# Patient Record
Sex: FEMALE | Race: WHITE | NOT HISPANIC OR LATINO
[De-identification: names, ages, dates, MRNs, and addresses within clinical notes are randomized per-mention and may not be internally consistent; named-entity substitution may affect disease eponyms.]

---

## 2017-03-07 ENCOUNTER — APPOINTMENT (OUTPATIENT)
Dept: INTERNAL MEDICINE | Facility: CLINIC | Age: 82
End: 2017-03-07

## 2017-03-07 VITALS
DIASTOLIC BLOOD PRESSURE: 78 MMHG | HEART RATE: 70 BPM | WEIGHT: 152 LBS | HEIGHT: 64 IN | BODY MASS INDEX: 25.95 KG/M2 | RESPIRATION RATE: 14 BRPM | SYSTOLIC BLOOD PRESSURE: 128 MMHG

## 2017-03-31 LAB
25(OH)D3 SERPL-MCNC: 6.5 NG/ML
ALBUMIN SERPL ELPH-MCNC: 4.1 G/DL
ALP BLD-CCNC: 56 U/L
ALT SERPL-CCNC: 13 U/L
ANION GAP SERPL CALC-SCNC: 13 MMOL/L
AST SERPL-CCNC: 19 U/L
BASOPHILS # BLD AUTO: 0.02 K/UL
BASOPHILS NFR BLD AUTO: 0.5 %
BILIRUB SERPL-MCNC: 0.5 MG/DL
BUN SERPL-MCNC: 16 MG/DL
CALCIUM SERPL-MCNC: 9.2 MG/DL
CHLORIDE SERPL-SCNC: 102 MMOL/L
CHOLEST SERPL-MCNC: 178 MG/DL
CHOLEST/HDLC SERPL: 2.4 RATIO
CO2 SERPL-SCNC: 25 MMOL/L
CREAT SERPL-MCNC: 0.92 MG/DL
CRP SERPL HS-MCNC: 0.4 MG/L
EOSINOPHIL # BLD AUTO: 0.09 K/UL
EOSINOPHIL NFR BLD AUTO: 2.2 %
GLUCOSE BS SERPL-MCNC: 100 MG/DL
GLUCOSE SERPL-MCNC: 104 MG/DL
HBA1C MFR BLD HPLC: 6.1 %
HCT VFR BLD CALC: 47.4 %
HDLC SERPL-MCNC: 75 MG/DL
HGB BLD-MCNC: 15.2 G/DL
IMM GRANULOCYTES NFR BLD AUTO: 0 %
LDLC SERPL CALC-MCNC: 87 MG/DL
LYMPHOCYTES # BLD AUTO: 1.37 K/UL
LYMPHOCYTES NFR BLD AUTO: 32.9 %
MAN DIFF?: NORMAL
MCHC RBC-ENTMCNC: 29.5 PG
MCHC RBC-ENTMCNC: 32.1 GM/DL
MCV RBC AUTO: 91.9 FL
MONOCYTES # BLD AUTO: 0.49 K/UL
MONOCYTES NFR BLD AUTO: 11.8 %
NEUTROPHILS # BLD AUTO: 2.19 K/UL
NEUTROPHILS NFR BLD AUTO: 52.6 %
PLATELET # BLD AUTO: 204 K/UL
POTASSIUM SERPL-SCNC: 4.4 MMOL/L
PROT SERPL-MCNC: 7.5 G/DL
RBC # BLD: 5.16 M/UL
RBC # FLD: 15 %
SODIUM SERPL-SCNC: 140 MMOL/L
T4 FREE SERPL-MCNC: 1.1 NG/DL
TRIGL SERPL-MCNC: 81 MG/DL
TSH SERPL-ACNC: 2.54 UIU/ML
VIT B12 SERPL-MCNC: 419 PG/ML
WBC # FLD AUTO: 4.16 K/UL

## 2017-06-27 ENCOUNTER — APPOINTMENT (OUTPATIENT)
Dept: INTERNAL MEDICINE | Facility: CLINIC | Age: 82
End: 2017-06-27

## 2017-06-27 VITALS
HEIGHT: 64 IN | DIASTOLIC BLOOD PRESSURE: 80 MMHG | WEIGHT: 153 LBS | HEART RATE: 74 BPM | BODY MASS INDEX: 26.12 KG/M2 | SYSTOLIC BLOOD PRESSURE: 130 MMHG | RESPIRATION RATE: 14 BRPM

## 2017-06-27 DIAGNOSIS — Z78.9 OTHER SPECIFIED HEALTH STATUS: ICD-10-CM

## 2017-06-27 RX ORDER — FLUTICASONE PROPIONATE 50 UG/1
50 SPRAY, METERED NASAL DAILY
Qty: 1 | Refills: 3 | Status: DISCONTINUED | COMMUNITY
Start: 2017-03-07 | End: 2017-06-27

## 2017-06-27 RX ORDER — AZITHROMYCIN 250 MG/1
250 TABLET, FILM COATED ORAL
Qty: 6 | Refills: 1 | Status: DISCONTINUED | COMMUNITY
Start: 2017-03-07 | End: 2017-06-27

## 2017-07-27 ENCOUNTER — APPOINTMENT (OUTPATIENT)
Dept: INTERNAL MEDICINE | Facility: CLINIC | Age: 82
End: 2017-07-27
Payer: MEDICARE

## 2017-07-27 VITALS
SYSTOLIC BLOOD PRESSURE: 134 MMHG | HEIGHT: 64 IN | WEIGHT: 154 LBS | RESPIRATION RATE: 14 BRPM | DIASTOLIC BLOOD PRESSURE: 78 MMHG | BODY MASS INDEX: 26.29 KG/M2 | HEART RATE: 68 BPM | TEMPERATURE: 99.5 F

## 2017-07-27 DIAGNOSIS — Z87.09 PERSONAL HISTORY OF OTHER DISEASES OF THE RESPIRATORY SYSTEM: ICD-10-CM

## 2017-07-27 PROCEDURE — 99215 OFFICE O/P EST HI 40 MIN: CPT

## 2017-10-12 ENCOUNTER — APPOINTMENT (OUTPATIENT)
Dept: INTERNAL MEDICINE | Facility: CLINIC | Age: 82
End: 2017-10-12
Payer: MEDICARE

## 2017-10-12 VITALS
DIASTOLIC BLOOD PRESSURE: 80 MMHG | HEART RATE: 72 BPM | WEIGHT: 152 LBS | BODY MASS INDEX: 25.95 KG/M2 | HEIGHT: 64 IN | RESPIRATION RATE: 15 BRPM | TEMPERATURE: 98 F | SYSTOLIC BLOOD PRESSURE: 132 MMHG

## 2017-10-12 PROCEDURE — 99215 OFFICE O/P EST HI 40 MIN: CPT

## 2017-10-12 RX ORDER — FLUTICASONE PROPIONATE 50 UG/1
50 SPRAY, METERED NASAL DAILY
Qty: 1 | Refills: 3 | Status: DISCONTINUED | COMMUNITY
Start: 2017-07-27 | End: 2017-10-12

## 2017-10-12 RX ORDER — AZITHROMYCIN 250 MG/1
250 TABLET, FILM COATED ORAL
Qty: 6 | Refills: 1 | Status: DISCONTINUED | COMMUNITY
Start: 2017-07-27 | End: 2017-10-12

## 2018-01-11 ENCOUNTER — APPOINTMENT (OUTPATIENT)
Dept: INTERNAL MEDICINE | Facility: CLINIC | Age: 83
End: 2018-01-11
Payer: MEDICARE

## 2018-01-11 VITALS
RESPIRATION RATE: 14 BRPM | HEIGHT: 64 IN | WEIGHT: 150 LBS | HEART RATE: 76 BPM | DIASTOLIC BLOOD PRESSURE: 80 MMHG | SYSTOLIC BLOOD PRESSURE: 128 MMHG | BODY MASS INDEX: 25.61 KG/M2

## 2018-01-11 PROCEDURE — 99215 OFFICE O/P EST HI 40 MIN: CPT

## 2018-05-10 ENCOUNTER — APPOINTMENT (OUTPATIENT)
Dept: INTERNAL MEDICINE | Facility: CLINIC | Age: 83
End: 2018-05-10
Payer: MEDICARE

## 2018-05-10 ENCOUNTER — OTHER (OUTPATIENT)
Age: 83
End: 2018-05-10

## 2018-05-10 VITALS
DIASTOLIC BLOOD PRESSURE: 82 MMHG | BODY MASS INDEX: 25.44 KG/M2 | RESPIRATION RATE: 15 BRPM | HEIGHT: 64 IN | WEIGHT: 149 LBS | SYSTOLIC BLOOD PRESSURE: 126 MMHG | HEART RATE: 76 BPM

## 2018-05-10 PROCEDURE — 99215 OFFICE O/P EST HI 40 MIN: CPT

## 2018-05-11 ENCOUNTER — LABORATORY RESULT (OUTPATIENT)
Age: 83
End: 2018-05-11

## 2018-05-21 LAB
25(OH)D3 SERPL-MCNC: 10.3 NG/ML
ALBUMIN SERPL ELPH-MCNC: 4.2 G/DL
ALP BLD-CCNC: 43 U/L
ALT SERPL-CCNC: 18 U/L
ANION GAP SERPL CALC-SCNC: 14 MMOL/L
APPEARANCE: CLEAR
AST SERPL-CCNC: 21 U/L
BASOPHILS # BLD AUTO: 0.02 K/UL
BASOPHILS NFR BLD AUTO: 0.4 %
BILIRUB SERPL-MCNC: 0.6 MG/DL
BILIRUBIN URINE: NEGATIVE
BLOOD URINE: NEGATIVE
BUN SERPL-MCNC: 19 MG/DL
CALCIUM SERPL-MCNC: 9.3 MG/DL
CHLORIDE SERPL-SCNC: 102 MMOL/L
CHOLEST SERPL-MCNC: 194 MG/DL
CHOLEST/HDLC SERPL: 2.4 RATIO
CO2 SERPL-SCNC: 27 MMOL/L
COLOR: YELLOW
CREAT SERPL-MCNC: 0.95 MG/DL
CRP SERPL HS-MCNC: <0.2 MG/L
EOSINOPHIL # BLD AUTO: 0.08 K/UL
EOSINOPHIL NFR BLD AUTO: 1.7 %
GLUCOSE BS SERPL-MCNC: 92 MG/DL
GLUCOSE QUALITATIVE U: NEGATIVE MG/DL
GLUCOSE SERPL-MCNC: 99 MG/DL
HBA1C MFR BLD HPLC: 6.2 %
HCT VFR BLD CALC: 45.7 %
HDLC SERPL-MCNC: 82 MG/DL
HGB BLD-MCNC: 14.7 G/DL
IMM GRANULOCYTES NFR BLD AUTO: 0.2 %
KETONES URINE: NEGATIVE
LDLC SERPL CALC-MCNC: 97 MG/DL
LEUKOCYTE ESTERASE URINE: NEGATIVE
LYMPHOCYTES # BLD AUTO: 1.67 K/UL
LYMPHOCYTES NFR BLD AUTO: 35 %
MAN DIFF?: NORMAL
MCHC RBC-ENTMCNC: 30.4 PG
MCHC RBC-ENTMCNC: 32.2 GM/DL
MCV RBC AUTO: 94.4 FL
MONOCYTES # BLD AUTO: 0.64 K/UL
MONOCYTES NFR BLD AUTO: 13.4 %
NEUTROPHILS # BLD AUTO: 2.35 K/UL
NEUTROPHILS NFR BLD AUTO: 49.3 %
NITRITE URINE: NEGATIVE
PH URINE: 5.5
PLATELET # BLD AUTO: 185 K/UL
POTASSIUM SERPL-SCNC: 3.5 MMOL/L
PROT SERPL-MCNC: 7.6 G/DL
PROTEIN URINE: ABNORMAL MG/DL
RBC # BLD: 4.84 M/UL
RBC # FLD: 14.8 %
SODIUM SERPL-SCNC: 143 MMOL/L
SPECIFIC GRAVITY URINE: 1.02
T4 FREE SERPL-MCNC: 1.3 NG/DL
TRIGL SERPL-MCNC: 73 MG/DL
TSH SERPL-ACNC: 2.92 UIU/ML
UROBILINOGEN URINE: NEGATIVE MG/DL
VIT B12 SERPL-MCNC: 434 PG/ML
WBC # FLD AUTO: 4.77 K/UL

## 2018-11-09 ENCOUNTER — APPOINTMENT (OUTPATIENT)
Dept: INTERNAL MEDICINE | Facility: CLINIC | Age: 83
End: 2018-11-09
Payer: MEDICARE

## 2018-11-09 VITALS
SYSTOLIC BLOOD PRESSURE: 128 MMHG | RESPIRATION RATE: 16 BRPM | DIASTOLIC BLOOD PRESSURE: 80 MMHG | HEART RATE: 78 BPM | HEIGHT: 64 IN | BODY MASS INDEX: 27.12 KG/M2

## 2018-11-09 VITALS — BODY MASS INDEX: 26.98 KG/M2 | WEIGHT: 158 LBS | HEIGHT: 64 IN

## 2018-11-09 PROCEDURE — 99214 OFFICE O/P EST MOD 30 MIN: CPT

## 2018-11-09 NOTE — ASSESSMENT
[FreeTextEntry1] : Physical exam shows a well-developed female in no acute distress blood pressure 120/80 weight 158 pounds heart rate is 78 respirations of 16.. HEENT was unremarkable chest was clear to auscultation and percussion cardiovascular exam shows a normal S1 and S2 with no extra heart sounds or murmurs abdomen was soft and nontender extremities showed no clubbing cyanosis or edema neurologic exam was nonfocal patient refuses all immunizations of any type some time was spent advising her to significance of getting them and she is adamant against. She was given the name of an audiologist to get a hearing test as her hearing may be slightly diminished blood tests were performed in May of this year to date with her ophthalmologist and mentions that she has the start of cataracts

## 2018-11-09 NOTE — HISTORY OF PRESENT ILLNESS
[FreeTextEntry1] : Comes to the office for followup to review her medications and discuss her overall health main issues are over the past several years her energy has been decreasing [de-identified] : 84-year-old woman comes to the office for followup to review her medication and discuss her overall health patient's main complaint is faigued at times he denies chest pain shortness of breath exertional dyspnea lightheadedness palpitations dizziness or syncope he's had no temperature chills sweats myalgias headache sinus congestion sore throat cough wheezing abdominal pain dysuria nausea vomiting diarrhea constipation bright red blood per rectum or black stools he denies significant musculoskeletal pains he has mild lower back pain gets up several times at night to urinate and is not worsened over the years her appetite has been good size and her weight is up several pounds

## 2018-11-09 NOTE — HEALTH RISK ASSESSMENT
[No falls in past year] : Patient reported no falls in the past year [0] : 2) Feeling down, depressed, or hopeless: Not at all (0) [[id number]] : 0

## 2018-11-09 NOTE — REVIEW OF SYSTEMS
[Nocturia] : nocturia [Joint Stiffness] : joint stiffness [Negative] : Heme/Lymph [Fever] : no fever [Chills] : no chills [Fatigue] : no fatigue [Hot Flashes] : no hot flashes [Night Sweats] : no night sweats [Recent Change In Weight] : ~T recent weight change [Discharge] : no discharge [Pain] : no pain [Redness] : no redness [Dryness] : no dryness  [Vision Problems] : no vision problems [Itching] : no itching [Earache] : no earache [Hearing Loss] : no hearing loss [Nosebleed] : no nosebleeds [Hoarseness] : no hoarseness [Nasal Discharge] : no nasal discharge [Sore Throat] : no sore throat [Postnasal Drip] : postnasal drip [Chest Pain] : no chest pain [Palpitations] : no palpitations [Leg Claudication] : no leg claudication [Lower Ext Edema] : no lower extremity edema [Orthopnea] : no orthopnea [Paroysmal Nocturnal Dyspnea] : no paroysmal nocturnal dyspnea [Shortness Of Breath] : no shortness of breath [Wheezing] : no wheezing [Cough] : no cough [Dyspnea on Exertion] : no dyspnea on exertion [Abdominal Pain] : no abdominal pain [Nausea] : no nausea [Constipation] : no constipation [Diarrhea] : diarrhea [Vomiting] : no vomiting [Heartburn] : no heartburn [Melena] : no melena [Dysuria] : no dysuria [Incontinence] : no incontinence [Poor Libido] : libido not poor [Hematuria] : no hematuria [Frequency] : no frequency [Vaginal Discharge] : no vaginal discharge [Dysmenorrhea] : no dysmenorrhea [Joint Pain] : no joint pain [Joint Swelling] : no joint swelling [Muscle Weakness] : no muscle weakness [Muscle Pain] : no muscle pain [Back Pain] : no back pain [Itching] : no itching [Mole Changes] : no mole changes [Nail Changes] : no nail changes [Hair Changes] : no hair changes [Skin Rash] : no skin rash [Headache] : no headache [Dizziness] : no dizziness [Fainting] : no fainting [Confusion] : no confusion [Memory Loss] : no memory loss [Unsteady Walking] : no ataxia [Suicidal] : not suicidal [Insomnia] : no insomnia [Anxiety] : no anxiety [Depression] : no depression [Easy Bleeding] : no easy bleeding [Easy Bruising] : no easy bruising [Swollen Glands] : no swollen glands [FreeTextEntry2] : up 2-3 lbs

## 2019-03-22 ENCOUNTER — APPOINTMENT (OUTPATIENT)
Dept: INTERNAL MEDICINE | Facility: CLINIC | Age: 84
End: 2019-03-22
Payer: MEDICARE

## 2019-03-22 VITALS
HEART RATE: 81 BPM | RESPIRATION RATE: 14 BRPM | OXYGEN SATURATION: 97 % | SYSTOLIC BLOOD PRESSURE: 138 MMHG | DIASTOLIC BLOOD PRESSURE: 81 MMHG | TEMPERATURE: 97.5 F

## 2019-03-22 VITALS — WEIGHT: 162 LBS | BODY MASS INDEX: 27.66 KG/M2 | HEIGHT: 64 IN

## 2019-03-22 DIAGNOSIS — R19.5 OTHER FECAL ABNORMALITIES: ICD-10-CM

## 2019-03-22 PROCEDURE — 99215 OFFICE O/P EST HI 40 MIN: CPT

## 2019-03-23 PROBLEM — R19.5 POSITIVE COLORECTAL CANCER SCREENING USING DNA-BASED STOOL TEST: Status: ACTIVE | Noted: 2017-10-12

## 2019-03-23 NOTE — REVIEW OF SYSTEMS
[Postnasal Drip] : postnasal drip [Nocturia] : nocturia [Joint Stiffness] : joint stiffness [Negative] : Heme/Lymph [Fever] : no fever [Chills] : no chills [Fatigue] : no fatigue [Hot Flashes] : no hot flashes [Night Sweats] : no night sweats [Recent Change In Weight] : ~T no recent weight change [Discharge] : no discharge [Pain] : no pain [Redness] : no redness [Dryness] : no dryness  [Vision Problems] : no vision problems [Itching] : no itching [Earache] : no earache [Hearing Loss] : no hearing loss [Nosebleed] : no nosebleeds [Hoarseness] : no hoarseness [Nasal Discharge] : no nasal discharge [Sore Throat] : no sore throat [Chest Pain] : no chest pain [Palpitations] : no palpitations [Leg Claudication] : no leg claudication [Lower Ext Edema] : no lower extremity edema [Orthopnea] : no orthopnea [Paroysmal Nocturnal Dyspnea] : no paroysmal nocturnal dyspnea [Shortness Of Breath] : no shortness of breath [Wheezing] : no wheezing [Cough] : no cough [Dyspnea on Exertion] : no dyspnea on exertion [Abdominal Pain] : no abdominal pain [Nausea] : no nausea [Constipation] : no constipation [Diarrhea] : diarrhea [Vomiting] : no vomiting [Heartburn] : no heartburn [Melena] : no melena [Dysuria] : no dysuria [Incontinence] : no incontinence [Poor Libido] : libido not poor [Hematuria] : no hematuria [Frequency] : no frequency [Vaginal Discharge] : no vaginal discharge [Dysmenorrhea] : no dysmenorrhea [Joint Pain] : no joint pain [Joint Swelling] : no joint swelling [Muscle Weakness] : no muscle weakness [Muscle Pain] : no muscle pain [Back Pain] : no back pain [Itching] : no itching [Mole Changes] : no mole changes [Nail Changes] : no nail changes [Hair Changes] : no hair changes [Skin Rash] : no skin rash [Headache] : no headache [Dizziness] : no dizziness [Fainting] : no fainting [Confusion] : no confusion [Memory Loss] : no memory loss [Unsteady Walking] : no ataxia [Suicidal] : not suicidal [Insomnia] : no insomnia [Anxiety] : anxiety [Depression] : no depression [Easy Bleeding] : no easy bleeding [Easy Bruising] : no easy bruising [Swollen Glands] : no swollen glands

## 2019-03-23 NOTE — HEALTH RISK ASSESSMENT
[No falls in past year] : Patient reported no falls in the past year [0] : 2) Feeling down, depressed, or hopeless: Not at all (0) [[id number]] : 0

## 2019-03-23 NOTE — HISTORY OF PRESENT ILLNESS
[FreeTextEntry1] : 84-year-old woman comes to the office for followup to review her medicines discuss her overall health she also needs a form filled out to continue to receive benefits from her '[employer] [de-identified] : 84-year-old woman with a history of hypertension lower back pain and a positive stool based DNA test comes to the office for followup patient denies temperature chills sweats or myalgias she's had no headache sinus congestion sore throat cough wheezing pleurisy  chest pain shortness of breath exertional dyspnea lightheadedness palpitations dizziness vertigo or syncope she denies abdominal pain nausea vomiting diarrhea change in caliber of her stools bright red blood per rectum or black stools her appetite has been good her weight has been stable her for occasional lower back pain she denies any significant musculoskeletal discomfort she does get up occasionally at night to urinate but denies dysuria

## 2019-04-03 LAB
25(OH)D3 SERPL-MCNC: 8.4 NG/ML
ALBUMIN SERPL ELPH-MCNC: 4.6 G/DL
ALP BLD-CCNC: 57 U/L
ALT SERPL-CCNC: 14 U/L
ANION GAP SERPL CALC-SCNC: 12 MMOL/L
AST SERPL-CCNC: 17 U/L
BASOPHILS # BLD AUTO: 0.02 K/UL
BASOPHILS NFR BLD AUTO: 0.4 %
BILIRUB SERPL-MCNC: 0.4 MG/DL
BUN SERPL-MCNC: 22 MG/DL
CALCIUM SERPL-MCNC: 9.5 MG/DL
CHLORIDE SERPL-SCNC: 103 MMOL/L
CHOLEST SERPL-MCNC: 207 MG/DL
CHOLEST/HDLC SERPL: 2.9 RATIO
CO2 SERPL-SCNC: 28 MMOL/L
CREAT SERPL-MCNC: 0.95 MG/DL
CRP SERPL HS-MCNC: 2.58 MG/L
EOSINOPHIL # BLD AUTO: 0.08 K/UL
EOSINOPHIL NFR BLD AUTO: 1.6 %
ESTIMATED AVERAGE GLUCOSE: 134 MG/DL
GLUCOSE BS SERPL-MCNC: 108 MG/DL
GLUCOSE SERPL-MCNC: 115 MG/DL
HBA1C MFR BLD HPLC: 6.3 %
HCT VFR BLD CALC: 49.6 %
HDLC SERPL-MCNC: 72 MG/DL
HGB BLD-MCNC: 16.1 G/DL
IMM GRANULOCYTES NFR BLD AUTO: 0.2 %
LDLC SERPL CALC-MCNC: 111 MG/DL
LYMPHOCYTES # BLD AUTO: 1.5 K/UL
LYMPHOCYTES NFR BLD AUTO: 30.6 %
MAN DIFF?: NORMAL
MCHC RBC-ENTMCNC: 30.1 PG
MCHC RBC-ENTMCNC: 32.5 GM/DL
MCV RBC AUTO: 92.7 FL
MONOCYTES # BLD AUTO: 0.59 K/UL
MONOCYTES NFR BLD AUTO: 12 %
NEUTROPHILS # BLD AUTO: 2.7 K/UL
NEUTROPHILS NFR BLD AUTO: 55.2 %
PLATELET # BLD AUTO: 208 K/UL
POTASSIUM SERPL-SCNC: 4.2 MMOL/L
PROT SERPL-MCNC: 7.5 G/DL
RBC # BLD: 5.35 M/UL
RBC # FLD: 14 %
SODIUM SERPL-SCNC: 143 MMOL/L
T4 FREE SERPL-MCNC: 1.1 NG/DL
TRIGL SERPL-MCNC: 119 MG/DL
TSH SERPL-ACNC: 2.29 UIU/ML
VIT B12 SERPL-MCNC: 403 PG/ML
WBC # FLD AUTO: 4.9 K/UL

## 2019-05-15 NOTE — ASSESSMENT
[FreeTextEntry1] : Physical exam shows an elderly woman in no acute distress blood pressure 138/81 height 5 foot 4 weight 162 pounds respirations 14 pulse of 81 HEENT was unremarkable chest was clear cardiovascular exam is regular with a 1/6 systolic murmur there were no carotid bruits abdomen was soft extremities showed no clubbing cyanosis or edema the neurologic exam was nonfocal long discussion with the patient concerning her positive stool based DNA test/COLOGUARD. She defers on any colon cancer screening such as colonoscopy  virtual colonoscopy or barium enema . Form was filled out as per her request for her 's [employer] benefits she wishes to perform yearly blood tests and a slip was given for CBC comprehensive metabolic profile lipid profile thyroid profile hemoglobin A1c urinalysis CRP and vitamins D3 and B12 she is up-to-date with her ophthalmologist and was given the name of dermatologist to consider Deep Sutures: 4-0 Monocryl

## 2019-06-21 ENCOUNTER — APPOINTMENT (OUTPATIENT)
Dept: INTERNAL MEDICINE | Facility: CLINIC | Age: 84
End: 2019-06-21

## 2019-09-04 ENCOUNTER — APPOINTMENT (OUTPATIENT)
Dept: INTERNAL MEDICINE | Facility: CLINIC | Age: 84
End: 2019-09-04
Payer: MEDICARE

## 2019-09-04 VITALS
HEART RATE: 62 BPM | HEIGHT: 64 IN | SYSTOLIC BLOOD PRESSURE: 140 MMHG | OXYGEN SATURATION: 95 % | TEMPERATURE: 98.6 F | RESPIRATION RATE: 14 BRPM | WEIGHT: 159 LBS | BODY MASS INDEX: 27.14 KG/M2 | DIASTOLIC BLOOD PRESSURE: 88 MMHG

## 2019-09-04 DIAGNOSIS — A08.4 VIRAL INTESTINAL INFECTION, UNSPECIFIED: ICD-10-CM

## 2019-09-04 PROCEDURE — 99215 OFFICE O/P EST HI 40 MIN: CPT

## 2019-09-04 NOTE — PHYSICAL EXAM
[No Acute Distress] : no acute distress [Well Nourished] : well nourished [Well Developed] : well developed [Well-Appearing] : well-appearing [Normal Voice/Communication] : normal voice/communication [Normal Sclera/Conjunctiva] : normal sclera/conjunctiva [PERRL] : pupils equal round and reactive to light [EOMI] : extraocular movements intact [Normal Oropharynx] : the oropharynx was normal [Normal Outer Ear/Nose] : the outer ears and nose were normal in appearance [Normal TMs] : both tympanic membranes were normal [Normal Nasal Mucosa] : the nasal mucosa was normal [No JVD] : no jugular venous distention [Supple] : supple [No Lymphadenopathy] : no lymphadenopathy [No Respiratory Distress] : no respiratory distress  [Thyroid Normal, No Nodules] : the thyroid was normal and there were no nodules present [No Accessory Muscle Use] : no accessory muscle use [Clear to Auscultation] : lungs were clear to auscultation bilaterally [Normal Rate] : normal rate  [Normal S1, S2] : normal S1 and S2 [Regular Rhythm] : with a regular rhythm [No Murmur] : no murmur heard [No Carotid Bruits] : no carotid bruits [No Abdominal Bruit] : a ~M bruit was not heard ~T in the abdomen [No Varicosities] : no varicosities [Pedal Pulses Present] : the pedal pulses are present [No Edema] : there was no peripheral edema [No Extremity Clubbing/Cyanosis] : no extremity clubbing/cyanosis [No Palpable Aorta] : no palpable aorta [Declined Breast Exam] : declined breast exam  [Soft] : abdomen soft [Non Tender] : non-tender [Non-distended] : non-distended [No HSM] : no HSM [No Masses] : no abdominal mass palpated [Normal Bowel Sounds] : normal bowel sounds [No Hernias] : no hernias [Normal Supraclavicular Nodes] : no supraclavicular lymphadenopathy [Declined Rectal Exam] : declined rectal exam [Normal Axillary Nodes] : no axillary lymphadenopathy [Normal Posterior Cervical Nodes] : no posterior cervical lymphadenopathy [Normal Inguinal Nodes] : no inguinal lymphadenopathy [Normal Anterior Cervical Nodes] : no anterior cervical lymphadenopathy [Normal Femoral Nodes] : no femoral lymphadenopathy [No CVA Tenderness] : no CVA  tenderness [No Spinal Tenderness] : no spinal tenderness [No Joint Swelling] : no joint swelling [Grossly Normal Strength/Tone] : grossly normal strength/tone [No Rash] : no rash [Coordination Grossly Intact] : coordination grossly intact [Normal Gait] : normal gait [No Focal Deficits] : no focal deficits [Deep Tendon Reflexes (DTR)] : deep tendon reflexes were 2+ and symmetric [Normal Affect] : the affect was normal [Normal Insight/Judgement] : insight and judgment were intact

## 2019-09-05 NOTE — HISTORY OF PRESENT ILLNESS
[FreeTextEntry1] : Comes to the office for followup to review her medications and discuss her overall health recent episode of nausea vomiting and diarrhea with subsequent fatigue [de-identified] : 85-year-old woman comes to the office for followup with a history of hypertension and chronic lower back pain recently underwent a brief episode of nausea and vomiting and diarrhea subsequent to it over the last week she has felt fatigued she currently denies temperature chills sweats or myalgias she's had no headache sinus congestion sore throat cough wheezing pleurisy chest pain shortness of breath exertional dyspnea lightheadedness palpitations dizziness vertigo or syncope he denies currently abdominal pain nausea vomiting diarrhea or constipation bright red blood per rectum or black stools her appetite has been good her weight is stable she denies dysuria or gross hematuria she does get up occasionally at night to urinate besides her lower back she denies any significant musculoskeletal complaints she has had no recent skin rashes\par \par

## 2019-09-05 NOTE — REVIEW OF SYSTEMS
[Nocturia] : nocturia [Joint Stiffness] : joint stiffness [Anxiety] : anxiety [Negative] : Heme/Lymph [Fever] : no fever [Chills] : no chills [Fatigue] : fatigue [Hot Flashes] : no hot flashes [Night Sweats] : no night sweats [Recent Change In Weight] : ~T no recent weight change [Discharge] : no discharge [Pain] : no pain [Redness] : no redness [Dryness] : no dryness  [Vision Problems] : no vision problems [Itching] : no itching [Earache] : no earache [Hearing Loss] : no hearing loss [Nosebleed] : no nosebleeds [Hoarseness] : no hoarseness [Sore Throat] : no sore throat [Nasal Discharge] : no nasal discharge [Postnasal Drip] : no postnasal drip [Chest Pain] : no chest pain [Palpitations] : no palpitations [Leg Claudication] : no leg claudication [Orthopnea] : no orthopnea [Lower Ext Edema] : no lower extremity edema [Paroysmal Nocturnal Dyspnea] : no paroysmal nocturnal dyspnea [Shortness Of Breath] : no shortness of breath [Wheezing] : no wheezing [Dyspnea on Exertion] : no dyspnea on exertion [Cough] : no cough [Abdominal Pain] : no abdominal pain [Constipation] : no constipation [Nausea] : no nausea [Diarrhea] : diarrhea [Vomiting] : vomiting [Heartburn] : no heartburn [Melena] : no melena [Dysuria] : no dysuria [Poor Libido] : libido not poor [Incontinence] : no incontinence [Hematuria] : no hematuria [Frequency] : no frequency [Dysmenorrhea] : no dysmenorrhea [Vaginal Discharge] : no vaginal discharge [Joint Pain] : no joint pain [Muscle Weakness] : no muscle weakness [Joint Swelling] : no joint swelling [Muscle Pain] : no muscle pain [Back Pain] : no back pain [Itching] : no itching [Mole Changes] : no mole changes [Nail Changes] : no nail changes [Hair Changes] : no hair changes [Skin Rash] : no skin rash [Headache] : no headache [Dizziness] : no dizziness [Fainting] : no fainting [Confusion] : no confusion [Memory Loss] : no memory loss [Unsteady Walking] : no ataxia [Suicidal] : not suicidal [Insomnia] : no insomnia [Depression] : no depression [Easy Bleeding] : no easy bleeding [Easy Bruising] : no easy bruising [Swollen Glands] : no swollen glands [FreeTextEntry7] : resolved

## 2019-09-05 NOTE — ASSESSMENT
[FreeTextEntry1] : Physical exam shows a well-developed female in no acute distress blood pressure 140/88 pulse is 62 respirations of 15 HEENT was unremarkable mucous membranes were moist with no skin tenting chest was clear cardiovascular was regular abdomen was soft and nontender with no hepatosplenomegaly extremities showed no edema neurologic exam was nonfocal patient with recent gastroenteritis which has resolved leaving her with some fatigue have ordered complete blood test including CBC full chemistries thyroid profile lipid profile hemoglobin A1c CRP urinalysis vitamins D3 and B12 and measles mumps and rubella antibodies. She was advised to hydrate she is up-to-date with her ophthalmologist and dermatologist currently defers on colorectal cancer screening and mammography at her age.She was advised to consider the Prevnar 13 the flu shot and the new shingle shot.Blood pressure slightly elevated at this time she wishes to monitor it and we'll attempt to reduce salt and some weight

## 2019-09-05 NOTE — HEALTH RISK ASSESSMENT
[Yes] : Yes [No falls in past year] : Patient reported no falls in the past year [0] : 2) Feeling down, depressed, or hopeless: Not at all (0) [] : No [[id number]] : 0

## 2019-09-18 LAB
25(OH)D3 SERPL-MCNC: 6.3 NG/ML
ALBUMIN SERPL ELPH-MCNC: 4.5 G/DL
ALP BLD-CCNC: 48 U/L
ALT SERPL-CCNC: 12 U/L
ANION GAP SERPL CALC-SCNC: 15 MMOL/L
APPEARANCE: CLEAR
AST SERPL-CCNC: 16 U/L
BASOPHILS # BLD AUTO: 0.04 K/UL
BASOPHILS NFR BLD AUTO: 0.8 %
BILIRUB SERPL-MCNC: 0.4 MG/DL
BILIRUBIN URINE: NEGATIVE
BLOOD URINE: NEGATIVE
BUN SERPL-MCNC: 22 MG/DL
CALCIUM SERPL-MCNC: 9.8 MG/DL
CHLORIDE SERPL-SCNC: 99 MMOL/L
CHOLEST SERPL-MCNC: 207 MG/DL
CHOLEST/HDLC SERPL: 3.2 RATIO
CO2 SERPL-SCNC: 26 MMOL/L
COLOR: NORMAL
CREAT SERPL-MCNC: 1.16 MG/DL
CRP SERPL HS-MCNC: 0.59 MG/L
EOSINOPHIL # BLD AUTO: 0.05 K/UL
EOSINOPHIL NFR BLD AUTO: 1 %
ESTIMATED AVERAGE GLUCOSE: 131 MG/DL
GLUCOSE BS SERPL-MCNC: 108 MG/DL
GLUCOSE QUALITATIVE U: NEGATIVE
GLUCOSE SERPL-MCNC: 115 MG/DL
HBA1C MFR BLD HPLC: 6.2 %
HCT VFR BLD CALC: 49.2 %
HDLC SERPL-MCNC: 65 MG/DL
HGB BLD-MCNC: 15.9 G/DL
IMM GRANULOCYTES NFR BLD AUTO: 0.2 %
KETONES URINE: NEGATIVE
LDLC SERPL CALC-MCNC: 116 MG/DL
LEUKOCYTE ESTERASE URINE: NEGATIVE
LYMPHOCYTES # BLD AUTO: 1.69 K/UL
LYMPHOCYTES NFR BLD AUTO: 34.3 %
MAN DIFF?: NORMAL
MCHC RBC-ENTMCNC: 30.4 PG
MCHC RBC-ENTMCNC: 32.3 GM/DL
MCV RBC AUTO: 94.1 FL
MEV IGG FLD QL IA: >300 AU/ML
MEV IGG+IGM SER-IMP: POSITIVE
MONOCYTES # BLD AUTO: 0.5 K/UL
MONOCYTES NFR BLD AUTO: 10.2 %
MUV AB SER-ACNC: POSITIVE
MUV IGG SER QL IA: >300 AU/ML
NEUTROPHILS # BLD AUTO: 2.63 K/UL
NEUTROPHILS NFR BLD AUTO: 53.5 %
NITRITE URINE: NEGATIVE
PH URINE: 7
PLATELET # BLD AUTO: 204 K/UL
POTASSIUM SERPL-SCNC: 4.2 MMOL/L
PROT SERPL-MCNC: 7.7 G/DL
PROTEIN URINE: NEGATIVE
RBC # BLD: 5.23 M/UL
RBC # FLD: 14.6 %
RUBV IGG FLD-ACNC: 30.7 INDEX
RUBV IGG SER-IMP: POSITIVE
SODIUM SERPL-SCNC: 140 MMOL/L
SPECIFIC GRAVITY URINE: 1.02
T4 FREE SERPL-MCNC: 1.2 NG/DL
TRIGL SERPL-MCNC: 130 MG/DL
TSH SERPL-ACNC: 3.47 UIU/ML
UROBILINOGEN URINE: NORMAL
VIT B12 SERPL-MCNC: 413 PG/ML
WBC # FLD AUTO: 4.92 K/UL

## 2019-11-30 ENCOUNTER — RX RENEWAL (OUTPATIENT)
Age: 84
End: 2019-11-30

## 2020-02-28 ENCOUNTER — RX RENEWAL (OUTPATIENT)
Age: 85
End: 2020-02-28

## 2020-03-02 RX ORDER — LOSARTAN POTASSIUM AND HYDROCHLOROTHIAZIDE 12.5; 1 MG/1; MG/1
100-12.5 TABLET ORAL
Qty: 90 | Refills: 0 | Status: DISCONTINUED | COMMUNITY
Start: 2017-10-12 | End: 2020-03-02

## 2020-05-28 ENCOUNTER — RX RENEWAL (OUTPATIENT)
Age: 85
End: 2020-05-28

## 2020-08-19 ENCOUNTER — APPOINTMENT (OUTPATIENT)
Dept: INTERNAL MEDICINE | Facility: CLINIC | Age: 85
End: 2020-08-19
Payer: MEDICARE

## 2020-08-19 PROCEDURE — 99443: CPT | Mod: 95

## 2020-09-22 LAB
25(OH)D3 SERPL-MCNC: 8.3 NG/ML
ALBUMIN SERPL ELPH-MCNC: 4.3 G/DL
ALP BLD-CCNC: 59 U/L
ALT SERPL-CCNC: 12 U/L
ANION GAP SERPL CALC-SCNC: 17 MMOL/L
AST SERPL-CCNC: 14 U/L
BASOPHILS # BLD AUTO: 0.05 K/UL
BASOPHILS NFR BLD AUTO: 0.9 %
BILIRUB SERPL-MCNC: 0.3 MG/DL
BUN SERPL-MCNC: 26 MG/DL
CALCIUM SERPL-MCNC: 9.6 MG/DL
CHLORIDE SERPL-SCNC: 102 MMOL/L
CHOLEST SERPL-MCNC: 175 MG/DL
CHOLEST/HDLC SERPL: 3.1 RATIO
CO2 SERPL-SCNC: 25 MMOL/L
CREAT SERPL-MCNC: 1 MG/DL
CRP SERPL HS-MCNC: 1.57 MG/L
EOSINOPHIL # BLD AUTO: 0.06 K/UL
EOSINOPHIL NFR BLD AUTO: 1.1 %
ESTIMATED AVERAGE GLUCOSE: 137 MG/DL
GLUCOSE BS SERPL-MCNC: 99 MG/DL
GLUCOSE SERPL-MCNC: 69 MG/DL
HBA1C MFR BLD HPLC: 6.4 %
HCT VFR BLD CALC: 50 %
HDLC SERPL-MCNC: 57 MG/DL
HGB BLD-MCNC: 15.8 G/DL
IMM GRANULOCYTES NFR BLD AUTO: 0.2 %
LDLC SERPL CALC-MCNC: 90 MG/DL
LYMPHOCYTES # BLD AUTO: 1.47 K/UL
LYMPHOCYTES NFR BLD AUTO: 27 %
MAN DIFF?: NORMAL
MCHC RBC-ENTMCNC: 30.7 PG
MCHC RBC-ENTMCNC: 31.6 GM/DL
MCV RBC AUTO: 97.1 FL
MEV IGG FLD QL IA: >300 AU/ML
MEV IGG+IGM SER-IMP: POSITIVE
MONOCYTES # BLD AUTO: 0.57 K/UL
MONOCYTES NFR BLD AUTO: 10.5 %
MUV AB SER-ACNC: POSITIVE
MUV IGG SER QL IA: >300 AU/ML
NEUTROPHILS # BLD AUTO: 3.29 K/UL
NEUTROPHILS NFR BLD AUTO: 60.3 %
PLATELET # BLD AUTO: 155 K/UL
POTASSIUM SERPL-SCNC: 3.9 MMOL/L
PROT SERPL-MCNC: 7.1 G/DL
RBC # BLD: 5.15 M/UL
RBC # FLD: 14.7 %
RUBV IGG FLD-ACNC: >33 INDEX
RUBV IGG SER-IMP: POSITIVE
SARS-COV-2 IGG SERPL IA-ACNC: 0.01 INDEX
SARS-COV-2 IGG SERPL QL IA: NEGATIVE
SODIUM SERPL-SCNC: 144 MMOL/L
T4 FREE SERPL-MCNC: 1.2 NG/DL
TRIGL SERPL-MCNC: 143 MG/DL
TSH SERPL-ACNC: 2.17 UIU/ML
VIT B12 SERPL-MCNC: 524 PG/ML
WBC # FLD AUTO: 5.45 K/UL

## 2020-12-15 PROBLEM — Z87.09 HISTORY OF UPPER RESPIRATORY INFECTION: Status: RESOLVED | Noted: 2017-03-07 | Resolved: 2020-12-15

## 2021-05-15 ENCOUNTER — RX RENEWAL (OUTPATIENT)
Age: 86
End: 2021-05-15

## 2021-08-15 ENCOUNTER — RX RENEWAL (OUTPATIENT)
Age: 86
End: 2021-08-15

## 2021-08-19 ENCOUNTER — APPOINTMENT (OUTPATIENT)
Dept: INTERNAL MEDICINE | Facility: CLINIC | Age: 86
End: 2021-08-19
Payer: MEDICARE

## 2021-08-19 VITALS
RESPIRATION RATE: 16 BRPM | TEMPERATURE: 97.9 F | SYSTOLIC BLOOD PRESSURE: 120 MMHG | DIASTOLIC BLOOD PRESSURE: 82 MMHG | HEIGHT: 64 IN | HEART RATE: 105 BPM | OXYGEN SATURATION: 95 %

## 2021-08-19 DIAGNOSIS — Z00.00 ENCOUNTER FOR GENERAL ADULT MEDICAL EXAMINATION W/OUT ABNORMAL FINDINGS: ICD-10-CM

## 2021-08-19 PROCEDURE — G0438: CPT

## 2021-08-19 NOTE — PHYSICAL EXAM
[No Acute Distress] : no acute distress [Well Nourished] : well nourished [Well Developed] : well developed [Well-Appearing] : well-appearing [Normal Voice/Communication] : normal voice/communication [Normal Sclera/Conjunctiva] : normal sclera/conjunctiva [PERRL] : pupils equal round and reactive to light [EOMI] : extraocular movements intact [Normal Outer Ear/Nose] : the outer ears and nose were normal in appearance [Normal Oropharynx] : the oropharynx was normal [Normal TMs] : both tympanic membranes were normal [Normal Nasal Mucosa] : the nasal mucosa was normal [No JVD] : no jugular venous distention [No Lymphadenopathy] : no lymphadenopathy [Supple] : supple [Thyroid Normal, No Nodules] : the thyroid was normal and there were no nodules present [No Respiratory Distress] : no respiratory distress  [No Accessory Muscle Use] : no accessory muscle use [Clear to Auscultation] : lungs were clear to auscultation bilaterally [Normal Rate] : normal rate  [Regular Rhythm] : with a regular rhythm [Normal S1, S2] : normal S1 and S2 [No Murmur] : no murmur heard [No Carotid Bruits] : no carotid bruits [No Abdominal Bruit] : a ~M bruit was not heard ~T in the abdomen [No Varicosities] : no varicosities [Pedal Pulses Present] : the pedal pulses are present [No Edema] : there was no peripheral edema [No Palpable Aorta] : no palpable aorta [No Extremity Clubbing/Cyanosis] : no extremity clubbing/cyanosis [Declined Breast Exam] : declined breast exam  [Soft] : abdomen soft [Non Tender] : non-tender [Non-distended] : non-distended [No Masses] : no abdominal mass palpated [No HSM] : no HSM [Normal Bowel Sounds] : normal bowel sounds [No Hernias] : no hernias [Declined Rectal Exam] : declined rectal exam [Normal Supraclavicular Nodes] : no supraclavicular lymphadenopathy [Normal Axillary Nodes] : no axillary lymphadenopathy [Normal Posterior Cervical Nodes] : no posterior cervical lymphadenopathy [Normal Anterior Cervical Nodes] : no anterior cervical lymphadenopathy [Normal Inguinal Nodes] : no inguinal lymphadenopathy [Normal Femoral Nodes] : no femoral lymphadenopathy [No CVA Tenderness] : no CVA  tenderness [No Spinal Tenderness] : no spinal tenderness [No Joint Swelling] : no joint swelling [Grossly Normal Strength/Tone] : grossly normal strength/tone [No Rash] : no rash [Coordination Grossly Intact] : coordination grossly intact [No Focal Deficits] : no focal deficits [Normal Gait] : normal gait [Deep Tendon Reflexes (DTR)] : deep tendon reflexes were 2+ and symmetric [Normal Affect] : the affect was normal [Normal Insight/Judgement] : insight and judgment were intact

## 2021-08-22 NOTE — REVIEW OF SYSTEMS
[Fatigue] : fatigue [Nocturia] : nocturia [Joint Stiffness] : joint stiffness [Anxiety] : anxiety [Negative] : Heme/Lymph [Back Pain] : back pain [Fever] : no fever [Chills] : no chills [Hot Flashes] : no hot flashes [Night Sweats] : no night sweats [Recent Change In Weight] : ~T no recent weight change [Discharge] : no discharge [Pain] : no pain [Dryness] : no dryness  [Redness] : no redness [Vision Problems] : no vision problems [Itching] : no itching [Earache] : no earache [Hearing Loss] : no hearing loss [Nosebleed] : no nosebleeds [Hoarseness] : no hoarseness [Nasal Discharge] : no nasal discharge [Sore Throat] : no sore throat [Postnasal Drip] : no postnasal drip [Chest Pain] : no chest pain [Palpitations] : no palpitations [Leg Claudication] : no leg claudication [Lower Ext Edema] : no lower extremity edema [Orthopnea] : no orthopnea [Shortness Of Breath] : no shortness of breath [Wheezing] : no wheezing [Cough] : no cough [Dyspnea on Exertion] : no dyspnea on exertion [Abdominal Pain] : no abdominal pain [Constipation] : no constipation [Nausea] : no nausea [Diarrhea] : diarrhea [Vomiting] : no vomiting [Heartburn] : no heartburn [Melena] : no melena [Dysuria] : no dysuria [Incontinence] : no incontinence [Hematuria] : no hematuria [Poor Libido] : libido not poor [Frequency] : no frequency [Vaginal Discharge] : no vaginal discharge [Dysmenorrhea] : no dysmenorrhea [Joint Pain] : no joint pain [Joint Swelling] : no joint swelling [Muscle Weakness] : no muscle weakness [Muscle Pain] : no muscle pain [Itching] : no itching [Mole Changes] : no mole changes [Nail Changes] : no nail changes [Hair Changes] : no hair changes [Skin Rash] : no skin rash [Dizziness] : no dizziness [Headache] : no headache [Fainting] : no fainting [Confusion] : no confusion [Memory Loss] : no memory loss [Unsteady Walking] : no ataxia [Suicidal] : not suicidal [Insomnia] : no insomnia [Depression] : no depression [Easy Bleeding] : no easy bleeding [Easy Bruising] : no easy bruising [Swollen Glands] : no swollen glands

## 2021-08-22 NOTE — HEALTH RISK ASSESSMENT
[HIV test declined] : HIV test declined [Hepatitis C test declined] : Hepatitis C test declined [Yes] : Yes [No falls in past year] : Patient reported no falls in the past year [] : No [MammogramDate] : defers [BoneDensityDate] : defers [ColonoscopyDate] : 2006

## 2021-08-22 NOTE — HISTORY OF PRESENT ILLNESS
[FreeTextEntry1] : 87-year-old woman comes to the office for a comprehensive physical examination to review her medications and discuss her overall health main complaint is that of fatigue and lower back pain [de-identified] : Comes to the office for a comprehensive physical examination history of chronic fatigue lower back pain hypertension patient has been chronically fatigued denies temperature chills sweats or myalgias she has had no headache sinus congestion sore throat cough wheezing pleurisy chest pain shortness of breath exertional dyspnea lightheadedness palpitations dizziness vertigo or syncope he has had no abdominal pain nausea vomiting diarrhea constipation bright red blood per rectum or black stools her appetite has been good her weight has been stable she does get up at night to urinate but denies dysuria or gross hematuria she has had no leg edema or skin rashes she has chronic lower back pain she has bouts of anxiety but has been sleeping adequately recently

## 2021-08-22 NOTE — ASSESSMENT
[FreeTextEntry1] : Physical examination revealed elderly woman in no acute distress blood pressure 120/82 height 5 feet 4 inches weight was refused temperature was 97.9 °F heart rate of 105 respirations were 16 oxygen saturation on room air was 95% HEENT was unremarkable chest was clear cardiovascular exam was regular abdomen was soft extremities showed no clubbing cyanosis or edema neurologic exam was nonfocal patient has received the Tdap vaccine in 2012 she will decide upon the COVID-19 vaccination the pros over cons were discussed with her I believe she will seriously continue and avenues to receive it were discussed with her as well he has refused all other vaccinations over the past years she wishes to hold off on blood tests at present she was told that they can come to her house to achieve that goal defers on mammograms and colorectal cancer screening she is followed by an ophthalmologist and defers on dermatologic cancer screenings blood pressure was well controlled at the present visit

## 2021-12-30 ENCOUNTER — APPOINTMENT (OUTPATIENT)
Dept: INTERNAL MEDICINE | Facility: CLINIC | Age: 86
End: 2021-12-30
Payer: MEDICARE

## 2021-12-30 PROCEDURE — 99215 OFFICE O/P EST HI 40 MIN: CPT | Mod: CS,95

## 2022-01-07 NOTE — REVIEW OF SYSTEMS
[Fatigue] : fatigue [Recent Change In Weight] : ~T recent weight change [Cough] : cough [Nocturia] : nocturia [Joint Stiffness] : joint stiffness [Back Pain] : back pain [Anxiety] : anxiety [Negative] : Heme/Lymph [Fever] : no fever [Chills] : no chills [Hot Flashes] : no hot flashes [Night Sweats] : no night sweats [Discharge] : no discharge [Pain] : no pain [Redness] : no redness [Dryness] : no dryness  [Vision Problems] : no vision problems [Itching] : no itching [Earache] : no earache [Hearing Loss] : no hearing loss [Nosebleed] : no nosebleeds [Hoarseness] : no hoarseness [Nasal Discharge] : no nasal discharge [Sore Throat] : no sore throat [Postnasal Drip] : no postnasal drip [Chest Pain] : no chest pain [Palpitations] : no palpitations [Leg Claudication] : no leg claudication [Lower Ext Edema] : no lower extremity edema [Orthopnea] : no orthopnea [Shortness Of Breath] : no shortness of breath [Wheezing] : no wheezing [Dyspnea on Exertion] : no dyspnea on exertion [Abdominal Pain] : no abdominal pain [Nausea] : no nausea [Constipation] : no constipation [Diarrhea] : diarrhea [Vomiting] : no vomiting [Heartburn] : no heartburn [Melena] : no melena [Dysuria] : no dysuria [Incontinence] : no incontinence [Poor Libido] : libido not poor [Hematuria] : no hematuria [Frequency] : no frequency [Vaginal Discharge] : no vaginal discharge [Dysmenorrhea] : no dysmenorrhea [Joint Pain] : no joint pain [Joint Swelling] : no joint swelling [Muscle Weakness] : no muscle weakness [Muscle Pain] : no muscle pain [Mole Changes] : no mole changes [Nail Changes] : no nail changes [Hair Changes] : no hair changes [Skin Rash] : no skin rash [Headache] : no headache [Dizziness] : no dizziness [Fainting] : no fainting [Confusion] : no confusion [Memory Loss] : no memory loss [Unsteady Walking] : no ataxia [Suicidal] : not suicidal [Insomnia] : no insomnia [Depression] : no depression [Easy Bleeding] : no easy bleeding [Easy Bruising] : no easy bruising [Swollen Glands] : no swollen glands [FreeTextEntry2] : 3 lbs

## 2022-01-07 NOTE — HEALTH RISK ASSESSMENT
[Never] : Never [Yes] : Yes [No falls in past year] : Patient reported no falls in the past year [0] : 2) Feeling down, depressed, or hopeless: Not at all (0) [PHQ-2 Negative - No further assessment needed] : PHQ-2 Negative - No further assessment needed [de-identified] : occasionally [ILO0Fedin] : 0

## 2022-01-07 NOTE — HISTORY OF PRESENT ILLNESS
[Home] : at home, [unfilled] , at the time of the visit. [Medical Office: ([facility])___] : at the medical office located in  [Verbal consent obtained from patient] : the patient, [unfilled] [FreeTextEntry1] : 87-year-old woman performs a telehealth follow-up visit to review her medications and discuss her overall health patient recently treated at [facility] for COVID-19 [de-identified] : Telehealth video follow-up 87-year-old woman with a history of hypertension lower back pain chronic fatigue patient developed symptoms the day before Christmas day went to the emergency room at [facility] on 25 December 2021 was tested positive for COVID because of an abnormal chest x-ray and probably pneumonia patient was admitted to the hospital patient was discharged on several more days of dexamethasone and benzonatate patient has been doing well with improving appetite mild cough nonproductive no aches and pains no headache she is lost only a small amount of weight she is fatigued and has no shortness of breath or exertional dyspnea he has had no lightheadedness palpitations chest pain abdominal pain nausea vomiting diarrhea constipation she has occasional urinary incontinence and frequency but denies dysuria or gross hematuria he has had no leg edema skin rashes she has chronic lower back pain she has bouts of it anxiety but has been sleeping adequately

## 2022-01-07 NOTE — ASSESSMENT
[FreeTextEntry1] : Patient has recovered nicely from her COVID-19 infection we will obtain records from [facility] as to what was administered to her no further need for medications or therapeutics at this point patient is 5 days out from being swabbed and  can can come off quarantine over the next several days Patient has received her Tdap vaccine in 2012 she will be due for another one in 2022 patient I believe had received 1 dose of the COVID-19 vaccine after much discussion we will obtain blood test performed while at [facility] she defers on mammography is colorectal cancer screening is followed by an ophthalmologist but defers on dermatologic skin cancer checks

## 2022-02-19 ENCOUNTER — RX RENEWAL (OUTPATIENT)
Age: 87
End: 2022-02-19

## 2022-04-10 ENCOUNTER — EMERGENCY (EMERGENCY)
Facility: HOSPITAL | Age: 87
LOS: 1 days | Discharge: HOME | End: 2022-04-10
Attending: EMERGENCY MEDICINE | Admitting: EMERGENCY MEDICINE
Payer: MEDICARE

## 2022-04-10 VITALS
RESPIRATION RATE: 18 BRPM | WEIGHT: 164.91 LBS | HEART RATE: 77 BPM | OXYGEN SATURATION: 96 % | SYSTOLIC BLOOD PRESSURE: 157 MMHG | HEIGHT: 65 IN | TEMPERATURE: 98 F | DIASTOLIC BLOOD PRESSURE: 82 MMHG

## 2022-04-10 VITALS
OXYGEN SATURATION: 96 % | SYSTOLIC BLOOD PRESSURE: 146 MMHG | TEMPERATURE: 98 F | RESPIRATION RATE: 19 BRPM | HEART RATE: 70 BPM | DIASTOLIC BLOOD PRESSURE: 78 MMHG

## 2022-04-10 LAB
ALBUMIN SERPL ELPH-MCNC: 3.3 G/DL — SIGNIFICANT CHANGE UP (ref 3.3–5)
ALP SERPL-CCNC: 63 U/L — SIGNIFICANT CHANGE UP (ref 40–120)
ALT FLD-CCNC: 16 U/L — SIGNIFICANT CHANGE UP (ref 10–45)
ANION GAP SERPL CALC-SCNC: 6 MMOL/L — SIGNIFICANT CHANGE UP (ref 5–17)
AST SERPL-CCNC: 16 U/L — SIGNIFICANT CHANGE UP (ref 10–40)
BASOPHILS # BLD AUTO: 0.02 K/UL — SIGNIFICANT CHANGE UP (ref 0–0.2)
BASOPHILS NFR BLD AUTO: 0.4 % — SIGNIFICANT CHANGE UP (ref 0–2)
BILIRUB SERPL-MCNC: 0.5 MG/DL — SIGNIFICANT CHANGE UP (ref 0.2–1.2)
BUN SERPL-MCNC: 23 MG/DL — SIGNIFICANT CHANGE UP (ref 7–23)
CALCIUM SERPL-MCNC: 8.8 MG/DL — SIGNIFICANT CHANGE UP (ref 8.4–10.5)
CHLORIDE SERPL-SCNC: 110 MMOL/L — HIGH (ref 96–108)
CO2 SERPL-SCNC: 30 MMOL/L — SIGNIFICANT CHANGE UP (ref 22–31)
CREAT SERPL-MCNC: 1.09 MG/DL — SIGNIFICANT CHANGE UP (ref 0.5–1.3)
EGFR: 49 ML/MIN/1.73M2 — LOW
EOSINOPHIL # BLD AUTO: 0.13 K/UL — SIGNIFICANT CHANGE UP (ref 0–0.5)
EOSINOPHIL NFR BLD AUTO: 2.5 % — SIGNIFICANT CHANGE UP (ref 0–6)
GLUCOSE SERPL-MCNC: 120 MG/DL — HIGH (ref 70–99)
HCT VFR BLD CALC: 42.9 % — SIGNIFICANT CHANGE UP (ref 34.5–45)
HGB BLD-MCNC: 13.9 G/DL — SIGNIFICANT CHANGE UP (ref 11.5–15.5)
IMM GRANULOCYTES NFR BLD AUTO: 0.2 % — SIGNIFICANT CHANGE UP (ref 0–1.5)
LIDOCAIN IGE QN: 230 U/L — SIGNIFICANT CHANGE UP (ref 73–393)
LYMPHOCYTES # BLD AUTO: 0.52 K/UL — LOW (ref 1–3.3)
LYMPHOCYTES # BLD AUTO: 10.1 % — LOW (ref 13–44)
MCHC RBC-ENTMCNC: 30.8 PG — SIGNIFICANT CHANGE UP (ref 27–34)
MCHC RBC-ENTMCNC: 32.4 GM/DL — SIGNIFICANT CHANGE UP (ref 32–36)
MCV RBC AUTO: 95.1 FL — SIGNIFICANT CHANGE UP (ref 80–100)
MONOCYTES # BLD AUTO: 0.63 K/UL — SIGNIFICANT CHANGE UP (ref 0–0.9)
MONOCYTES NFR BLD AUTO: 12.2 % — SIGNIFICANT CHANGE UP (ref 2–14)
NEUTROPHILS # BLD AUTO: 3.86 K/UL — SIGNIFICANT CHANGE UP (ref 1.8–7.4)
NEUTROPHILS NFR BLD AUTO: 74.6 % — SIGNIFICANT CHANGE UP (ref 43–77)
NRBC # BLD: 0 /100 WBCS — SIGNIFICANT CHANGE UP (ref 0–0)
PLATELET # BLD AUTO: 177 K/UL — SIGNIFICANT CHANGE UP (ref 150–400)
POTASSIUM SERPL-MCNC: 3.9 MMOL/L — SIGNIFICANT CHANGE UP (ref 3.5–5.3)
POTASSIUM SERPL-SCNC: 3.9 MMOL/L — SIGNIFICANT CHANGE UP (ref 3.5–5.3)
PROT SERPL-MCNC: 6.7 G/DL — SIGNIFICANT CHANGE UP (ref 6–8.3)
RBC # BLD: 4.51 M/UL — SIGNIFICANT CHANGE UP (ref 3.8–5.2)
RBC # FLD: 14 % — SIGNIFICANT CHANGE UP (ref 10.3–14.5)
SODIUM SERPL-SCNC: 146 MMOL/L — HIGH (ref 135–145)
WBC # BLD: 5.17 K/UL — SIGNIFICANT CHANGE UP (ref 3.8–10.5)
WBC # FLD AUTO: 5.17 K/UL — SIGNIFICANT CHANGE UP (ref 3.8–10.5)

## 2022-04-10 PROCEDURE — 96374 THER/PROPH/DIAG INJ IV PUSH: CPT | Mod: XU

## 2022-04-10 PROCEDURE — 74177 CT ABD & PELVIS W/CONTRAST: CPT | Mod: 26,MA

## 2022-04-10 PROCEDURE — 36415 COLL VENOUS BLD VENIPUNCTURE: CPT

## 2022-04-10 PROCEDURE — 99284 EMERGENCY DEPT VISIT MOD MDM: CPT

## 2022-04-10 PROCEDURE — 99284 EMERGENCY DEPT VISIT MOD MDM: CPT | Mod: 25

## 2022-04-10 PROCEDURE — 74177 CT ABD & PELVIS W/CONTRAST: CPT | Mod: MA

## 2022-04-10 PROCEDURE — 87086 URINE CULTURE/COLONY COUNT: CPT

## 2022-04-10 PROCEDURE — 96361 HYDRATE IV INFUSION ADD-ON: CPT

## 2022-04-10 PROCEDURE — 85025 COMPLETE CBC W/AUTO DIFF WBC: CPT

## 2022-04-10 PROCEDURE — 80053 COMPREHEN METABOLIC PANEL: CPT

## 2022-04-10 PROCEDURE — 83690 ASSAY OF LIPASE: CPT

## 2022-04-10 RX ORDER — ACETAMINOPHEN 500 MG
650 TABLET ORAL ONCE
Refills: 0 | Status: COMPLETED | OUTPATIENT
Start: 2022-04-10 | End: 2022-04-10

## 2022-04-10 RX ORDER — ACETAMINOPHEN 500 MG
2 TABLET ORAL
Qty: 48 | Refills: 0
Start: 2022-04-10 | End: 2022-04-13

## 2022-04-10 RX ORDER — IBUPROFEN 200 MG
1 TABLET ORAL
Qty: 12 | Refills: 0
Start: 2022-04-10 | End: 2022-04-12

## 2022-04-10 RX ORDER — METHOCARBAMOL 500 MG/1
750 TABLET, FILM COATED ORAL ONCE
Refills: 0 | Status: COMPLETED | OUTPATIENT
Start: 2022-04-10 | End: 2022-04-10

## 2022-04-10 RX ORDER — METHOCARBAMOL 500 MG/1
1 TABLET, FILM COATED ORAL
Qty: 15 | Refills: 0
Start: 2022-04-10 | End: 2022-04-14

## 2022-04-10 RX ORDER — KETOROLAC TROMETHAMINE 30 MG/ML
30 SYRINGE (ML) INJECTION ONCE
Refills: 0 | Status: DISCONTINUED | OUTPATIENT
Start: 2022-04-10 | End: 2022-04-10

## 2022-04-10 RX ORDER — SODIUM CHLORIDE 9 MG/ML
1000 INJECTION INTRAMUSCULAR; INTRAVENOUS; SUBCUTANEOUS ONCE
Refills: 0 | Status: COMPLETED | OUTPATIENT
Start: 2022-04-10 | End: 2022-04-10

## 2022-04-10 RX ORDER — LIDOCAINE 4 G/100G
1 CREAM TOPICAL
Qty: 30 | Refills: 0
Start: 2022-04-10 | End: 2022-05-09

## 2022-04-10 RX ADMIN — SODIUM CHLORIDE 1000 MILLILITER(S): 9 INJECTION INTRAMUSCULAR; INTRAVENOUS; SUBCUTANEOUS at 07:52

## 2022-04-10 RX ADMIN — SODIUM CHLORIDE 1000 MILLILITER(S): 9 INJECTION INTRAMUSCULAR; INTRAVENOUS; SUBCUTANEOUS at 09:00

## 2022-04-10 RX ADMIN — Medication 30 MILLIGRAM(S): at 10:38

## 2022-04-10 RX ADMIN — Medication 650 MILLIGRAM(S): at 08:50

## 2022-04-10 RX ADMIN — METHOCARBAMOL 750 MILLIGRAM(S): 500 TABLET, FILM COATED ORAL at 10:23

## 2022-04-10 RX ADMIN — Medication 30 MILLIGRAM(S): at 10:23

## 2022-04-10 RX ADMIN — Medication 650 MILLIGRAM(S): at 07:56

## 2022-04-10 NOTE — ED PROVIDER NOTE - PATIENT PORTAL LINK FT
You can access the FollowMyHealth Patient Portal offered by [facility] by registering at the following website: [email or web address]. By joining [employer]’s FollowMyHealth portal, you will also be able to view your health information using other applications (apps) compatible with our system.

## 2022-04-10 NOTE — ED ADULT NURSE NOTE - OBJECTIVE STATEMENT
Pt presents to ED from home for right sided abdominal pain. Pt states this pain has been going on for 6 months but has worsened for the past 4-5 days. Denies fever. Denies trouble urinating.

## 2022-04-10 NOTE — ED PROVIDER NOTE - OBJECTIVE STATEMENT
87F c/o dull/ache to right lower back pain with radiation to the anterior abdomen. No alleviating or aggravating factors. No numbness or tingling. Duration: 4-5 days. Constant. Sometimes, it feels sharp. No n/v/d. No trauma. No dysuria.     PMH: HTN  PSH: None  NKDA  SH : Occasional alcohol; No drug use.

## 2022-04-10 NOTE — ED PROVIDER NOTE - CLINICAL SUMMARY MEDICAL DECISION MAKING FREE TEXT BOX
87F c/o dull/ache to right lower back pain with radiation to the anterior abdomen. No alleviating or aggravating factors. No numbness or tingling. Duration: 4-5 days. Constant. Sometimes, it feels sharp. No n/v/d. No trauma. No dysuria.   Exam as stated. Will check labs and CT. 87F c/o dull/ache to right lower back pain with radiation to the anterior abdomen. No alleviating or aggravating factors. No numbness or tingling. Duration: 4-5 days. Constant. Sometimes, it feels sharp. No n/v/d. No trauma. No dysuria.   Exam as stated. Will check labs and CT.  Patient results reviewed. Pancreatic findings noted. Advised f/u with Dr Escobar. Patient family at bedside.   CT was done b/c of the RLQ abd tenderness but there was no intraabdominal pathology.   Plan for meds to aide in MSK related pain.   Worsening, continued or ANY new concerning symptoms return to the emergency department.

## 2022-04-10 NOTE — ED PROVIDER NOTE - CARE PROVIDER_API CALL
Chago Deal  GASTROENTEROLOGY  [address] [place] [address], [address]  [place], [place] [id number]  Phone: [phone]  Fax: [phone]  Follow Up Time:

## 2022-04-10 NOTE — ED PROVIDER NOTE - NSFOLLOWUPINSTRUCTIONS_ED_ALL_ED_FT
Back Pain    Back pain is very common in adults. The cause of back pain is rarely dangerous and the pain often gets better over time. The cause of your back pain may not be known and may include strain of muscles or ligaments, degeneration of the spinal disks, or arthritis. Occasionally the pain may radiate down your leg(s). Over-the-counter medicines to reduce pain and inflammation are often the most helpful. Stretching and remaining active frequently helps the healing process.     SEEK IMMEDIATE MEDICAL CARE IF YOU HAVE ANY OF THE FOLLOWING SYMPTOMS: bowel or bladder control problems, unusual weakness or numbness in your arms or legs, nausea or vomiting, abdominal pain, fever, dizziness/lightheadedness.    Follow up with Dr. Escobar regarding the CT scan results about the pancreas. We highlighted the info.  Meanwhile, take medicine as needed for pain as prescribed. Worsening, continued or ANY new concerning symptoms return to the emergency department.

## 2022-04-11 LAB
CULTURE RESULTS: SIGNIFICANT CHANGE UP
SPECIMEN SOURCE: SIGNIFICANT CHANGE UP

## 2022-04-13 RX ORDER — AMOXICILLIN 250 MG/5ML
1 SUSPENSION, RECONSTITUTED, ORAL (ML) ORAL
Qty: 20 | Refills: 0
Start: 2022-04-13 | End: 2022-04-22

## 2022-04-18 ENCOUNTER — INPATIENT (INPATIENT)
Facility: HOSPITAL | Age: 87
LOS: 1 days | Discharge: HOME | DRG: 552 | End: 2022-04-20
Attending: INTERNAL MEDICINE | Admitting: INTERNAL MEDICINE
Payer: MEDICARE

## 2022-04-18 ENCOUNTER — APPOINTMENT (OUTPATIENT)
Dept: INTERNAL MEDICINE | Facility: CLINIC | Age: 87
End: 2022-04-18
Payer: MEDICARE

## 2022-04-18 VITALS
RESPIRATION RATE: 16 BRPM | TEMPERATURE: 99 F | HEIGHT: 65 IN | OXYGEN SATURATION: 93 % | SYSTOLIC BLOOD PRESSURE: 173 MMHG | DIASTOLIC BLOOD PRESSURE: 98 MMHG | WEIGHT: 169.98 LBS | HEART RATE: 90 BPM

## 2022-04-18 VITALS
HEART RATE: 91 BPM | RESPIRATION RATE: 16 BRPM | TEMPERATURE: 97.4 F | HEIGHT: 64 IN | DIASTOLIC BLOOD PRESSURE: 90 MMHG | WEIGHT: 160 LBS | OXYGEN SATURATION: 100 % | SYSTOLIC BLOOD PRESSURE: 130 MMHG | BODY MASS INDEX: 27.31 KG/M2

## 2022-04-18 DIAGNOSIS — M43.9 DEFORMING DORSOPATHY, UNSPECIFIED: ICD-10-CM

## 2022-04-18 PROBLEM — I10 ESSENTIAL (PRIMARY) HYPERTENSION: Chronic | Status: ACTIVE | Noted: 2022-04-10

## 2022-04-18 LAB
ALBUMIN SERPL ELPH-MCNC: 3.2 G/DL — LOW (ref 3.3–5)
ALP SERPL-CCNC: 75 U/L — SIGNIFICANT CHANGE UP (ref 40–120)
ALT FLD-CCNC: 31 U/L — SIGNIFICANT CHANGE UP (ref 10–45)
ANION GAP SERPL CALC-SCNC: 10 MMOL/L — SIGNIFICANT CHANGE UP (ref 5–17)
APPEARANCE UR: CLEAR — SIGNIFICANT CHANGE UP
AST SERPL-CCNC: 21 U/L — SIGNIFICANT CHANGE UP (ref 10–40)
BACTERIA # UR AUTO: ABNORMAL /HPF
BASOPHILS # BLD AUTO: 0.03 K/UL — SIGNIFICANT CHANGE UP (ref 0–0.2)
BASOPHILS NFR BLD AUTO: 0.4 % — SIGNIFICANT CHANGE UP (ref 0–2)
BILIRUB SERPL-MCNC: 0.3 MG/DL — SIGNIFICANT CHANGE UP (ref 0.2–1.2)
BILIRUB UR-MCNC: NEGATIVE — SIGNIFICANT CHANGE UP
BUN SERPL-MCNC: 21 MG/DL — SIGNIFICANT CHANGE UP (ref 7–23)
CALCIUM SERPL-MCNC: 8.8 MG/DL — SIGNIFICANT CHANGE UP (ref 8.4–10.5)
CHLORIDE SERPL-SCNC: 105 MMOL/L — SIGNIFICANT CHANGE UP (ref 96–108)
CO2 SERPL-SCNC: 28 MMOL/L — SIGNIFICANT CHANGE UP (ref 22–31)
COLOR SPEC: YELLOW — SIGNIFICANT CHANGE UP
CREAT SERPL-MCNC: 0.98 MG/DL — SIGNIFICANT CHANGE UP (ref 0.5–1.3)
DIFF PNL FLD: ABNORMAL
EGFR: 56 ML/MIN/1.73M2 — LOW
EOSINOPHIL # BLD AUTO: 0.08 K/UL — SIGNIFICANT CHANGE UP (ref 0–0.5)
EOSINOPHIL NFR BLD AUTO: 1.1 % — SIGNIFICANT CHANGE UP (ref 0–6)
EPI CELLS # UR: SIGNIFICANT CHANGE UP
GLUCOSE SERPL-MCNC: 126 MG/DL — HIGH (ref 70–99)
GLUCOSE UR QL: NEGATIVE — SIGNIFICANT CHANGE UP
HCT VFR BLD CALC: 43.2 % — SIGNIFICANT CHANGE UP (ref 34.5–45)
HGB BLD-MCNC: 14.2 G/DL — SIGNIFICANT CHANGE UP (ref 11.5–15.5)
IMM GRANULOCYTES NFR BLD AUTO: 0.3 % — SIGNIFICANT CHANGE UP (ref 0–1.5)
KETONES UR-MCNC: ABNORMAL
LEUKOCYTE ESTERASE UR-ACNC: NEGATIVE — SIGNIFICANT CHANGE UP
LYMPHOCYTES # BLD AUTO: 1 K/UL — SIGNIFICANT CHANGE UP (ref 1–3.3)
LYMPHOCYTES # BLD AUTO: 14.1 % — SIGNIFICANT CHANGE UP (ref 13–44)
MCHC RBC-ENTMCNC: 30.6 PG — SIGNIFICANT CHANGE UP (ref 27–34)
MCHC RBC-ENTMCNC: 32.9 GM/DL — SIGNIFICANT CHANGE UP (ref 32–36)
MCV RBC AUTO: 93.1 FL — SIGNIFICANT CHANGE UP (ref 80–100)
MONOCYTES # BLD AUTO: 0.64 K/UL — SIGNIFICANT CHANGE UP (ref 0–0.9)
MONOCYTES NFR BLD AUTO: 9 % — SIGNIFICANT CHANGE UP (ref 2–14)
NEUTROPHILS # BLD AUTO: 5.32 K/UL — SIGNIFICANT CHANGE UP (ref 1.8–7.4)
NEUTROPHILS NFR BLD AUTO: 75.1 % — SIGNIFICANT CHANGE UP (ref 43–77)
NITRITE UR-MCNC: NEGATIVE — SIGNIFICANT CHANGE UP
NRBC # BLD: 0 /100 WBCS — SIGNIFICANT CHANGE UP (ref 0–0)
PH UR: 5 — SIGNIFICANT CHANGE UP (ref 5–8)
PLATELET # BLD AUTO: 213 K/UL — SIGNIFICANT CHANGE UP (ref 150–400)
POTASSIUM SERPL-MCNC: 3.4 MMOL/L — LOW (ref 3.5–5.3)
POTASSIUM SERPL-SCNC: 3.4 MMOL/L — LOW (ref 3.5–5.3)
PROT SERPL-MCNC: 7 G/DL — SIGNIFICANT CHANGE UP (ref 6–8.3)
PROT UR-MCNC: 30 MG/DL
RBC # BLD: 4.64 M/UL — SIGNIFICANT CHANGE UP (ref 3.8–5.2)
RBC # FLD: 13.6 % — SIGNIFICANT CHANGE UP (ref 10.3–14.5)
RBC CASTS # UR COMP ASSIST: >50 /HPF (ref 0–4)
SARS-COV-2 RNA SPEC QL NAA+PROBE: SIGNIFICANT CHANGE UP
SODIUM SERPL-SCNC: 143 MMOL/L — SIGNIFICANT CHANGE UP (ref 135–145)
SP GR SPEC: 1.02 — SIGNIFICANT CHANGE UP (ref 1.01–1.02)
UROBILINOGEN FLD QL: NEGATIVE — SIGNIFICANT CHANGE UP
WBC # BLD: 7.09 K/UL — SIGNIFICANT CHANGE UP (ref 3.8–10.5)
WBC # FLD AUTO: 7.09 K/UL — SIGNIFICANT CHANGE UP (ref 3.8–10.5)
WBC UR QL: NEGATIVE /HPF — SIGNIFICANT CHANGE UP (ref 0–5)

## 2022-04-18 PROCEDURE — 72192 CT PELVIS W/O DYE: CPT | Mod: 26,MA

## 2022-04-18 PROCEDURE — 99223 1ST HOSP IP/OBS HIGH 75: CPT

## 2022-04-18 PROCEDURE — 99215 OFFICE O/P EST HI 40 MIN: CPT

## 2022-04-18 PROCEDURE — 71045 X-RAY EXAM CHEST 1 VIEW: CPT | Mod: 26

## 2022-04-18 PROCEDURE — 99285 EMERGENCY DEPT VISIT HI MDM: CPT

## 2022-04-18 PROCEDURE — 93010 ELECTROCARDIOGRAM REPORT: CPT

## 2022-04-18 PROCEDURE — 72131 CT LUMBAR SPINE W/O DYE: CPT | Mod: 26,MA

## 2022-04-18 RX ORDER — IBUPROFEN 200 MG
400 TABLET ORAL THREE TIMES A DAY
Refills: 0 | Status: DISCONTINUED | OUTPATIENT
Start: 2022-04-18 | End: 2022-04-20

## 2022-04-18 RX ORDER — CEFTRIAXONE 500 MG/1
1000 INJECTION, POWDER, FOR SOLUTION INTRAMUSCULAR; INTRAVENOUS ONCE
Refills: 0 | Status: COMPLETED | OUTPATIENT
Start: 2022-04-18 | End: 2022-04-18

## 2022-04-18 RX ORDER — CYCLOBENZAPRINE HYDROCHLORIDE 10 MG/1
10 TABLET, FILM COATED ORAL ONCE
Refills: 0 | Status: COMPLETED | OUTPATIENT
Start: 2022-04-18 | End: 2022-04-18

## 2022-04-18 RX ORDER — ENOXAPARIN SODIUM 100 MG/ML
40 INJECTION SUBCUTANEOUS EVERY 24 HOURS
Refills: 0 | Status: DISCONTINUED | OUTPATIENT
Start: 2022-04-18 | End: 2022-04-20

## 2022-04-18 RX ORDER — CYCLOBENZAPRINE HYDROCHLORIDE 10 MG/1
5 TABLET, FILM COATED ORAL
Refills: 0 | Status: DISCONTINUED | OUTPATIENT
Start: 2022-04-19 | End: 2022-04-20

## 2022-04-18 RX ORDER — POTASSIUM CHLORIDE 20 MEQ
20 PACKET (EA) ORAL ONCE
Refills: 0 | Status: COMPLETED | OUTPATIENT
Start: 2022-04-18 | End: 2022-04-18

## 2022-04-18 RX ORDER — LIDOCAINE 4 G/100G
1 CREAM TOPICAL EVERY 24 HOURS
Refills: 0 | Status: DISCONTINUED | OUTPATIENT
Start: 2022-04-18 | End: 2022-04-20

## 2022-04-18 RX ORDER — ACETAMINOPHEN 500 MG
650 TABLET ORAL EVERY 6 HOURS
Refills: 0 | Status: DISCONTINUED | OUTPATIENT
Start: 2022-04-18 | End: 2022-04-20

## 2022-04-18 RX ORDER — ACETAMINOPHEN 500 MG
975 TABLET ORAL ONCE
Refills: 0 | Status: COMPLETED | OUTPATIENT
Start: 2022-04-18 | End: 2022-04-18

## 2022-04-18 RX ORDER — OXYCODONE AND ACETAMINOPHEN 5; 325 MG/1; MG/1
1 TABLET ORAL EVERY 4 HOURS
Refills: 0 | Status: DISCONTINUED | OUTPATIENT
Start: 2022-04-18 | End: 2022-04-20

## 2022-04-18 RX ADMIN — CEFTRIAXONE 1000 MILLIGRAM(S): 500 INJECTION, POWDER, FOR SOLUTION INTRAMUSCULAR; INTRAVENOUS at 18:37

## 2022-04-18 RX ADMIN — Medication 975 MILLIGRAM(S): at 18:15

## 2022-04-18 RX ADMIN — Medication 20 MILLIEQUIVALENT(S): at 20:54

## 2022-04-18 RX ADMIN — LIDOCAINE 1 PATCH: 4 CREAM TOPICAL at 20:54

## 2022-04-18 RX ADMIN — CYCLOBENZAPRINE HYDROCHLORIDE 10 MILLIGRAM(S): 10 TABLET, FILM COATED ORAL at 20:54

## 2022-04-18 RX ADMIN — Medication 975 MILLIGRAM(S): at 18:37

## 2022-04-18 RX ADMIN — CEFTRIAXONE 100 MILLIGRAM(S): 500 INJECTION, POWDER, FOR SOLUTION INTRAMUSCULAR; INTRAVENOUS at 18:15

## 2022-04-18 NOTE — H&P ADULT - ASSESSMENT
88 yo female with HTN (non compliant with meds), presents to the ED c/o worsened back pain.  Pt states she's had lower back pain for the past 3-4 months,  states she fell few months ago, but never had medical eval at that time.  Pt states she has been taking Tylenol, Lidoderm patch, occ Motrin with not much relief.   Pt was in ED last week, c/o same, was treated for possible UTI, was prescribed Amoxicillin.    Today, pt states back pain worse, so called PMD, was advised to go to ED for further eval.  Pt denies any other recent trauma, denies fever, chills, chest pain,  dyspnea, dysuria.  Pt was accompanied by daughter in law who states pt has urinary incontinence, also reports that pt with worsening memory loss and  confusion.  Pt's son, who lives next door to pt, is currently out of town, so pt is at home by herself.   CT Lumbar spine reports compression deformity of L1 vertebral body (approximately 40% height loss), possibly subacute to chronic in nature.  Lumbar spondylosis.     Back pain , due to lumbar compression fracture/deformity  Dementia  Hypertension  Hypokalemia, mild    Admit  Analgesics prn, muscle relaxant prn  Replete K  Resume Losartan  PT eval  Social work referral    IMPROVE VTE Individual Risk Assessment  RISK                                                                Points  [  ] Previous VTE                                                  3  [  ] Thrombophilia                                               2  [  ] Lower limb paralysis                                      2        (unable to hold up >15 seconds)    [  ] Current Cancer                                              2         (within 6 months)  [  ] Immobilization > 24 hrs                                1  [  ] ICU/CCU stay > 24 hours                              1  [ x ] Age > 60                                                      1  IMPROVE VTE Score __1_______  IMPROVE Score 0-1: Low Risk, No VTE prophylaxis required for most patients, encourage ambulation.   IMPROVE Score 2-3: At risk, pharmacologic VTE prophylaxis is indicated for most patients (in the absence of a contraindication)  IMPROVE Score > or = 4: High Risk, pharmacologic VTE prophylaxis is indicated for most patients (in the absence of a contraindication) 88 yo female with HTN (non compliant with meds), presents to the ED c/o worsened back pain.  Pt states she's had lower back pain for the past 3-4 months,  states she fell few months ago, but never had medical eval at that time.  Pt states she has been taking Tylenol, Lidoderm patch, occ Motrin with not much relief.   Pt was in ED last week, c/o same, was treated for possible UTI, was prescribed Amoxicillin.    Today, pt states back pain worse, so called PMD, was advised to go to ED for further eval.  Pt denies any other recent trauma, denies fever, chills, chest pain,  dyspnea, dysuria.  Pt was accompanied by daughter in law who states pt has urinary incontinence, also reports that pt with worsening memory loss and  confusion.  Pt's son, who lives next door to pt, is currently out of town, so pt is at home by herself.   CT Lumbar spine reports compression deformity of L1 vertebral body (approximately 40% height loss), possibly subacute to chronic in nature.  Lumbar spondylosis.     Back pain , due to lumbar compression fracture/deformity  Dementia  Hypertension  Hypokalemia, mild    Admit  Analgesics prn, muscle relaxant prn  Replete K  Resume Losartan  Would hold off further antibx pending urine culture  PT eval  Social work referral    IMPROVE VTE Individual Risk Assessment  RISK                                                                Points  [  ] Previous VTE                                                  3  [  ] Thrombophilia                                               2  [  ] Lower limb paralysis                                      2        (unable to hold up >15 seconds)    [  ] Current Cancer                                              2         (within 6 months)  [  ] Immobilization > 24 hrs                                1  [  ] ICU/CCU stay > 24 hours                              1  [ x ] Age > 60                                                      1  IMPROVE VTE Score __1_______  IMPROVE Score 0-1: Low Risk, No VTE prophylaxis required for most patients, encourage ambulation.   IMPROVE Score 2-3: At risk, pharmacologic VTE prophylaxis is indicated for most patients (in the absence of a contraindication)  IMPROVE Score > or = 4: High Risk, pharmacologic VTE prophylaxis is indicated for most patients (in the absence of a contraindication)

## 2022-04-18 NOTE — ED PROVIDER NOTE - SKIN, MLM
Skin normal color for race, warm, dry and intact. No evidence of rash. No sacral erythema or breakdown noted.
seat belt

## 2022-04-18 NOTE — ED ADULT TRIAGE NOTE - IDEAL BODY WEIGHT(KG)
Chief complaint : Chest pain    HISTORY OF PRESENT ILLNESS :  Location : Upper anterior, no radiation    Quality : Tightness    Quantity : Intermittent, lasting 10 to 12 minutes at a time, has come and gone throughout the day    Timing : Onset this morning around 9:00 on Thursday, September 23  Most recent episode which brought him in tonight started at 1 AM seated rest while talking to his wife, again no more than 10 to 12 minutes    Severity : Mild    Context : History of CAD, reports heart attack around 2000 with stenting at that time    Alleviating / exacerbating factors : No nitroglycerin attempted    Associated Symptoms : No nausea, no diaphoresis, no dyspnea. Past Medical History:   Diagnosis Date    CAD (coronary artery disease)     GERD (gastroesophageal reflux disease)     HTN (hypertension)     Hypercholesteremia     Prediabetes     no meds       Past Surgical History:   Procedure Laterality Date    HX KNEE ARTHROSCOPY      to L    HX PTCA  2000    stents x 2    HX SHOULDER ARTHROSCOPY      to L         No family history on file. Social History     Socioeconomic History    Marital status:      Spouse name: Not on file    Number of children: Not on file    Years of education: Not on file    Highest education level: Not on file   Occupational History    Not on file   Tobacco Use    Smoking status: Never Smoker   Substance and Sexual Activity    Alcohol use: No    Drug use: No    Sexual activity: Not on file   Other Topics Concern    Not on file   Social History Narrative    Not on file     Social Determinants of Health     Financial Resource Strain:     Difficulty of Paying Living Expenses:    Food Insecurity:     Worried About Running Out of Food in the Last Year:     [address] in the Last Year:    Transportation Needs:     Lack of Transportation (Medical):      Lack of Transportation (Non-Medical):    Physical Activity:     Days of Exercise per Week:  Minutes of Exercise per Session:    Stress:     Feeling of Stress :    Social Connections:     Frequency of Communication with Friends and Family:     Frequency of Social Gatherings with Friends and Family:     Attends Rastafarian Services:     Active Member of Clubs or Organizations:     Attends Club or Organization Meetings:     Marital Status:    Intimate Partner Violence:     Fear of Current or Ex-Partner:     Emotionally Abused:     Physically Abused:     Sexually Abused: ALLERGIES: Patient has no known allergies. Review of Systems   Constitutional: Negative for chills and fever. HENT: Negative for rhinorrhea and sore throat. Eyes: Negative for discharge and redness. Respiratory: Negative for cough and shortness of breath. Cardiovascular: Positive for chest pain. Negative for palpitations. Gastrointestinal: Negative for abdominal pain, diarrhea, nausea and vomiting. Musculoskeletal: Negative for arthralgias and back pain. Skin: Negative for rash. Neurological: Negative for dizziness and headaches. All other systems reviewed and are negative. Vitals:    09/24/21 0206   BP: (!) 156/75   Pulse: (!) 107   Resp: 18   Temp: 99.1 °F (37.3 °C)   SpO2: 97%   Weight: 113.4 kg (250 lb)   Height: 6' (1.829 m)            Physical Exam  Vitals and nursing note reviewed. Constitutional:       General: He is not in acute distress. Appearance: Normal appearance. He is well-developed. He is not ill-appearing, toxic-appearing or diaphoretic. HENT:      Head: Normocephalic and atraumatic. Right Ear: External ear normal.      Left Ear: External ear normal.   Eyes:      General: No scleral icterus. Right eye: No discharge. Left eye: No discharge. Extraocular Movements: Extraocular movements intact. Conjunctiva/sclera: Conjunctivae normal.      Pupils: Pupils are equal, round, and reactive to light. Neck:      Thyroid: No thyromegaly. Trachea: Trachea normal.   Cardiovascular:      Rate and Rhythm: Normal rate and regular rhythm. Heart sounds: Normal heart sounds. No murmur heard. No gallop. Pulmonary:      Effort: Pulmonary effort is normal. No respiratory distress. Breath sounds: Normal breath sounds. No wheezing or rales. Abdominal:      General: Abdomen is protuberant. Palpations: Abdomen is soft. There is no fluid wave or pulsatile mass. Tenderness: There is no abdominal tenderness. There is no guarding. Musculoskeletal:         General: Swelling present. Normal range of motion. Cervical back: Normal range of motion and neck supple. Normal range of motion. Right lower leg: Edema (Slightly worse at right ankle) present. Left lower leg: Edema present. Lymphadenopathy:      Cervical: No cervical adenopathy. Skin:     General: Skin is warm and dry. Neurological:      General: No focal deficit present. Mental Status: He is alert and oriented to person, place, and time. Mental status is at baseline. Motor: No abnormal muscle tone. Comments: cni 2-12 grossly   Psychiatric:         Mood and Affect: Mood normal.         Behavior: Behavior normal.          MDM  Number of Diagnoses or Management Options  Non Q wave myocardial infarction [facility])  Diagnosis management comments: Medical decision making note:  25-year-old gentleman with coronary artery disease presents with intermittent chest tightness differential diagnosis include AMI, unstable angina pectoris, esophageal spasm, musculoskeletal chest pain. EKG shows some changes with inferior T wave inversions and lateral T wave inversions which are new from 2017 no acute MI documented.   Pain-free at the moment, awaiting initial troponin to guide further management    3:07 AM trop >1,000 remains pain-free  Draw coags, start heparin, apply Nitropaste  We will consult [facility]ology at [facility] for transfer    This concludes the \"medical decision making note\" part of this emergency department visit note. Amount and/or Complexity of Data Reviewed  Clinical lab tests: ordered and reviewed (Results Include:    Recent Results (from the past 24 hour(s))  -TROPONIN-HIGH SENSITIVITY  Collection Time: 09/24/21  2:23 AM       Result                      Value             Ref Range           Troponin-High Sensitiv*     1,259.7 (HH)      0 - 14 pg/mL   -CBC WITH AUTOMATED DIFF  Collection Time: 09/24/21  2:23 AM       Result                      Value             Ref Range           WBC                         5.4               4.3 - 11.1 K*       RBC                         4.16 (L)          4.23 - 5.6 M*       HGB                         12.2 (L)          13.6 - 17.2 *       HCT                         36.1 (L)          41.1 - 50.3 %       MCV                         86.8              79.6 - 97.8 *       MCH                         29.3              26.1 - 32.9 *       MCHC                        33.8              31.4 - 35.0 *       RDW                         13.9              11.9 - 14.6 %       PLATELET                    199               150 - 450 K/*       MPV                         9.9               9.4 - 12.3 FL       ABSOLUTE NRBC               0.00              0.0 - 0.2 K/*       DF                          AUTOMATED                             NEUTROPHILS                 63                43 - 78 %           LYMPHOCYTES                 21                13 - 44 %           MONOCYTES                   12                4.0 - 12.0 %        EOSINOPHILS                 4                 0.5 - 7.8 %         BASOPHILS                   0                 0.0 - 2.0 %         IMMATURE GRANULOCYTES       0                 0.0 - 5.0 %         ABS. NEUTROPHILS            3.4               1.7 - 8.2 K/*       ABS. LYMPHOCYTES            1.1               0.5 - 4.6 K/*       ABS. MONOCYTES              0.7               0.1 - 1.3 K/*       ABS. EOSINOPHILS            0.2               0.0 - 0.8 K/*       ABS. BASOPHILS              0.0               0.0 - 0.2 K/*       ABS. IMM. GRANS.            0.0               0.0 - 0.5 K/*  -METABOLIC PANEL, COMPREHENSIVE  Collection Time: 09/24/21  2:23 AM       Result                      Value             Ref Range           Sodium                      138               136 - 145 mm*       Potassium                   3.7               3.5 - 5.1 mm*       Chloride                    103               98 - 107 mmo*       CO2                         27                21 - 32 mmol*       Anion gap                   8                 7 - 16 mmol/L       Glucose                     257 (H)           65 - 100 mg/*       BUN                         18                8 - 23 MG/DL        Creatinine                  1.01              0.8 - 1.5 MG*       GFR est AA                  >60               >60 ml/min/1*       GFR est non-AA              >60               >60 ml/min/1*       Calcium                     8.9               8.3 - 10.4 M*       Bilirubin, total            0.4               0.2 - 1.1 MG*       ALT (SGPT)                  30                12 - 65 U/L         AST (SGOT)                  29                15 - 37 U/L         Alk.  phosphatase            106               50 - 136 U/L        Protein, total              7.4               6.3 - 8.2 g/*       Albumin                     3.7               3.2 - 4.6 g/*       Globulin                    3.7 (H)           2.3 - 3.5 g/*       A-G Ratio                   1.0 (L)           1.2 - 3.5      -LIPASE  Collection Time: 09/24/21  2:23 AM       Result                      Value             Ref Range           Lipase                      115               73 - 393 U/L   -MAGNESIUM  Collection Time: 09/24/21  2:23 AM       Result                      Value             Ref Range           Magnesium                   1.9               1.8 - 2.4 mg*  -NT-PRO BNP  Collection Time: 09/24/21  2:23 AM       Result                      Value             Ref Range           NT pro-BNP                  526 (H)           5 - 125 PG/ML  )  Tests in the radiology section of CPT®: ordered and reviewed (XR CHEST PORT    (Results Pending)  )  Decide to obtain previous medical records or to obtain history from someone other than the patient: yes  Discuss the patient with other providers: yes (Cardiology)    Risk of Complications, Morbidity, and/or Mortality  Presenting problems: moderate  Diagnostic procedures: low  Management options: low  General comments: CRITICAL CARE Documentation: This patient is critically ill and there is a high probability of of imminent or life threatening deterioration in the patient's condition without immediate management. The nature of the patient's clinical problem is: Acute non-Q wave MI with markedly elevated troponin, requiring IV heparin, Nitropaste, aspirin, and transfer to cardiology    I have spent 35 minutes in direct patient care, documentation, review of labs/xrays/old records, discussion with Staff, Colleague, Nursing . The time involved in the performance of separately reportable procedures was not counted toward critical care time.      Shannon Pina MD; 9/24/2021 @3:08 AM        Patient Progress  Patient progress: improved         EKG    Date/Time: 9/24/2021 3:09 AM  Performed by: Kitty Lee MD  Authorized by: Kitty Lee MD     Previous ECG:     Previous ECG:  Compared to current    Comparison ECG info:  2017    Similarity:  Changes noted  Interpretation:     Interpretation: abnormal    Rate:     ECG rate assessment: normal    Rhythm:     Rhythm: sinus rhythm    Ectopy:     Ectopy: none    QRS:     QRS axis:  Normal  Conduction:     Conduction: normal    ST segments:     ST segments:  Non-specific  T waves:     T waves: inverted      Inverted:  V5, V6, II, III, aVF and V4 57

## 2022-04-18 NOTE — ED PROVIDER NOTE - ATTENDING APP SHARED VISIT CONTRIBUTION OF CARE
I have personally performed a face to face medical and diagnostic evaluation of the patient. I have discussed with and reviewed the BROOKE's note and agree with the History, ROS, Physical Exam and MDM unless otherwise indicated. A brief summary of my personal evaluation and impression can be found below.    87F hx of HTN presents to ED at [facility] requests for eval of lumbar pain and likely admission for PT/rehab, of note pt was seen in ED recently for UTI, was dc'd on abx, per son and family at bedside they are concerned pt is not taking meds, she currently lives w a son, who is away, she "sits in the chair" all day, has issues with motility. Pt reports she is having back pain for "months" Family also notes increasing memory loss and confusion. Per pt Denies numbness, tingling or loss of sensation. Denies loss of motor function. No saddle anesthesia.     All other ROS negative, except as above and as per HPI and ROS section.    VITALS: Initial triage and subsequent vitals have been reviewed by me.  GEN APPEARANCE: WDWN, alert, non-toxic, chronically ill appearing   HEAD: Atraumatic.  EYES: PERRLa, EOMI, vision grossly intact.   NECK: Supple  CV: RRR, S1S2, no c/r/m/g. Cap refill <2 seconds. No bruits.   LUNGS: CTAB. No abnormal breath sounds.  ABDOMEN: Soft, NTND. No guarding or rebound.   MSK/EXT: No midline spinal or extremity point tenderness. mild R CVA ttp. Pelvis stable. No peripheral edema. +diffuse mild paraspinal/sacral ttp  NEURO: Alert, follows commands.  Speech normal. Sensation and motor normal x4 extremities. No saddle anesthesia.   SKIN: Warm, dry and intact. No rash.  PSYCH: Appropriate    Plan/MDM: 87F hx of HTN presents to ED at [facility] requests for eval of lumbar pain and likely admission for PT/rehab, of note pt was seen in ED recently for UTI, was dc'd on abx, per son and family at bedside they are concerned pt is not taking meds, she currently lives w a son, who is away, she "sits in the chair" all day, has issues with motility. Pt reports she is having back pain for "months" Family also notes increasing memory loss and confusion. exam vss non toxic PE as above ddx c/f possible lumbar fx vs poorly/under treated UTI, plan to check labs urine ct lumbar meds as needed, anticipate admission.

## 2022-04-18 NOTE — PATIENT PROFILE ADULT - FALL HARM RISK - HARM RISK INTERVENTIONS
Assistance with ambulation/Assistance OOB with selected safe patient handling equipment/Communicate Risk of Fall with Harm to all staff/Discuss with provider need for PT consult/Monitor gait and stability/Reinforce activity limits and safety measures with patient and family/Tailored Fall Risk Interventions/Visual Cue: Yellow wristband and red socks/Bed in lowest position, wheels locked, appropriate side rails in place/Call bell, personal items and telephone in reach/Instruct patient to call for assistance before getting out of bed or chair/Non-slip footwear when patient is out of bed/[place] to call system/Physically safe environment - no spills, clutter or unnecessary equipment/Purposeful Proactive Rounding/Room/bathroom lighting operational, light cord in reach

## 2022-04-18 NOTE — PATIENT PROFILE ADULT - NS PRO AD NO ADVANCE DIRECTIVE
PATIENT CALLING REQUESTING A CALL BACK FROM VIGNESH SO SHE CAN TALK TO HER ABOUT SHE IS EXPERIENCING VAGINAL OR BLEEDING FROM WHEN SHE URINATES. PATIENT STATES SHE IS 81 YEARS OLD AND IS CONCERNED AND REQUESTS A CALL BACK PLEASE. [phone]   No

## 2022-04-18 NOTE — H&P ADULT - NSHPPHYSICALEXAM_GEN_ALL_CORE
Vital Signs (24 Hrs):  T(C): 37.2 (04-18-22 @ 16:28), Max: 37.2 (04-18-22 @ 16:28)  HR: 90 (04-18-22 @ 16:28) (90 - 90)  BP: 173/98 (04-18-22 @ 16:28) (173/98 - 173/98)  RR: 16 (04-18-22 @ 16:28) (16 - 16)  SpO2: 93% (04-18-22 @ 16:28) (93% - 93%)  Daily Height in cm: 165.1 (18 Apr 2022 16:28)

## 2022-04-18 NOTE — ED PROVIDER NOTE - OBJECTIVE STATEMENT
88 yo female with a medical history of HTN, sent to ED by PMD Dr. Escobar for CT of lumbar spine, admission to hospital and Physical Therapy evaluation, resides with a son and family who is currently away, with complaints of Low Back pain that started "months" ago. Family present reports patient with worsening memory loss and confusion, chronic urinary incontinence. Patient seen in the ED on 8/10/2022, started on amoxicillin but has not been taking it. Denies any fevers, chills, SOB, CP, n/v/d, falls or injuries, denies any numbness, tingling, dysuria, hematuria or any other complaint.

## 2022-04-18 NOTE — ED PROVIDER NOTE - NS ED ATTENDING STATEMENT MOD
Statement Selected This was a shared visit with the BROOKE. I reviewed and verified the documentation and independently performed the documented:

## 2022-04-18 NOTE — ED PROVIDER NOTE - CLINICAL SUMMARY MEDICAL DECISION MAKING FREE TEXT BOX
86 yo female with a medical history of HTN, sent to ED by PMD Dr. Escobar for CT of lumbar spine, admission to hospital and Physical Therapy evaluation, resides with a son and family who is currently away, with complaints of Low Back pain that started "months" ago. Family present reports patient with worsening memory loss and confusion, chronic urinary incontinence. Patient seen in the ED on 8/10/2022, started on amoxicillin but has not been taking it. Denies any fevers, chills, SOB, CP, n/v/d, falls or injuries, denies any numbness, tingling, dysuria, hematuria or any other complaint. 86 yo female with a medical history of HTN, sent to ED by PMD Dr. Escobar for CT of lumbar spine, admission to hospital and Physical Therapy evaluation, resides with a son and family who is currently away, with complaints of Low Back pain that started "months" ago. Family present reports patient with worsening memory loss and confusion, chronic urinary incontinence. Patient seen in the ED on 8/10/2022, started on amoxicillin but has not been taking it. Denies any fevers, chills, SOB, CP, n/v/d, falls or injuries, denies any numbness, tingling, dysuria, hematuria or any other complaint.    Right paravertebral, right CVA, right posterior pelvis tenderness noted on examination.  Discussed admission with patient and son present in ED. Will order CBC, CMP, UA, UC, CXR, Lumbar and Pelvis bony CT, ECG, given non-compliance with abx will cover with Rocephin, tylenol for pain. 88 yo female with a medical history of HTN, sent to ED by PMD Dr. Escobar for CT of lumbar spine, admission to hospital and Physical Therapy evaluation, resides with a son and family who is currently away, with complaints of Low Back pain that started "months" ago. Family present reports patient with worsening memory loss and confusion, chronic urinary incontinence. Patient seen in the ED on 8/10/2022, started on amoxicillin but has not been taking it. Denies any fevers, chills, SOB, CP, n/v/d, falls or injuries, denies any numbness, tingling, dysuria, hematuria or any other complaint.    Right paravertebral, right CVA, right posterior pelvis tenderness noted on examination.  Discussed admission with patient and son present in ED. Will order CBC, CMP, UA, UC, CXR, Lumbar and Pelvis bony CT, ECG, given non-compliance with abx will cover with Rocephin, tylenol for pain.    d/w hospitalist, will admit once results in: pylo vs. chronic back pain dx. for admission.

## 2022-04-18 NOTE — H&P ADULT - HISTORY OF PRESENT ILLNESS
1.  Compression deformity of L1 vertebral body (approximately 40% height   	loss), possibly subacute to chronic in nature. Correlate with point   	tenderness in this region to evaluate for possibility of acute traumatic   	injury. Consider further evaluation with MRI if there is persistent   	clinical suspicion for acute traumatic injury.  	  	2.  Lumbar spondylosis, as detailed above but can be more optimally   	evaluated with MRI disc protocol as clinically indicated. 86 yo female with HTN (non compliant with meds), presents to the ED c/o worsened back pain.  Pt states she's had lower back pain for the past 3-4 months, states she fell few months ago, but never had medical eval at that time.  Pt states she has been taking Tylenol, Lidoderm patch, occ Motrin with not much relief.  Pt was in ED last week, c/o same, was treated for possible UTI, was prescribed Amoxicillin.    Today, pt states back pain worse, so called PMD, was advised to go to ED for further eval.  Pt denies any other recent trauma, denies fever, chills, chest pain, dyspnea, dysuria.  Pt was accompanied by daughter in law who states pt has urinary incontinence, also reports that pt with worsening memory loss and confusion.  Pt's son, who lives next door to pt, is currently out of town, so pt is at home by herself.   CT Lumbar spine reports compression deformity of L1 vertebral body (approximately 40% height loss), possibly subacute to chronic in nature.  Lumbar spondylosis.

## 2022-04-18 NOTE — ED PROVIDER NOTE - PROGRESS NOTE DETAILS
Spoke with Hospitalist Dr. Luna to make aware, test results pending. GERMÁN Hernandez: pt s/o to me. Lumbar CT results reviewed- L1 compression deformity.  UA results reviewed. Spoke with Hospitalist, will admit for PT Keaton WINSTON: (Back Charting) Pt signed out to my colleague Dr. Vega pending labs, imaging and clinical reassessment.

## 2022-04-18 NOTE — ED ADULT TRIAGE NOTE - CHIEF COMPLAINT QUOTE
She has back pain, she was here a week ago, Dr. Deal Sent us over. Currently on Tylenol and antibiotics.

## 2022-04-18 NOTE — ED PROVIDER NOTE - CPE EDP RESP NORM
Cimetidine Pregnancy And Lactation Text: This medication is Pregnancy Category B and is considered safe during pregnancy. It is also excreted in breast milk and breast feeding isn't recommended. normal...

## 2022-04-19 DIAGNOSIS — S32.000A WEDGE COMPRESSION FRACTURE OF UNSPECIFIED LUMBAR VERTEBRA, INITIAL ENCOUNTER FOR CLOSED FRACTURE: ICD-10-CM

## 2022-04-19 PROCEDURE — 99235 HOSP IP/OBS SAME DATE MOD 70: CPT

## 2022-04-19 RX ORDER — LOSARTAN POTASSIUM 100 MG/1
50 TABLET, FILM COATED ORAL DAILY
Refills: 0 | Status: DISCONTINUED | OUTPATIENT
Start: 2022-04-19 | End: 2022-04-20

## 2022-04-19 RX ORDER — LOSARTAN POTASSIUM 100 MG/1
50 TABLET, FILM COATED ORAL ONCE
Refills: 0 | Status: COMPLETED | OUTPATIENT
Start: 2022-04-19 | End: 2022-04-19

## 2022-04-19 RX ADMIN — LOSARTAN POTASSIUM 50 MILLIGRAM(S): 100 TABLET, FILM COATED ORAL at 05:02

## 2022-04-19 RX ADMIN — LIDOCAINE 1 PATCH: 4 CREAM TOPICAL at 08:35

## 2022-04-19 RX ADMIN — LOSARTAN POTASSIUM 50 MILLIGRAM(S): 100 TABLET, FILM COATED ORAL at 17:31

## 2022-04-19 RX ADMIN — ENOXAPARIN SODIUM 40 MILLIGRAM(S): 100 INJECTION SUBCUTANEOUS at 05:02

## 2022-04-19 RX ADMIN — Medication 400 MILLIGRAM(S): at 17:38

## 2022-04-19 RX ADMIN — LIDOCAINE 1 PATCH: 4 CREAM TOPICAL at 19:44

## 2022-04-19 RX ADMIN — Medication 400 MILLIGRAM(S): at 18:30

## 2022-04-19 RX ADMIN — LIDOCAINE 1 PATCH: 4 CREAM TOPICAL at 07:35

## 2022-04-19 NOTE — CHART NOTE - NSCHARTNOTEFT_GEN_A_CORE
SW placed call to patient to discuss and assist with follow up care.  Patient presented to ED on 4/10/22 with abdominal pain.  Pt did not answer. No answer and SW was unable to leave a voice message.

## 2022-04-19 NOTE — PROVIDER CONTACT NOTE (CHANGE IN STATUS NOTIFICATION) - RECOMMENDATIONS
STAT losartan 50 mg  ordered for high BP. After administration of losartan. Patient's BP normalized to 146/87 heart rate= 94.

## 2022-04-19 NOTE — CHART NOTE - NSCHARTNOTEFT_GEN_A_CORE
86 yo female with HTN (non compliant with meds), presents to the ED c/o worsened back pain.  Pt states she's had lower back pain for the past 3-4 months,  states she fell few months ago, but never had medical eval at that time.  Pt states she has been taking Tylenol, Lidoderm patch, occ Motrin with not much relief.   Pt was in ED last week, c/o same, was treated for possible UTI, was prescribed Amoxicillin.    Today, pt states back pain worse, so called PMD, was advised to go to ED for further eval.  Pt denies any other recent trauma, denies fever, chills, chest pain,  dyspnea, dysuria.  Pt was accompanied by daughter in law who states pt has urinary incontinence, also reports that pt with worsening memory loss and  confusion.  Pt's son, who lives next door to pt, is currently out of town, so pt is at home by herself.   CT Lumbar spine reports compression deformity of L1 vertebral body (approximately 40% height loss), possibly subacute to chronic in nature.  Lumbar spondylosis. No reports of trauma    Pt AOX3  Denies saddle anesthesia. Denies numbness or tingling to extremities. Denies loss of bowel or bladder continence.     #Back pain due to lumbar compression fracture/deformity  #Dementia  #Hypertension  #Hypokalemia, mild    Analgesics prn, muscle relaxant prn  Ortho consult  Repleted  Losartan  PT recommending home with home PT   Social work referral    Case discussed with Dr. Escobar and surgery PA 88 yo female with HTN (non compliant with meds), presents to the ED c/o worsened back pain.  Pt states she's had lower back pain for the past 3-4 months,  states she fell few months ago, but never had medical eval at that time.  Pt states she has been taking Tylenol, Lidoderm patch, occ Motrin with not much relief.   Pt was in ED last week, c/o same, was treated for possible UTI, was prescribed Amoxicillin.    Today, pt states back pain worse, so called PMD, was advised to go to ED for further eval.  Pt denies any other recent trauma, denies fever, chills, chest pain,  dyspnea, dysuria.  Pt was accompanied by daughter in law who states pt has urinary incontinence, also reports that pt with worsening memory loss and  confusion.  Pt's son, who lives next door to pt, is currently out of town, so pt is at home by herself.   CT Lumbar spine reports compression deformity of L1 vertebral body (approximately 40% height loss), possibly subacute to chronic in nature.  Lumbar spondylosis. No reports of trauma    Pt AOX3  Denies saddle anesthesia. Denies numbness or tingling to extremities. Denies loss of bowel or bladder continence.     #Back pain due to lumbar compression fracture/deformity  #Dementia  #Hypertension  #Hypokalemia, mild    Analgesics prn, muscle relaxant prn  Ortho consult  Repleted  Losartan  PT recommending home with home PT   Social work referral    Case discussed with Dr. Escobar and surgery PA    Dispo  Home w PT vs [facility]

## 2022-04-19 NOTE — PHYSICAL THERAPY INITIAL EVALUATION ADULT - ADDITIONAL COMMENTS
pt reports 6 SULEIMAN home with 1 railing. son lives next door to assist along with daughter in law. pt states she does not use assistive device. first floor set up

## 2022-04-19 NOTE — PROVIDER CONTACT NOTE (CHANGE IN STATUS NOTIFICATION) - SITUATION
Patient BP reading was 175/108, heart rate= 94 electronically. Patient did not complain of SOB or Chest Pain. Patient is stable.

## 2022-04-19 NOTE — PHYSICAL THERAPY INITIAL EVALUATION ADULT - PERTINENT HX OF CURRENT PROBLEM, REHAB EVAL
pt with progressive low back pain over 6 month period since mechanical fall. pt denies radiculopathy.

## 2022-04-20 ENCOUNTER — TRANSCRIPTION ENCOUNTER (OUTPATIENT)
Age: 87
End: 2022-04-20

## 2022-04-20 VITALS
RESPIRATION RATE: 15 BRPM | TEMPERATURE: 99 F | DIASTOLIC BLOOD PRESSURE: 94 MMHG | OXYGEN SATURATION: 95 % | HEART RATE: 90 BPM | SYSTOLIC BLOOD PRESSURE: 144 MMHG

## 2022-04-20 LAB
ANION GAP SERPL CALC-SCNC: 10 MMOL/L — SIGNIFICANT CHANGE UP (ref 5–17)
BUN SERPL-MCNC: 27 MG/DL — HIGH (ref 7–23)
CALCIUM SERPL-MCNC: 8.7 MG/DL — SIGNIFICANT CHANGE UP (ref 8.4–10.5)
CHLORIDE SERPL-SCNC: 109 MMOL/L — HIGH (ref 96–108)
CO2 SERPL-SCNC: 24 MMOL/L — SIGNIFICANT CHANGE UP (ref 22–31)
CREAT SERPL-MCNC: 0.92 MG/DL — SIGNIFICANT CHANGE UP (ref 0.5–1.3)
CULTURE RESULTS: SIGNIFICANT CHANGE UP
EGFR: 60 ML/MIN/1.73M2 — SIGNIFICANT CHANGE UP
GLUCOSE SERPL-MCNC: 110 MG/DL — HIGH (ref 70–99)
HCT VFR BLD CALC: 44.1 % — SIGNIFICANT CHANGE UP (ref 34.5–45)
HGB BLD-MCNC: 14.4 G/DL — SIGNIFICANT CHANGE UP (ref 11.5–15.5)
MCHC RBC-ENTMCNC: 30.6 PG — SIGNIFICANT CHANGE UP (ref 27–34)
MCHC RBC-ENTMCNC: 32.7 GM/DL — SIGNIFICANT CHANGE UP (ref 32–36)
MCV RBC AUTO: 93.6 FL — SIGNIFICANT CHANGE UP (ref 80–100)
NRBC # BLD: 0 /100 WBCS — SIGNIFICANT CHANGE UP (ref 0–0)
PLATELET # BLD AUTO: 171 K/UL — SIGNIFICANT CHANGE UP (ref 150–400)
POTASSIUM SERPL-MCNC: 3.6 MMOL/L — SIGNIFICANT CHANGE UP (ref 3.5–5.3)
POTASSIUM SERPL-SCNC: 3.6 MMOL/L — SIGNIFICANT CHANGE UP (ref 3.5–5.3)
RBC # BLD: 4.71 M/UL — SIGNIFICANT CHANGE UP (ref 3.8–5.2)
RBC # FLD: 13.6 % — SIGNIFICANT CHANGE UP (ref 10.3–14.5)
SODIUM SERPL-SCNC: 143 MMOL/L — SIGNIFICANT CHANGE UP (ref 135–145)
SPECIMEN SOURCE: SIGNIFICANT CHANGE UP
WBC # BLD: 5.02 K/UL — SIGNIFICANT CHANGE UP (ref 3.8–10.5)
WBC # FLD AUTO: 5.02 K/UL — SIGNIFICANT CHANGE UP (ref 3.8–10.5)

## 2022-04-20 PROCEDURE — 99239 HOSP IP/OBS DSCHRG MGMT >30: CPT

## 2022-04-20 PROCEDURE — 97161 PT EVAL LOW COMPLEX 20 MIN: CPT

## 2022-04-20 PROCEDURE — 97110 THERAPEUTIC EXERCISES: CPT

## 2022-04-20 PROCEDURE — 80048 BASIC METABOLIC PNL TOTAL CA: CPT

## 2022-04-20 PROCEDURE — 36415 COLL VENOUS BLD VENIPUNCTURE: CPT

## 2022-04-20 PROCEDURE — 81001 URINALYSIS AUTO W/SCOPE: CPT

## 2022-04-20 PROCEDURE — 87635 SARS-COV-2 COVID-19 AMP PRB: CPT

## 2022-04-20 PROCEDURE — 85027 COMPLETE CBC AUTOMATED: CPT

## 2022-04-20 PROCEDURE — 93005 ELECTROCARDIOGRAM TRACING: CPT

## 2022-04-20 PROCEDURE — 85025 COMPLETE CBC W/AUTO DIFF WBC: CPT

## 2022-04-20 PROCEDURE — 71045 X-RAY EXAM CHEST 1 VIEW: CPT

## 2022-04-20 PROCEDURE — 99285 EMERGENCY DEPT VISIT HI MDM: CPT | Mod: 25

## 2022-04-20 PROCEDURE — 72192 CT PELVIS W/O DYE: CPT | Mod: MA

## 2022-04-20 PROCEDURE — 72131 CT LUMBAR SPINE W/O DYE: CPT | Mod: MA

## 2022-04-20 PROCEDURE — 96365 THER/PROPH/DIAG IV INF INIT: CPT

## 2022-04-20 PROCEDURE — 87086 URINE CULTURE/COLONY COUNT: CPT

## 2022-04-20 PROCEDURE — 80053 COMPREHEN METABOLIC PANEL: CPT

## 2022-04-20 RX ORDER — ASPIRIN/CALCIUM CARB/MAGNESIUM 324 MG
1 TABLET ORAL
Qty: 60 | Refills: 0
Start: 2022-04-20 | End: 2022-05-19

## 2022-04-20 RX ORDER — ASPIRIN/CALCIUM CARB/MAGNESIUM 324 MG
1 TABLET ORAL
Qty: 30 | Refills: 0
Start: 2022-04-20 | End: 2022-05-19

## 2022-04-20 RX ORDER — LOSARTAN POTASSIUM 100 MG/1
1 TABLET, FILM COATED ORAL
Qty: 30 | Refills: 0
Start: 2022-04-20 | End: 2022-05-19

## 2022-04-20 RX ADMIN — ENOXAPARIN SODIUM 40 MILLIGRAM(S): 100 INJECTION SUBCUTANEOUS at 05:32

## 2022-04-20 RX ADMIN — LOSARTAN POTASSIUM 50 MILLIGRAM(S): 100 TABLET, FILM COATED ORAL at 05:32

## 2022-04-20 RX ADMIN — LIDOCAINE 1 PATCH: 4 CREAM TOPICAL at 07:29

## 2022-04-20 RX ADMIN — LIDOCAINE 1 PATCH: 4 CREAM TOPICAL at 07:28

## 2022-04-20 NOTE — PROGRESS NOTE ADULT - SUBJECTIVE AND OBJECTIVE BOX
Patient is a 87y old  Female who presents with a chief complaint of worsened back pain - L1 compression deformity (18 Apr 2022 19:43)      INTERVAL HPI/OVERNIGHT EVENTS:  severe back pain      REVIEW OF SYSTEMS:  CONSTITUTIONAL: No fever, weight loss, or fatigue  EYES: No eye pain, visual disturbances, or discharge  ENMT:  No difficulty hearing, tinnitus, vertigo; No sinus or throat pain  NECK: No pain or stiffness  BREASTS: No pain, masses, or nipple discharge  RESPIRATORY: No cough, wheezing, chills or hemoptysis; No shortness of breath  CARDIOVASCULAR: No chest pain, palpitations, dizziness, or leg swelling  GASTROINTESTINAL: No abdominal or epigastric pain. No nausea, vomiting, or hematemesis; No diarrhea or constipation. No melena or hematochezia.  GENITOURINARY: No dysuria, frequency, hematuria, or incontinence  NEUROLOGICAL: No headaches, memory loss, loss of strength, numbness, or tremors  SKIN: No itching, burning, rashes, or lesions   LYMPH NODES: No enlarged glands  ENDOCRINE: No heat or cold intolerance; No hair loss  MUSCULOSKELETAL: No joint pain or swelling; No muscle, back, or extremity pain  PSYCHIATRIC: No depression, anxiety, mood swings, or difficulty sleeping  HEME/LYMPH: No easy bruising, or bleeding gums  ALLERY AND IMMUNOLOGIC: No hives or eczema  FAMILY HISTORY:  No pertinent family history in first degree relatives      T(C): 37 (04-19-22 @ 05:59), Max: 37.2 (04-18-22 @ 16:28)  HR: 74 (04-19-22 @ 05:59) (72 - 90)  BP: 148/86 (04-19-22 @ 05:59) (137/81 - 173/98)  RR: 18 (04-19-22 @ 05:59) (15 - 18)  SpO2: 95% (04-19-22 @ 05:59) (93% - 95%)  Wt(kg): --Vital Signs Last 24 Hrs  T(C): 37 (19 Apr 2022 05:59), Max: 37.2 (18 Apr 2022 16:28)  T(F): 98.6 (19 Apr 2022 05:59), Max: 99 (18 Apr 2022 16:28)  HR: 74 (19 Apr 2022 05:59) (72 - 90)  BP: 148/86 (19 Apr 2022 05:59) (137/81 - 173/98)  BP(mean): --  RR: 18 (19 Apr 2022 05:59) (15 - 18)  SpO2: 95% (19 Apr 2022 05:59) (93% - 95%)    T(F): 98.6 (04-19-22 @ 05:59), Max: 99 (04-18-22 @ 16:28)  HR: 74 (04-19-22 @ 05:59) (72 - 90)  BP: 148/86 (04-19-22 @ 05:59) (137/81 - 173/98)  RR: 18 (04-19-22 @ 05:59) (15 - 18)  SpO2: 95% (04-19-22 @ 05:59) (93% - 95%)    PHYSICAL EXAM:  GENERAL: NAD, well-groomed, well-developed  HEAD:  Atraumatic, Normocephalic  EYES: EOMI, PERRLA, conjunctiva and sclera clear  ENMT: No tonsillar erythema, exudates, or enlargement; Moist mucous membranes, Good dentition, No lesions  NECK: Supple, No JVD, Normal thyroid  NERVOUS SYSTEM:  Alert & Oriented X3, Good concentration; Motor Strength 5/5 B/L upper and lower extremities; DTRs 2+ intact and symmetric  CHEST/LUNG: Clear to percussion bilaterally; No rales, rhonchi, wheezing, or rubs  HEART: Regular rate and rhythm; No murmurs, rubs, or gallops  ABDOMEN: Soft, Nontender, Nondistended; Bowel sounds present  EXTREMITIES:  2+ Peripheral Pulses, No clubbing, cyanosis, or edema  LYMPH: No lymphadenopathy noted  SKIN: No rashes or lesions    Consultant(s) Notes Reviewed:  [x ] YES  [ ] NO  Care Discussed with Consultants/Other Providers [ x] YES  [ ] NO    LABS:  04-18    143  |  105  |  21  ----------------------------<  126<H>  3.4<L>   |  28  |  0.98    Ca    8.8      18 Apr 2022 17:45    TPro  7.0  /  Alb  3.2<L>  /  TBili  0.3  /  DBili  x   /  AST  21  /  ALT  31  /  AlkPhos  75  04-18                          14.2   7.09  )-----------( 213      ( 18 Apr 2022 17:45 )             43.2           LIVER FUNCTIONS - ( 18 Apr 2022 17:45 )  Alb: 3.2 g/dL / Pro: 7.0 g/dL / ALK PHOS: 75 U/L / ALT: 31 U/L / AST: 21 U/L / GGT: x                            14.2   7.09  )-----------( 213      ( 04-18 @ 17:45 )             43.2                13.9   5.17  )-----------( 177      ( 04-10 @ 07:50 )             42.9               RADIOLOGY & ADDITIONAL TESTS:    Imaging Personally Reviewed:  [ ] YES  [ ] NO  acetaminophen     Tablet .. 650 milliGRAM(s) Oral every 6 hours PRN  cyclobenzaprine 5 milliGRAM(s) Oral four times a day PRN  enoxaparin Injectable 40 milliGRAM(s) SubCutaneous every 24 hours  ibuprofen  Tablet. 400 milliGRAM(s) Oral three times a day PRN  lidocaine   4% Patch 1 Patch Transdermal every 24 hours  losartan 50 milliGRAM(s) Oral daily  oxycodone    5 mG/acetaminophen 325 mG 1 Tablet(s) Oral every 4 hours PRN      HEALTH ISSUES - PROBLEM Dx:        
Patient is a 87y old  Female who presents with a chief complaint of worsened back pain - L1 compression deformity (19 Apr 2022 07:43)      INTERVAL HPI/OVERNIGHT EVENTS:  pain diminished      REVIEW OF SYSTEMS:  CONSTITUTIONAL: No fever, weight loss, or fatigue  EYES: No eye pain, visual disturbances, or discharge  ENMT:  No difficulty hearing, tinnitus, vertigo; No sinus or throat pain  NECK: No pain or stiffness  BREASTS: No pain, masses, or nipple discharge  RESPIRATORY: No cough, wheezing, chills or hemoptysis; No shortness of breath  CARDIOVASCULAR: No chest pain, palpitations, dizziness, or leg swelling  GASTROINTESTINAL: No abdominal or epigastric pain. No nausea, vomiting, or hematemesis; No diarrhea or constipation. No melena or hematochezia.  GENITOURINARY: No dysuria, frequency, hematuria, or incontinence  NEUROLOGICAL: No headaches, memory loss, loss of strength, numbness, or tremors  SKIN: No itching, burning, rashes, or lesions   LYMPH NODES: No enlarged glands  ENDOCRINE: No heat or cold intolerance; No hair loss  MUSCULOSKELETAL: No joint pain or swelling; No muscle, back, or extremity pain  PSYCHIATRIC: No depression, anxiety, mood swings, or difficulty sleeping  HEME/LYMPH: No easy bruising, or bleeding gums  ALLERY AND IMMUNOLOGIC: No hives or eczema  FAMILY HISTORY:  No pertinent family history in first degree relatives      T(C): 37 (04-20-22 @ 05:49), Max: 37 (04-20-22 @ 05:49)  HR: 93 (04-20-22 @ 09:09) (76 - 98)  BP: 126/75 (04-20-22 @ 09:09) (126/75 - 175/108)  RR: 20 (04-20-22 @ 05:49) (16 - 20)  SpO2: 95% (04-20-22 @ 05:49) (95% - 98%)  Wt(kg): --Vital Signs Last 24 Hrs  T(C): 37 (20 Apr 2022 05:49), Max: 37 (20 Apr 2022 05:49)  T(F): 98.6 (20 Apr 2022 05:49), Max: 98.6 (20 Apr 2022 05:49)  HR: 93 (20 Apr 2022 09:09) (76 - 98)  BP: 126/75 (20 Apr 2022 09:09) (126/75 - 175/108)  BP(mean): --  RR: 20 (20 Apr 2022 05:49) (16 - 20)  SpO2: 95% (20 Apr 2022 05:49) (95% - 98%)    T(F): 98.6 (04-20-22 @ 05:49), Max: 99 (04-18-22 @ 16:28)  HR: 93 (04-20-22 @ 09:09) (72 - 98)  BP: 126/75 (04-20-22 @ 09:09) (126/75 - 175/108)  RR: 20 (04-20-22 @ 05:49) (15 - 20)  SpO2: 95% (04-20-22 @ 05:49) (93% - 98%)    PHYSICAL EXAM:  GENERAL: NAD, well-groomed, well-developed  HEAD:  Atraumatic, Normocephalic  EYES: EOMI, PERRLA, conjunctiva and sclera clear  ENMT: No tonsillar erythema, exudates, or enlargement; Moist mucous membranes, Good dentition, No lesions  NECK: Supple, No JVD, Normal thyroid  NERVOUS SYSTEM:  Alert & Oriented X3, Good concentration; Motor Strength 5/5 B/L upper and lower extremities; DTRs 2+ intact and symmetric  CHEST/LUNG: Clear to percussion bilaterally; No rales, rhonchi, wheezing, or rubs  HEART: Regular rate and rhythm; No murmurs, rubs, or gallops  ABDOMEN: Soft, Nontender, Nondistended; Bowel sounds present  EXTREMITIES:  2+ Peripheral Pulses, No clubbing, cyanosis, or edema  LYMPH: No lymphadenopathy noted  SKIN: No rashes or lesions    Consultant(s) Notes Reviewed:  [x ] YES  [ ] NO  Care Discussed with Consultants/Other Providers [ x] YES  [ ] NO    LABS:  04-20    143  |  109<H>  |  27<H>  ----------------------------<  110<H>  3.6   |  24  |  0.92    Ca    8.7      20 Apr 2022 05:45    TPro  7.0  /  Alb  3.2<L>  /  TBili  0.3  /  DBili  x   /  AST  21  /  ALT  31  /  AlkPhos  75  04-18                          14.4   5.02  )-----------( 171      ( 20 Apr 2022 05:45 )             44.1       Culture - Urine (collected 04-18-22 @ 19:15)  Source: Clean Catch Clean Catch (Midstream)  Final Report (04-20-22 @ 08:07):    <10,000 CFU/mL Normal Urogenital Deborah          LIVER FUNCTIONS - ( 18 Apr 2022 17:45 )  Alb: 3.2 g/dL / Pro: 7.0 g/dL / ALK PHOS: 75 U/L / ALT: 31 U/L / AST: 21 U/L / GGT: x                            14.4   5.02  )-----------( 171      ( 04-20 @ 05:45 )             44.1                14.2   7.09  )-----------( 213      ( 04-18 @ 17:45 )             43.2                13.9   5.17  )-----------( 177      ( 04-10 @ 07:50 )             42.9               RADIOLOGY & ADDITIONAL TESTS:    Imaging Personally Reviewed:  [ ] YES  [ ] NO  acetaminophen     Tablet .. 650 milliGRAM(s) Oral every 6 hours PRN  cyclobenzaprine 5 milliGRAM(s) Oral four times a day PRN  enoxaparin Injectable 40 milliGRAM(s) SubCutaneous every 24 hours  ibuprofen  Tablet. 400 milliGRAM(s) Oral three times a day PRN  lidocaine   4% Patch 1 Patch Transdermal every 24 hours  losartan 50 milliGRAM(s) Oral daily  oxycodone    5 mG/acetaminophen 325 mG 1 Tablet(s) Oral every 4 hours PRN      HEALTH ISSUES - PROBLEM Dx:  Compression fracture of lumbar vertebra

## 2022-04-20 NOTE — DISCHARGE NOTE NURSING/CASE MANAGEMENT/SOCIAL WORK - NSDCPEFALRISK_GEN_ALL_CORE
For information on Fall & Injury Prevention, visit: [email or web address] OR  [email or web address] OR  https://www.cdc.gov/steadi/patient.html

## 2022-04-20 NOTE — DISCHARGE NOTE PROVIDER - NSDCCPCAREPLAN_GEN_ALL_CORE_FT
PRINCIPAL DISCHARGE DIAGNOSIS  Diagnosis: Compression deformity of vertebra  Assessment and Plan of Treatment: you were admitted to the hospital with back pain found with a lumbar vertebral fracture.  an orthopedist evaluated you.  management is not surgical, weight bearing as tolerated.  recommend outpatient follow up. no brace indicated.

## 2022-04-20 NOTE — DISCHARGE NOTE PROVIDER - NSDCMRMEDTOKEN_GEN_ALL_CORE_FT
aspirin 81 mg oral delayed release tablet: 1 tab(s) orally 2 times a day   Cozaar 50 mg oral tablet: 1 tab(s) orally once a day  ibuprofen 600 mg oral tablet: 1 tab(s) orally every 6 hours, As Needed -for moderate pain   Lidoderm 5% topical film: Apply topically to affected area once a day, As Needed -for severe pain   methocarbamol 750 mg oral tablet: 1 tab(s) orally every 8 hours, As Needed -for muscle spasm   Tylenol 325 mg oral tablet: 2 tab(s) orally every 4 hours, As Needed -for moderate pain

## 2022-04-20 NOTE — CONSULT NOTE ADULT - SUBJECTIVE AND OBJECTIVE BOX
HPI:  88 yo female with HTN (non compliant with meds), presents to the ED c/o worsened back pain.  Pt states she's had lower back pain for the past 3-4 months, states she fell few months ago, but never had medical eval at that time.  Pt states she has been taking Tylenol, Lidoderm patch, occ Motrin with not much relief.  Pt was in ED last week, c/o same, was treated for possible UTI, was prescribed Amoxicillin.    Today, pt states back pain worse, so called PMD, was advised to go to ED for further eval.  Pt denies any other recent trauma, denies fever, chills, chest pain, dyspnea, dysuria.  Pt was accompanied by daughter in law who states pt has urinary incontinence, also reports that pt with worsening memory loss and confusion.  Pt's son, who lives next door to pt, is currently out of town, so pt is at home by herself.   CT Lumbar spine reports compression deformity of L1 vertebral body (approximately 40% height loss), possibly subacute to chronic in nature.  Lumbar spondylosis.  (18 Apr 2022 19:43)    Patient was seen and examined by me today.   Patient is resting comfortably in bed.  She denies any back discomfort unless ambulating for a long period of time.   She denies any neuropathy , urine incontinence or loss of stool sphincter control. She denies any chest pain or SOB,  She is able to ambulate with walker.     Vital Signs Last 24 Hrs  T(C): 37 (20 Apr 2022 05:49), Max: 37 (20 Apr 2022 05:49)  T(F): 98.6 (20 Apr 2022 05:49), Max: 98.6 (20 Apr 2022 05:49)  HR: 93 (20 Apr 2022 09:09) (76 - 98)  BP: 126/75 (20 Apr 2022 09:09) (126/75 - 175/108)  RR: 20 (20 Apr 2022 05:49) (16 - 20)  SpO2: 95% (20 Apr 2022 05:49) (95% - 98%)    PAST MEDICAL & SURGICAL HISTORY:  HTN (hypertension)  No significant past surgical history    MEDICATIONS  (STANDING):  enoxaparin Injectable 40 milliGRAM(s) SubCutaneous every 24 hours  lidocaine   4% Patch 1 Patch Transdermal every 24 hours  losartan 50 milliGRAM(s) Oral daily      PE:  Alert and oriented X 3  Lungs:  CTA   Cor:  S1 S2   Abd:  soft, non tender , recent UTI , denies any urinary symptoms   Back :  no kyphosis or scoliosis noted.   Left and right leg with good active ROM, + sensation , denies neuropathy, + pulses bilateral       < from: CT Lumbar Spine No Cont (04.18.22 @ 18:19) >    IMPRESSION:    1.  Compression deformity of L1 vertebral body (approximately 40% height   loss), possibly subacute to chronic in nature. Correlate with point   tenderness in this region to evaluate for possibility of acute traumatic   injury. Consider further evaluation with MRI if there is persistent   clinical suspicion for acute traumatic injury.    2.  Lumbar spondylosis, as detailed above but can be more optimally   evaluated with MRI disc protocol as clinically indicated.    --- End of Report ---      < end of copied text >

## 2022-04-20 NOTE — DISCHARGE NOTE NURSING/CASE MANAGEMENT/SOCIAL WORK - PATIENT PORTAL LINK FT
You can access the FollowMyHealth Patient Portal offered by [facility] by registering at the following website: [email or web address]. By joining [facility]’s FollowMyHealth portal, you will also be able to view your health information using other applications (apps) compatible with our system.

## 2022-04-20 NOTE — CONSULT NOTE ADULT - ASSESSMENT
88 yo female with HTN (non compliant with meds), presents to the ED c/o worsened back pain.  Pt states she's had lower back pain for the past 3-4 months, states she fell few months ago, but never had medical eval at that time.  Pt states she has been taking Tylenol, Lidoderm patch, occ Motrin with not much relief.  Pt was in ED last week, c/o same, was treated for possible UTI, was prescribed Amoxicillin.        Discussed PE and CT with Dr Herbert.     PLan;  PT WBAT with walker and modalities for back pain             Ambulate as tolerated             DVT prophylaxis for 6 weeks if not ambulating             No back brace necessary -  Would be too cumbersome              Pain management with lidoderm patch , tramadol , motrin, tylenol.              Motrin sparingly while taking ASA for DVT prophylaxis               If no improvement , follow up with spine surgeon Dr Massey or get MRI for further               evaluation,.

## 2022-04-20 NOTE — DISCHARGE NOTE PROVIDER - CARE PROVIDER_API CALL
Chago Deal  GASTROENTEROLOGY  [address] [place] [address], [address]  [place], [place] [id number]  Phone: [phone]  Fax: [phone]  Follow Up Time:     Tad Massey)  Orthopaedic Surgery  [address] [place] [address]  Phone: [phone]  Fax: [phone]  Follow Up Time:

## 2022-04-20 NOTE — DISCHARGE NOTE PROVIDER - NSDCHHENCOUNTER_GEN_ALL_CORE
20-Apr-2022
The scribe's documentation has been prepared under my direction and personally reviewed by me in its entirety. I confirm that the note above accurately reflects all work, treatment, procedures, and medical decision making performed by me.

## 2022-04-20 NOTE — CHART NOTE - NSCHARTNOTEFT_GEN_A_CORE
Off Service NP Hospitalist Note  Case discussed with Dr. Escobar    labs and imaging reviewed    88 yo female with HTN (non compliant with meds), presents to the ED c/o worsened back pain.  Pt states she's had lower back pain for the past 3-4 months,  states she fell few months ago, but never had medical eval at that time.  Pt states she has been taking Tylenol, Lidoderm patch, occ Motrin with not much relief.   Pt was in ED last week, c/o same, was treated for possible UTI, was prescribed Amoxicillin.    CT Lumbar spine reports compression deformity of L1 vertebral body (approximately 40% height loss), possibly subacute to chronic in nature.  Lumbar spondylosis. No reports of trauma      #Back pain due to lumbar compression fracture/deformity  #Dementia  #Hypertension  #Hypokalemia, mild    Analgesics prn, muscle relaxant prn  Ortho consult - spoke with Tiffany today  continue losartan  spoke with family who would like to take pt home with services  Social work referral  supportive care    Disposition  home with PT after ortho evaluation with recommendations

## 2022-04-20 NOTE — DISCHARGE NOTE PROVIDER - CARE PROVIDERS DIRECT ADDRESSES
[email or web address]

## 2022-04-20 NOTE — PROGRESS NOTE ADULT - ASSESSMENT
await ortho consultation and PT reevaluation
CT scan lumbar spine  .L1 compression fracture at level of his pain....pain management ....orthopedic consultation to evaluate ....PT

## 2022-04-20 NOTE — DISCHARGE NOTE PROVIDER - HOSPITAL COURSE
Hospital Course  88 yo female with HTN (non compliant with meds), presents to the ED c/o worsened back pain.  Pt states she's had lower back pain for the past 3-4 months, states she fell few months ago, but never had medical eval at that time.  Pt states she has been taking Tylenol, Lidoderm patch, occ Motrin with not much relief.  Pt was in ED last week, c/o same, was treated for possible UTI, was prescribed Amoxicillin.    Today, pt states back pain worse, so called PMD, was advised to go to ED for further eval.  Pt denies any other recent trauma, denies fever, chills, chest pain, dyspnea, dysuria.  Pt was accompanied by daughter in law who states pt has urinary incontinence, also reports that pt with worsening memory loss and confusion.  Pt's son, who lives next door to pt, is currently out of town, so pt is at home by herself.   CT Lumbar spine reports compression deformity of L1 vertebral body (approximately 40% height loss), possibly subacute to chronic in nature.  Lumbar spondylosis.         Palliative Care / Advanced Care Planning  Code Status: Full Code  Patient/Family agreeable to Hospice/Palliative (Y/N)?  Summary of Goals of Care Conversation:    Discharging Provider:  Andrea Santiago NP  Contact Info: Cell [phone] - Please call with any questions or concerns.    Outpatient Provider: Dr. Escobar - aware           Hospital Course  88 yo female with HTN (non compliant with meds), presents to the ED c/o worsened back pain.  Pt states she's had lower back pain for the past 3-4 months, states she fell few months ago, but never had medical eval at that time.  Pt states she has been taking Tylenol, Lidoderm patch, occ Motrin with not much relief.  Pt was in ED last week, c/o same, was treated for possible UTI, was prescribed Amoxicillin.    Today, pt states back pain worse, so called PMD, was advised to go to ED for further eval.  Pt denies any other recent trauma, denies fever, chills, chest pain, dyspnea, dysuria.  Pt was accompanied by daughter in law who states pt has urinary incontinence, also reports that pt with worsening memory loss and confusion.  Pt's son, who lives next door to pt, is currently out of town, so pt is at home by herself.   CT Lumbar spine reports compression deformity of L1 vertebral body (approximately 40% height loss), possibly subacute to chronic in nature.  Lumbar spondylosis.     pt admitted to the hospital, imaging showed a compression deformity of L1 vertebral body (approximately 40% height loss), possible subacute to chronic in nature. lumbar spondylosis. Ortho evaluation obtained.  Recommend physical therapy with WBAT with walker and modalities for back pain.  Ambulate as tolerated, DVT prophylaxis for 6 weeks if not ambulating, no back brace necessary.  Pain management with lidoderm patch, tramadol, motrin and tylenol.  Use motrin sparingly while taking ASA for DVT prophylaxis.  If no improvement, follow up with spine surgeon Dr. Massey or get MRI for further evaluation.  Physical therapy recommends home with PT.  Medically cleared for outpatient follow up.         Palliative Care / Advanced Care Planning  Code Status: Full Code  Patient/Family agreeable to Hospice/Palliative (Y/N)?  Summary of Goals of Care Conversation:    Discharging Provider:  Andrea Sanitago NP  Contact Info: Cell [phone] - Please call with any questions or concerns.    Outpatient Provider: Dr. Escobar - aware

## 2022-04-23 NOTE — PHYSICAL EXAM
[No Acute Distress] : no acute distress [Well Nourished] : well nourished [Well Developed] : well developed [Well-Appearing] : well-appearing [Normal Sclera/Conjunctiva] : normal sclera/conjunctiva [PERRL] : pupils equal round and reactive to light [EOMI] : extraocular movements intact [Normal Outer Ear/Nose] : the outer ears and nose were normal in appearance [Normal Oropharynx] : the oropharynx was normal [Normal TMs] : both tympanic membranes were normal [Normal Nasal Mucosa] : the nasal mucosa was normal [No JVD] : no jugular venous distention [No Lymphadenopathy] : no lymphadenopathy [Supple] : supple [Thyroid Normal, No Nodules] : the thyroid was normal and there were no nodules present [No Respiratory Distress] : no respiratory distress  [No Accessory Muscle Use] : no accessory muscle use [Clear to Auscultation] : lungs were clear to auscultation bilaterally [Normal Rate] : normal rate  [Regular Rhythm] : with a regular rhythm [Normal S1, S2] : normal S1 and S2 [No Murmur] : no murmur heard [No Carotid Bruits] : no carotid bruits [No Abdominal Bruit] : a ~M bruit was not heard ~T in the abdomen [No Varicosities] : no varicosities [Pedal Pulses Present] : the pedal pulses are present [No Edema] : there was no peripheral edema [No Palpable Aorta] : no palpable aorta [No Extremity Clubbing/Cyanosis] : no extremity clubbing/cyanosis [Declined Breast Exam] : declined breast exam  [Soft] : abdomen soft [Non Tender] : non-tender [Non-distended] : non-distended [No Masses] : no abdominal mass palpated [No HSM] : no HSM [Normal Bowel Sounds] : normal bowel sounds [No Hernias] : no hernias [Declined Rectal Exam] : declined rectal exam [Normal Supraclavicular Nodes] : no supraclavicular lymphadenopathy [Normal Axillary Nodes] : no axillary lymphadenopathy [Normal Posterior Cervical Nodes] : no posterior cervical lymphadenopathy [Normal Anterior Cervical Nodes] : no anterior cervical lymphadenopathy [Normal Inguinal Nodes] : no inguinal lymphadenopathy [Normal Femoral Nodes] : no femoral lymphadenopathy [No CVA Tenderness] : no CVA  tenderness [No Spinal Tenderness] : no spinal tenderness [No Joint Swelling] : no joint swelling [Grossly Normal Strength/Tone] : grossly normal strength/tone [No Rash] : no rash [No Skin Lesions] : no skin lesions [Coordination Grossly Intact] : coordination grossly intact [No Focal Deficits] : no focal deficits [Normal Gait] : normal gait [Deep Tendon Reflexes (DTR)] : deep tendon reflexes were 2+ and symmetric [Speech Grossly Normal] : speech grossly normal [Memory Grossly Normal] : memory grossly normal [Normal Affect] : the affect was normal [Alert and Oriented x3] : oriented to person, place, and time [Normal Mood] : the mood was normal [Normal Insight/Judgement] : insight and judgment were intact [Kyphosis] : no kyphosis [Scoliosis] : no scoliosis [Acne] : no acne

## 2022-04-23 NOTE — HEALTH RISK ASSESSMENT
[Never] : Never [Yes] : Yes [No falls in past year] : Patient reported no falls in the past year [0] : 2) Feeling down, depressed, or hopeless: Not at all (0) [PHQ-2 Negative - No further assessment needed] : PHQ-2 Negative - No further assessment needed [de-identified] : socially [XED2Mblwh] : 0

## 2022-04-23 NOTE — HISTORY OF PRESENT ILLNESS
[FreeTextEntry1] : 87-year-old woman comes to the office for follow-up to review her medications and discuss her overall health patient with severe lower back pain [de-identified] : comes to the office for follow-up with a history of COVID-19 infection hypertension and fatigue patient has been acutely suffering severe lower back pain pain does not radiate down her legs and there is no associated numbness or weakness she denies temperature chills sweats or myalgias she has had no headache sinus congestion sore throat cough wheezing pleurisy chest pain shortness of breath exertional dyspnea lightheadedness palpitations dizziness vertigo or syncope denies abdominal pain nausea vomiting diarrhea constipation right red blood per rectum or black stools appetites been good weight has been stable does get up at night to urinate and urinates frequently but denies dysuria or gross hematuria patient is having 9 out of 10 severe lower back pain making it difficult for her to ambulate she has chronic anxiety but has been sleeping poorly recently due to her lower back pain

## 2022-04-23 NOTE — REVIEW OF SYSTEMS
[Fatigue] : fatigue [Nocturia] : nocturia [Joint Stiffness] : joint stiffness [Back Pain] : back pain [Anxiety] : anxiety [Negative] : Heme/Lymph [Frequency] : frequency [Fever] : no fever [Chills] : no chills [Hot Flashes] : no hot flashes [Night Sweats] : no night sweats [Recent Change In Weight] : ~T no recent weight change [Discharge] : no discharge [Pain] : no pain [Redness] : no redness [Dryness] : no dryness  [Vision Problems] : no vision problems [Itching] : no itching [Earache] : no earache [Hearing Loss] : no hearing loss [Nosebleed] : no nosebleeds [Hoarseness] : no hoarseness [Nasal Discharge] : no nasal discharge [Sore Throat] : no sore throat [Postnasal Drip] : no postnasal drip [Chest Pain] : no chest pain [Palpitations] : no palpitations [Leg Claudication] : no leg claudication [Lower Ext Edema] : no lower extremity edema [Orthopnea] : no orthopnea [Shortness Of Breath] : no shortness of breath [Wheezing] : no wheezing [Cough] : no cough [Dyspnea on Exertion] : no dyspnea on exertion [Abdominal Pain] : no abdominal pain [Nausea] : no nausea [Constipation] : no constipation [Diarrhea] : diarrhea [Vomiting] : no vomiting [Heartburn] : no heartburn [Melena] : no melena [Dysuria] : no dysuria [Incontinence] : no incontinence [Poor Libido] : libido not poor [Hematuria] : no hematuria [Vaginal Discharge] : no vaginal discharge [Dysmenorrhea] : no dysmenorrhea [Joint Pain] : no joint pain [Joint Swelling] : no joint swelling [Muscle Weakness] : no muscle weakness [Muscle Pain] : no muscle pain [Mole Changes] : no mole changes [Nail Changes] : no nail changes [Skin Rash] : no skin rash [Hair Changes] : no hair changes [Headache] : no headache [Dizziness] : no dizziness [Fainting] : no fainting [Confusion] : no confusion [Memory Loss] : no memory loss [Unsteady Walking] : no ataxia [Suicidal] : not suicidal [Insomnia] : no insomnia [Depression] : no depression [Easy Bleeding] : no easy bleeding [Easy Bruising] : no easy bruising [Swollen Glands] : no swollen glands [FreeTextEntry9] : severe 9/10

## 2022-04-23 NOTE — ASSESSMENT
[FreeTextEntry1] : Physical examination reveals a well-developed woman in severe discomfort due to lower back pain blood pressure 130/90 pulse 91 respiration 16 temperature 97.4 °F HEENT was unremarkable chest was clear cardiovascular exam was regular with no extra heart sounds or murmurs abdomen was soft extremities showed no edema neurologic exam and unable to stand unaided due to the severity of her back pain patient was sent to the emergency room for immediate evaluation compression fracture seems possible she denies falling or trauma patient blood pressure was slightly elevated due to her pain patient had complete blood test recently she is up-to-date with her ophthalmologist patient has recovered fully from her COVID-19 viral infection

## 2022-04-25 ENCOUNTER — NON-APPOINTMENT (OUTPATIENT)
Age: 87
End: 2022-04-25

## 2022-04-26 ENCOUNTER — APPOINTMENT (OUTPATIENT)
Dept: INTERNAL MEDICINE | Facility: CLINIC | Age: 87
End: 2022-04-26

## 2022-07-04 ENCOUNTER — INPATIENT (INPATIENT)
Facility: HOSPITAL | Age: 87
LOS: 6 days | Discharge: ANOTHER HOSPITAL | DRG: 280 | End: 2022-07-11
Attending: HOSPITALIST | Admitting: HOSPITALIST
Payer: MEDICARE

## 2022-07-04 VITALS
TEMPERATURE: 98 F | HEIGHT: 65 IN | WEIGHT: 149.91 LBS | RESPIRATION RATE: 18 BRPM | OXYGEN SATURATION: 91 % | DIASTOLIC BLOOD PRESSURE: 92 MMHG | SYSTOLIC BLOOD PRESSURE: 149 MMHG | HEART RATE: 106 BPM

## 2022-07-04 DIAGNOSIS — I50.9 HEART FAILURE, UNSPECIFIED: ICD-10-CM

## 2022-07-04 DIAGNOSIS — E89.0 POSTPROCEDURAL HYPOTHYROIDISM: Chronic | ICD-10-CM

## 2022-07-04 LAB
ALBUMIN SERPL ELPH-MCNC: 3.1 G/DL — LOW (ref 3.3–5)
ALP SERPL-CCNC: 58 U/L — SIGNIFICANT CHANGE UP (ref 40–120)
ALT FLD-CCNC: 14 U/L — SIGNIFICANT CHANGE UP (ref 10–45)
ANION GAP SERPL CALC-SCNC: 8 MMOL/L — SIGNIFICANT CHANGE UP (ref 5–17)
AST SERPL-CCNC: 7 U/L — LOW (ref 10–40)
BASOPHILS # BLD AUTO: 0.02 K/UL — SIGNIFICANT CHANGE UP (ref 0–0.2)
BASOPHILS NFR BLD AUTO: 0.4 % — SIGNIFICANT CHANGE UP (ref 0–2)
BILIRUB SERPL-MCNC: 0.4 MG/DL — SIGNIFICANT CHANGE UP (ref 0.2–1.2)
BUN SERPL-MCNC: 27 MG/DL — HIGH (ref 7–23)
CALCIUM SERPL-MCNC: 8.6 MG/DL — SIGNIFICANT CHANGE UP (ref 8.4–10.5)
CHLORIDE SERPL-SCNC: 110 MMOL/L — HIGH (ref 96–108)
CO2 SERPL-SCNC: 27 MMOL/L — SIGNIFICANT CHANGE UP (ref 22–31)
CREAT SERPL-MCNC: 1 MG/DL — SIGNIFICANT CHANGE UP (ref 0.5–1.3)
EGFR: 54 ML/MIN/1.73M2 — LOW
EOSINOPHIL # BLD AUTO: 0.12 K/UL — SIGNIFICANT CHANGE UP (ref 0–0.5)
EOSINOPHIL NFR BLD AUTO: 2.4 % — SIGNIFICANT CHANGE UP (ref 0–6)
GLUCOSE SERPL-MCNC: 155 MG/DL — HIGH (ref 70–99)
HCT VFR BLD CALC: 40.1 % — SIGNIFICANT CHANGE UP (ref 34.5–45)
HGB BLD-MCNC: 13.1 G/DL — SIGNIFICANT CHANGE UP (ref 11.5–15.5)
IMM GRANULOCYTES NFR BLD AUTO: 0.2 % — SIGNIFICANT CHANGE UP (ref 0–1.5)
INR BLD: 1.07 RATIO — SIGNIFICANT CHANGE UP (ref 0.88–1.16)
LYMPHOCYTES # BLD AUTO: 0.66 K/UL — LOW (ref 1–3.3)
LYMPHOCYTES # BLD AUTO: 13.1 % — SIGNIFICANT CHANGE UP (ref 13–44)
MAGNESIUM SERPL-MCNC: 2.1 MG/DL — SIGNIFICANT CHANGE UP (ref 1.6–2.6)
MAGNESIUM SERPL-MCNC: 2.1 MG/DL — SIGNIFICANT CHANGE UP (ref 1.6–2.6)
MCHC RBC-ENTMCNC: 30.3 PG — SIGNIFICANT CHANGE UP (ref 27–34)
MCHC RBC-ENTMCNC: 32.7 GM/DL — SIGNIFICANT CHANGE UP (ref 32–36)
MCV RBC AUTO: 92.8 FL — SIGNIFICANT CHANGE UP (ref 80–100)
MONOCYTES # BLD AUTO: 0.62 K/UL — SIGNIFICANT CHANGE UP (ref 0–0.9)
MONOCYTES NFR BLD AUTO: 12.3 % — SIGNIFICANT CHANGE UP (ref 2–14)
NEUTROPHILS # BLD AUTO: 3.61 K/UL — SIGNIFICANT CHANGE UP (ref 1.8–7.4)
NEUTROPHILS NFR BLD AUTO: 71.6 % — SIGNIFICANT CHANGE UP (ref 43–77)
NRBC # BLD: 0 /100 WBCS — SIGNIFICANT CHANGE UP (ref 0–0)
NT-PROBNP SERPL-SCNC: 5965 PG/ML — HIGH (ref 0–300)
PLATELET # BLD AUTO: 180 K/UL — SIGNIFICANT CHANGE UP (ref 150–400)
POTASSIUM SERPL-MCNC: 3.7 MMOL/L — SIGNIFICANT CHANGE UP (ref 3.5–5.3)
POTASSIUM SERPL-SCNC: 3.7 MMOL/L — SIGNIFICANT CHANGE UP (ref 3.5–5.3)
PROT SERPL-MCNC: 6.7 G/DL — SIGNIFICANT CHANGE UP (ref 6–8.3)
PROTHROM AB SERPL-ACNC: 12.4 SEC — SIGNIFICANT CHANGE UP (ref 10.5–13.4)
RAPID RVP RESULT: SIGNIFICANT CHANGE UP
RBC # BLD: 4.32 M/UL — SIGNIFICANT CHANGE UP (ref 3.8–5.2)
RBC # FLD: 15.4 % — HIGH (ref 10.3–14.5)
SARS-COV-2 RNA SPEC QL NAA+PROBE: SIGNIFICANT CHANGE UP
SODIUM SERPL-SCNC: 145 MMOL/L — SIGNIFICANT CHANGE UP (ref 135–145)
TROPONIN I, HIGH SENSITIVITY RESULT: 285.3 NG/L — HIGH
TROPONIN I, HIGH SENSITIVITY RESULT: 294.9 NG/L — HIGH
TSH SERPL-MCNC: 1.65 UIU/ML — SIGNIFICANT CHANGE UP (ref 0.36–3.74)
WBC # BLD: 5.04 K/UL — SIGNIFICANT CHANGE UP (ref 3.8–10.5)
WBC # FLD AUTO: 5.04 K/UL — SIGNIFICANT CHANGE UP (ref 3.8–10.5)

## 2022-07-04 PROCEDURE — 71045 X-RAY EXAM CHEST 1 VIEW: CPT | Mod: 26

## 2022-07-04 PROCEDURE — 99222 1ST HOSP IP/OBS MODERATE 55: CPT

## 2022-07-04 PROCEDURE — 93010 ELECTROCARDIOGRAM REPORT: CPT

## 2022-07-04 PROCEDURE — 99285 EMERGENCY DEPT VISIT HI MDM: CPT

## 2022-07-04 RX ORDER — FUROSEMIDE 40 MG
20 TABLET ORAL
Refills: 0 | Status: DISCONTINUED | OUTPATIENT
Start: 2022-07-04 | End: 2022-07-11

## 2022-07-04 RX ORDER — ASPIRIN/CALCIUM CARB/MAGNESIUM 324 MG
324 TABLET ORAL ONCE
Refills: 0 | Status: COMPLETED | OUTPATIENT
Start: 2022-07-04 | End: 2022-07-04

## 2022-07-04 RX ORDER — FUROSEMIDE 40 MG
20 TABLET ORAL ONCE
Refills: 0 | Status: COMPLETED | OUTPATIENT
Start: 2022-07-04 | End: 2022-07-04

## 2022-07-04 RX ORDER — ASPIRIN/CALCIUM CARB/MAGNESIUM 324 MG
81 TABLET ORAL DAILY
Refills: 0 | Status: DISCONTINUED | OUTPATIENT
Start: 2022-07-05 | End: 2022-07-11

## 2022-07-04 RX ORDER — ACETAMINOPHEN 500 MG
650 TABLET ORAL EVERY 6 HOURS
Refills: 0 | Status: DISCONTINUED | OUTPATIENT
Start: 2022-07-04 | End: 2022-07-11

## 2022-07-04 RX ORDER — LOSARTAN POTASSIUM 100 MG/1
50 TABLET, FILM COATED ORAL DAILY
Refills: 0 | Status: DISCONTINUED | OUTPATIENT
Start: 2022-07-04 | End: 2022-07-11

## 2022-07-04 RX ADMIN — Medication 20 MILLIGRAM(S): at 17:57

## 2022-07-04 RX ADMIN — Medication 324 MILLIGRAM(S): at 18:00

## 2022-07-04 NOTE — H&P ADULT - HISTORY OF PRESENT ILLNESS
89yo female with hx of HTN, chronic back pain, presented to the ED with complaints of progressively worsening sob x a few weeks to possibly months associated with LE edema. Pt is accompanied by her 2 sons, Alphonso and Juanjose who state pt has not complained of sob to them. They have noted her to have develop B/L LE edema for the past week. She has not been compliant with her diet as well as taking her medication. Today she came back from her other sons house and was noted to be sob w/ mild labored breathing for which reason they brought her to the ED. Pt states she used have SCHNEIDER but now it has progressed to orthopnea and PND with difficulty laying flat. She sleeps in a chair. Denies cp, palpitations, abd pain, N/V, fever, chills

## 2022-07-04 NOTE — H&P ADULT - MUSCULOSKELETAL
normal/ROM intact/strength 5/5 bilateral upper extremities/strength 5/5 bilateral lower extremities negative

## 2022-07-04 NOTE — H&P ADULT - ASSESSMENT
87yo female with hx of HTN, chronic back pain, presented to the ED with complaints of progressively worsening sob x a few weeks to possibly months associated with LE edema, concerning for new onset acute decompensated CHF, unspecified    #Acute respiratory failure w/ hypoxia  #LE Edema  #HTN  -Likely contributing factors to undiagnosed, unspecified CHF exacerbation  -S/p Lasix 20mg IV in the ED  -Continue Lasix 20mg IV BID  -ECHO  -Monitor daily weights + I/Os  -DASH diet w/ fluid restriction  -Restart home Losartan  -Likely need medical optimization  -Cardiology consulted    #Elevated Troponin   -Likely demand ischemia  -Trend Troponin  -Continue ASA  -Cardiology consulted   -Monitor tele      Updated Juanjose ramirez and Nino, at bedside

## 2022-07-04 NOTE — ED ADULT NURSE NOTE - ISOLATION TYPE:
GUNNAR FLOYD    Patient Age: 5 year old   Refill request by: Phone.  Patient wants a call back? No  Refill to be: ePrescribed to [facility] pharmacy    Medication requested to be refilled:   QVAR REDIHALER 40 MCG/ACT AERB       Next and Last Visit with Provider and Department  Last visit with this provider: 1/4/2019  Last visit with this department: 1/4/2019    Next visit with this provider: Visit date not found  Next visit with this department: Visit date not found    WEIGHT AND HEIGHT:   Wt Readings from Last 1 Encounters:   12/29/17 17.7 kg (39 lb) (56 %, Z= 0.16)*     * Growth percentiles are based on CDC (Girls, 2-20 Years) data.     Ht Readings from Last 1 Encounters:   12/29/17 3' 6\" (1.067 m) (58 %, Z= 0.21)*     * Growth percentiles are based on CDC (Girls, 2-20 Years) data.     BMI Readings from Last 1 Encounters:   12/29/17 15.54 kg/m² (61 %, Z= 0.28)*     * Growth percentiles are based on CDC (Girls, 2-20 Years) data.       ALLERGIES: not on file.  Current Outpatient Medications   Medication   • albuterol (PROAIR HFA) 108 (90 Base) MCG/ACT inhaler     No current facility-administered medications for this visit.      PHARMACY to use:           Pharmacy preference(s) on file:   [id number] - [place], [place] - [address] [place] [address]  [address] [place]  [facility] [address]  Phone: [phone] Fax: [phone]      CALL BACK INFO: Ok to leave response (including medical information) on answering machine  ROUTING: Patient's physician/staff        PCP: No primary care provider on file.         INS: Payor: [employer] / Plan: [id number] / Product Type: O MISC   PATIENT ADDRESS:  [address] [facility] [address]     None

## 2022-07-04 NOTE — PATIENT PROFILE ADULT - FALL HARM RISK - HARM RISK INTERVENTIONS

## 2022-07-04 NOTE — ED PROVIDER NOTE - PROGRESS NOTE DETAILS
Jaison HAMILTON: Spoke with  Dr. Mendoza- aware of patient- will dicuss with Dr. Albert- accepts admission; no st elevations on ekg; lasix; po asa ordered at this time. will trend troponin.

## 2022-07-04 NOTE — ED ADULT NURSE NOTE - OBJECTIVE STATEMENT
Patient came from home complaining about SOB for the past couple of months. Patient denies pain at this time. RA 91%, O2 3L in place via nasal cannula, Pulse ox 95%. Patient states to have a cough but denies any chest pain, fever, chills.

## 2022-07-04 NOTE — ED PROVIDER NOTE - NEUROLOGICAL, MLM
Alert and oriented, no focal deficits, no motor or sensory deficits; 2+ pitting lower extremity edema;

## 2022-07-04 NOTE — H&P ADULT - CARDIOVASCULAR
normal/regular rate and rhythm/S1 S2 present/no gallops/no rub/no murmur/tachycardia/pedal edema details…

## 2022-07-04 NOTE — ED PROVIDER NOTE - OBJECTIVE STATEMENT
Patient is a 87 yo female with sob x weeks; worsening in last few days; per son- takes a "water pill" but is not compliant with his medication regimen; b/l lower extremity edema; no fever; no chills; no new cough; no chest pain; patient's o2 sat: 91% on RA upon arrival; placed on 2L NC: 93-94% on RA with improvement in sob at this time.

## 2022-07-05 ENCOUNTER — TRANSCRIPTION ENCOUNTER (OUTPATIENT)
Age: 87
End: 2022-07-05

## 2022-07-05 LAB
ANION GAP SERPL CALC-SCNC: 10 MMOL/L — SIGNIFICANT CHANGE UP (ref 5–17)
BASOPHILS # BLD AUTO: 0.03 K/UL — SIGNIFICANT CHANGE UP (ref 0–0.2)
BASOPHILS NFR BLD AUTO: 0.6 % — SIGNIFICANT CHANGE UP (ref 0–2)
BUN SERPL-MCNC: 23 MG/DL — SIGNIFICANT CHANGE UP (ref 7–23)
CALCIUM SERPL-MCNC: 8.3 MG/DL — LOW (ref 8.4–10.5)
CHLORIDE SERPL-SCNC: 106 MMOL/L — SIGNIFICANT CHANGE UP (ref 96–108)
CO2 SERPL-SCNC: 28 MMOL/L — SIGNIFICANT CHANGE UP (ref 22–31)
CREAT SERPL-MCNC: 1.07 MG/DL — SIGNIFICANT CHANGE UP (ref 0.5–1.3)
EGFR: 50 ML/MIN/1.73M2 — LOW
EOSINOPHIL # BLD AUTO: 0.16 K/UL — SIGNIFICANT CHANGE UP (ref 0–0.5)
EOSINOPHIL NFR BLD AUTO: 3 % — SIGNIFICANT CHANGE UP (ref 0–6)
GLUCOSE SERPL-MCNC: 114 MG/DL — HIGH (ref 70–99)
HCT VFR BLD CALC: 40.1 % — SIGNIFICANT CHANGE UP (ref 34.5–45)
HGB BLD-MCNC: 13.2 G/DL — SIGNIFICANT CHANGE UP (ref 11.5–15.5)
IMM GRANULOCYTES NFR BLD AUTO: 0.4 % — SIGNIFICANT CHANGE UP (ref 0–1.5)
LYMPHOCYTES # BLD AUTO: 0.6 K/UL — LOW (ref 1–3.3)
LYMPHOCYTES # BLD AUTO: 11.1 % — LOW (ref 13–44)
MAGNESIUM SERPL-MCNC: 1.8 MG/DL — SIGNIFICANT CHANGE UP (ref 1.6–2.6)
MCHC RBC-ENTMCNC: 30.3 PG — SIGNIFICANT CHANGE UP (ref 27–34)
MCHC RBC-ENTMCNC: 32.9 GM/DL — SIGNIFICANT CHANGE UP (ref 32–36)
MCV RBC AUTO: 92 FL — SIGNIFICANT CHANGE UP (ref 80–100)
MONOCYTES # BLD AUTO: 0.66 K/UL — SIGNIFICANT CHANGE UP (ref 0–0.9)
MONOCYTES NFR BLD AUTO: 12.2 % — SIGNIFICANT CHANGE UP (ref 2–14)
NEUTROPHILS # BLD AUTO: 3.93 K/UL — SIGNIFICANT CHANGE UP (ref 1.8–7.4)
NEUTROPHILS NFR BLD AUTO: 72.7 % — SIGNIFICANT CHANGE UP (ref 43–77)
NRBC # BLD: 0 /100 WBCS — SIGNIFICANT CHANGE UP (ref 0–0)
PLATELET # BLD AUTO: 179 K/UL — SIGNIFICANT CHANGE UP (ref 150–400)
POTASSIUM SERPL-MCNC: 3.1 MMOL/L — LOW (ref 3.5–5.3)
POTASSIUM SERPL-SCNC: 3.1 MMOL/L — LOW (ref 3.5–5.3)
RBC # BLD: 4.36 M/UL — SIGNIFICANT CHANGE UP (ref 3.8–5.2)
RBC # FLD: 15.3 % — HIGH (ref 10.3–14.5)
SODIUM SERPL-SCNC: 144 MMOL/L — SIGNIFICANT CHANGE UP (ref 135–145)
TROPONIN I, HIGH SENSITIVITY RESULT: 235.3 NG/L — HIGH
WBC # BLD: 5.4 K/UL — SIGNIFICANT CHANGE UP (ref 3.8–10.5)
WBC # FLD AUTO: 5.4 K/UL — SIGNIFICANT CHANGE UP (ref 3.8–10.5)

## 2022-07-05 PROCEDURE — 99232 SBSQ HOSP IP/OBS MODERATE 35: CPT

## 2022-07-05 PROCEDURE — 99222 1ST HOSP IP/OBS MODERATE 55: CPT

## 2022-07-05 PROCEDURE — 93306 TTE W/DOPPLER COMPLETE: CPT | Mod: 26

## 2022-07-05 RX ORDER — METOPROLOL TARTRATE 50 MG
12.5 TABLET ORAL
Refills: 0 | Status: DISCONTINUED | OUTPATIENT
Start: 2022-07-05 | End: 2022-07-07

## 2022-07-05 RX ADMIN — Medication 12.5 MILLIGRAM(S): at 17:12

## 2022-07-05 RX ADMIN — Medication 20 MILLIGRAM(S): at 15:21

## 2022-07-05 RX ADMIN — Medication 20 MILLIGRAM(S): at 05:08

## 2022-07-05 RX ADMIN — Medication 81 MILLIGRAM(S): at 11:20

## 2022-07-05 RX ADMIN — LOSARTAN POTASSIUM 50 MILLIGRAM(S): 100 TABLET, FILM COATED ORAL at 05:08

## 2022-07-05 RX ADMIN — Medication 650 MILLIGRAM(S): at 21:55

## 2022-07-05 RX ADMIN — Medication 650 MILLIGRAM(S): at 20:55

## 2022-07-05 NOTE — CONSULT NOTE ADULT - ASSESSMENT
Assessment:  Justina Mclaughlin is an 88 year old woman with past medical history of Hypertension and chronic back pain who presents with increasing shortness of breath and lower extremity swelling.   Assessment:  Justina Mclaughlin is an 88 year old woman with past medical history of Hypertension and chronic back pain who presents with progressive dyspnea on exertion and lower extremity swelling, found to have signs suggestive of congestive heart failure.    ECG is consistent with sinus tachycardia and nonspecific ST abnormalities. Troponins elevated which may be demand ischemia from volume overload, cannot rule out underlying CAD. Pro BNP significantly elevated.    Recommendations:  [] Congestive heart failure: Continue Lasix IV as dosed. Follow up echo to evaluate LVEF and valve function. Continue to monitor on telemetry. Monitor renal function and urine output. Replete K. Patient also recommended to avoid high sodium foods. Continue home Losartan  [] Elevated troponin: Will plan for nuclear stress test to evaluate for ischemic heart disease when euvolemic, likely by Friday pending hospital course. Given age, sons are concerned about possible complications with coronary angiogram and so they favor a more conservative approach. Continue Aspirin.    We will continue to follow along.    Ranjeet Juarez MD  Cardiology

## 2022-07-05 NOTE — DIETITIAN INITIAL EVALUATION ADULT - ORAL INTAKE PTA/DIET HISTORY
As per patient & son patient non compliant with diet , cooks with salt & uses garlic salt on salads , encouraged compliance due to dx of CHF

## 2022-07-05 NOTE — DIETITIAN INITIAL EVALUATION ADULT - OTHER INFO
89yo female with hx of HTN, chronic back pain, presented to the ED with complaints of progressively worsening sob x a few weeks to possibly months associated with LE edema. dx of CHF noted , appetite is reported good , recent changes in weight due to fluid retention. (+2) LE edema noted patient , Lasix therapy noted . Skin intact , 1200cc FR due to CHF , NKFA noted , no issue chewing /swallowing

## 2022-07-05 NOTE — PROGRESS NOTE ADULT - ASSESSMENT
89yo female with hx of HTN, chronic back pain, presented to the ED with complaints of progressively worsening sob x a few weeks to possibly months associated with LE edema, concerning for new onset acute decompensated CHF, unspecified    #Acute respiratory failure w/ hypoxia  #LE Edema  #HTN  -Likely contributing factors to undiagnosed, unspecified CHF exacerbation  -Continue Lasix 20mg IV BID  -ECHO performef-awaiting read  -Monitor daily weights + I/Os  -DASH diet w/ fluid restriction  -Cont home Losartan  -Cardiology consulted    #Elevated Troponin   -Likely demand ischemia  -Trend Troponin  -Continue ASA  -Cardiology consulted   -Monitor tele    #Hypokalemia:  K 3.1   replete and repeat     7/5: Updated Juanjose ramirez and Nino, at bedside   89yo female with hx of HTN, chronic back pain, presented to the ED with complaints of progressively worsening sob x a few weeks to possibly months associated with LE edema, concerning for new onset acute decompensated CHF, unspecified    #Acute respiratory failure w/ hypoxia  #LE Edema  #HTN  -due to systolic HF  -Continue Lasix 20mg IV BID  -TTE with EF 40%  - Start low dose lopressor, continue ARB  -Monitor daily weights + I/Os  -DASH diet w/ fluid restriction  -Cardiology consulted appreciated--suspect stress test Friday     #Elevated Troponin   -Likely demand ischemia  -Trend Troponin  -Continue ASA  -Cardiology consulted   -Monitor tele    #Hypokalemia:  K 3.1   replete and repeat     7/5: Updated Juanjose ramirez and Nino, at bedside  Dispo: stress test when euvolemic    87yo female with hx of HTN, chronic back pain, presented to the ED with complaints of progressively worsening sob x a few weeks to possibly months associated with LE edema, concerning for new onset acute decompensated CHF, unspecified    #Acute respiratory failure w/ hypoxia  #LE Edema  #HTN  -due to systolic HF  -Continue Lasix 20mg IV BID  -TTE with EF 40%  - Start low dose lopressor, continue ARB  -Monitor daily weights + I/Os  -DASH diet w/ fluid restriction  -Cardiology consulted appreciated--suspect stress test Friday     #Elevated Troponin   -Likely demand ischemia  -Trend Troponin  -Continue ASA  -Cardiology consulted   -Monitor tele    #Hypokalemia:  K 3.1   replete and repeat     7/5: Updated Juanjose ramirez and Nino, at bedside  Dispo: stress test when euvolemic

## 2022-07-05 NOTE — DISCHARGE NOTE PROVIDER - CARE PROVIDERS DIRECT ADDRESSES
[email or web address]

## 2022-07-05 NOTE — DISCHARGE NOTE PROVIDER - NSDCCPCAREPLAN_GEN_ALL_CORE_FT
PRINCIPAL DISCHARGE DIAGNOSIS  Diagnosis: CHF exacerbation  Assessment and Plan of Treatment: Pt presented with chest pain and shortness of breath.  Pt was worked up to evluate for acute heart attack.  Pt had elevated troponins and a nuclear stress test showing------.  Pt was placed on IV lasix for her heart failure and lower leg edema.  Pt was also started on metoprolol.  Pt is to continue the lasix and metoprolol and to follow up with DR Escobar as an out paiteint next week       PRINCIPAL DISCHARGE DIAGNOSIS  Diagnosis: CHF exacerbation  Assessment and Plan of Treatment: Pt presented with chest pain and shortness of breath.  Pt was worked up to evluate for acute heart attack.  Pt had elevated troponins and a nuclear stress test showing moderate profusion defect which is reversable .  Pt was placed on IV lasix for her heart failure and lower leg edema.  Pt was also started on metoprolol suc 25 mg daily   Pt now being sent for cardiac cath [facility]

## 2022-07-05 NOTE — DISCHARGE NOTE PROVIDER - NSDCMRMEDTOKEN_GEN_ALL_CORE_FT
aspirin 81 mg oral delayed release tablet: 1 tab(s) orally 2 times a day   Cozaar 50 mg oral tablet: 1 tab(s) orally once a day   furosemide 100 mg/100 mL-0.9% intravenous solution: 20 milliliter(s) intravenous 2 times a day  metoprolol succinate 25 mg oral tablet, extended release: 1 tab(s) orally once a day   furosemide 40 mg oral tablet: 1 tab(s) orally once a day  metoprolol succinate 25 mg oral tablet, extended release: 1 tab(s) orally once a day   aspirin 81 mg oral tablet: 1 tab(s) orally once a day  furosemide 40 mg oral tablet: 1 tab(s) orally once a day  losartan 50 mg oral tablet: 1 tab(s) orally once a day  metoprolol succinate 25 mg oral tablet, extended release: 1 tab(s) orally once a day

## 2022-07-05 NOTE — DISCHARGE NOTE PROVIDER - CARE PROVIDER_API CALL
Chago Deal  GASTROENTEROLOGY  [address] [place] [address], [address]  [place], [place] [id number]  Phone: [phone]  Fax: [phone]  Follow Up Time:

## 2022-07-05 NOTE — PHARMACOTHERAPY INTERVENTION NOTE - COMMENTS
discussed current medications with patient   patient expressed understanding  all questions answered   time spent on education 10 min

## 2022-07-05 NOTE — DIETITIAN INITIAL EVALUATION ADULT - NS FNS DIET ORDER
Diet, Regular:   DASH/TLC {Sodium & Cholesterol Restricted}  1200mL Fluid Restriction ([id number]) (07-04-22 @ 17:49)

## 2022-07-05 NOTE — CONSULT NOTE ADULT - SUBJECTIVE AND OBJECTIVE BOX
NEIL MOORE  [id number]      HPI:    Neil Moore is an 88 year old woman with past medical history of Hypertension and chronic back pain who presents with increasing shortness of breath and lower extremity swelling.      ALLERGIES:  No Known Allergies      PAST MEDICAL & SURGICAL HISTORY:  HTN (hypertension)  S/P partial thyroidectomy      CURRENT MEDICATIONS:  acetaminophen     Tablet .. 650 milliGRAM(s) Oral every 6 hours PRN  aspirin enteric coated 81 milliGRAM(s) Oral daily  furosemide   Injectable 20 milliGRAM(s) IV Push two times a day  losartan 50 milliGRAM(s) Oral daily        ROS:  All 10 systems reviewed and positives noted in HPI    OBJECTIVE:    VITAL SIGNS:  Vital Signs Last 24 Hrs  T(C): 36.7 (05 Jul 2022 09:28), Max: 37.1 (04 Jul 2022 20:17)  T(F): 98 (05 Jul 2022 09:28), Max: 98.7 (04 Jul 2022 20:17)  HR: 91 (05 Jul 2022 09:28) (73 - 106)  BP: 127/78 (05 Jul 2022 09:28) (127/78 - 165/100)  BP(mean): 94 (05 Jul 2022 09:28) (94 - 94)  RR: 16 (05 Jul 2022 09:28) (16 - 22)  SpO2: 95% (05 Jul 2022 09:28) (91% - 97%)    PHYSICAL EXAM:  General: well appearing, no distress  HEENT: sclera anicteric  Neck: supple, no carotid bruits b/l  CVS: JVP ~ 7 cm H20, RRR, s1, s2, no murmurs/rubs/gallops  Chest: unlabored respirations, clear to auscultation b/l  Abdomen: non-distended  Extremities: no lower extremity edema b/l  Neuro: awake, alert & oriented x 3  Psych: normal affect      LABS:                        13.2   5.40  )-----------( 179      ( 05 Jul 2022 07:15 )             40.1     07-05    144  |  106  |  23  ----------------------------<  114<H>  3.1<L>   |  28  |  1.07    Ca    8.3<L>      05 Jul 2022 07:15  Mg     1.8     07-05    TPro  6.7  /  Alb  3.1<L>  /  TBili  0.4  /  DBili  x   /  AST  7<L>  /  ALT  14  /  AlkPhos  58  07-04        PT/INR - ( 04 Jul 2022 16:37 )   PT: 12.4 sec;   INR: 1.07 ratio             ECG (7/4/22): sinus tachycardiac, PVC, nonspecific ST abnormalities       No prior cardiac workup in chart     NEIL MOORE  [id number]      HPI:    Neil Moore is an 88 year old woman with past medical history of Hypertension and chronic back pain presents with increasing shortness of breath and lower extremity swelling.    The patient is present with her 2 sons. She reports that recently she has had increasing shortness of breath on exertion and leg swelling. Has also had orthopnea. Her sons also report that yesterday she appeared visually unwell and had fatigue and shortness of breath on minimal exertion at a party. The patient denies chest discomfort. Denies prior cardiac history or following up with a cardiologist. Admits to eating salty food yesterday.       ALLERGIES:  No Known Allergies      PAST MEDICAL & SURGICAL HISTORY:  HTN (hypertension)  S/P partial thyroidectomy      CURRENT MEDICATIONS:  acetaminophen     Tablet .. 650 milliGRAM(s) Oral every 6 hours PRN  aspirin enteric coated 81 milliGRAM(s) Oral daily  furosemide   Injectable 20 milliGRAM(s) IV Push two times a day  losartan 50 milliGRAM(s) Oral daily        ROS:  All 10 systems reviewed and positives noted in HPI    OBJECTIVE:    VITAL SIGNS:  Vital Signs Last 24 Hrs  T(C): 36.7 (05 Jul 2022 09:28), Max: 37.1 (04 Jul 2022 20:17)  T(F): 98 (05 Jul 2022 09:28), Max: 98.7 (04 Jul 2022 20:17)  HR: 91 (05 Jul 2022 09:28) (73 - 106)  BP: 127/78 (05 Jul 2022 09:28) (127/78 - 165/100)  BP(mean): 94 (05 Jul 2022 09:28) (94 - 94)  RR: 16 (05 Jul 2022 09:28) (16 - 22)  SpO2: 95% (05 Jul 2022 09:28) (91% - 97%)    PHYSICAL EXAM:  General: elderly woman, no acute distress  HEENT: sclera anicteric  Neck: supple  CVS: JVP ~ 9 cm H20, RRR, s1, s2  Chest: unlabored respirations, decreased breath sounds   Extremities: no lower extremity edema b/l  Neuro: awake, alert & oriented x 3  Psych: normal affect      LABS:                        13.2   5.40  )-----------( 179      ( 05 Jul 2022 07:15 )             40.1     07-05    144  |  106  |  23  ----------------------------<  114<H>  3.1<L>   |  28  |  1.07    Ca    8.3<L>      05 Jul 2022 07:15  Mg     1.8     07-05    TPro  6.7  /  Alb  3.1<L>  /  TBili  0.4  /  DBili  x   /  AST  7<L>  /  ALT  14  /  AlkPhos  58  07-04        PT/INR - ( 04 Jul 2022 16:37 )   PT: 12.4 sec;   INR: 1.07 ratio             ECG (7/4/22): sinus tachycardia, PVC, nonspecific ST abnormalities       No prior cardiac workup in chart

## 2022-07-05 NOTE — PROGRESS NOTE ADULT - SUBJECTIVE AND OBJECTIVE BOX
Patient is a 88y old  Female who presents with a chief complaint of sob (04 Jul 2022 17:50)  No acute issues  Eager to go home     Patient seen and examined at bedside.    ALLERGIES:  No Known Allergies    MEDICATIONS  (STANDING):  aspirin enteric coated 81 milliGRAM(s) Oral daily  furosemide   Injectable 20 milliGRAM(s) IV Push two times a day  losartan 50 milliGRAM(s) Oral daily    MEDICATIONS  (PRN):  acetaminophen     Tablet .. 650 milliGRAM(s) Oral every 6 hours PRN Temp greater or equal to 38C (100.4F), Mild Pain (1 - 3)    Vital Signs Last 24 Hrs  T(F): 98 (05 Jul 2022 09:28), Max: 98.7 (04 Jul 2022 20:17)  HR: 91 (05 Jul 2022 09:28) (73 - 106)  BP: 127/78 (05 Jul 2022 09:28) (127/78 - 165/100)  RR: 16 (05 Jul 2022 09:28) (16 - 22)  SpO2: 95% (05 Jul 2022 09:28) (91% - 97%)  I&O's Summary    04 Jul 2022 07:01  -  05 Jul 2022 07:00  --------------------------------------------------------  IN: 0 mL / OUT: 1700 mL / NET: -1700 mL      BMI (kg/m2): 25.3 (07-04-22 @ 20:17)  PHYSICAL EXAM:  General: NAD, A/O x 3, elderly   ENT: MMM, no thrush  Neck: Supple, No JVD  Lungs: Non labored breathing,  decreased breath sounds bases bilaterally   Cardio: RRR, S1/S2,  1+ LE edema bilaterally  Abdomen: Soft, Nontender, Nondistended; Bowel sounds present  Extremities: No calf tenderness, moves all extremities    LABS:                        13.2   5.40  )-----------( 179      ( 05 Jul 2022 07:15 )             40.1       07-05    144  |  106  |  23  ----------------------------<  114  3.1   |  28  |  1.07    Ca    8.3      05 Jul 2022 07:15  Mg     1.8     07-05    TPro  6.7  /  Alb  3.1  /  TBili  0.4  /  DBili  x   /  AST  7   /  ALT  14  /  AlkPhos  58  07-04       PT/INR - ( 04 Jul 2022 16:37 )   PT: 12.4 sec;   INR: 1.07 ratio              CARDIAC MARKERS ( 04 Jul 2022 17:55 )  x     / 285.3 ng/L / x     / x     / x      CARDIAC MARKERS ( 04 Jul 2022 16:37 )  x     / 294.9 ng/L / x     / x     / x            TSH 1.653   TSH with FT4 reflex --  Total T3 --                          RADIOLOGY & ADDITIONAL TESTS:    Care Discussed with Consultants/Other Providers:

## 2022-07-05 NOTE — DIETITIAN INITIAL EVALUATION ADULT - PERTINENT LABORATORY DATA
07-05    144  |  106  |  23  ----------------------------<  114<H>  3.1<L>   |  28  |  1.07    Ca    8.3<L>      05 Jul 2022 07:15  Mg     1.8     07-05    TPro  6.7  /  Alb  3.1<L>  /  TBili  0.4  /  DBili  x   /  AST  7<L>  /  ALT  14  /  AlkPhos  58  07-04

## 2022-07-05 NOTE — PROGRESS NOTE ADULT - NS ATTEND AMEND GEN_ALL_CORE FT
87yo female with hx of HTN, chronic back pain, presented to the ED with complaints of progressively worsening sob x a few weeks to possibly months associated with LE edema, concerning for new onset acute decompensated CHF, unspecified.    Acute hypoxic respiratory failure  Elevated troponins  - Stress test Friday

## 2022-07-05 NOTE — PHYSICAL THERAPY INITIAL EVALUATION ADULT - ADDITIONAL COMMENTS
pt lives in [facility] in sons house, 6 heather w/rail, 1st floor setup. pt has rw which she does not use. pt is independent w/most ADL's.

## 2022-07-05 NOTE — DIETITIAN INITIAL EVALUATION ADULT - PERTINENT MEDS FT
MEDICATIONS  (STANDING):  aspirin enteric coated 81 milliGRAM(s) Oral daily  furosemide   Injectable 20 milliGRAM(s) IV Push two times a day  losartan 50 milliGRAM(s) Oral daily    MEDICATIONS  (PRN):  acetaminophen     Tablet .. 650 milliGRAM(s) Oral every 6 hours PRN Temp greater or equal to 38C (100.4F), Mild Pain (1 - 3)

## 2022-07-05 NOTE — DISCHARGE NOTE PROVIDER - HOSPITAL COURSE
89yo female with hx of HTN, chronic back pain, presented to the ED with complaints of progressively worsening sob x a few weeks to possibly months associated with LE edema, Admitted to hospitalist. Started on IV lasix 20 mg BID.  ProBNP elevated. ECG is consistent with sinus tachycardia and nonspecific ST abnormalities. Troponins elevated which may be demand ischemia from volume overload. Stress test was done to rule out CAD which revealed __________  TTE with EF 40%, moderate LV hypokinesis. Started on beta blocker in addition to ARB for systolic heart failure    PT recommended ___________      **RESULTS**  < from: TTE Echo Complete w/o Contrast w/ Doppler (07.05.22 @ 09:32) >      Summary:   1. Moderately decreased global left ventricular systolic function.   2. Left ventricular ejection fraction, by visual estimation, is 40%.   3. Moderate global left ventricle hypokinesis.   4. Normal left ventricular internal cavity size.   5. The right ventricle is not well visualized, appears tohave normal   systolic function.   6. The left atrium is normal in size.   7. Mildly enlarged right atrium.   8. Mild thickening of the anterior and posterior mitral valve leaflets.   9. Mild mitral annular calcification.  10. Moderate mitral valve regurgitation.  11. Mild tricuspid regurgitation.  12. Mild aortic valve leaflet calcification. No aortic valve stenosis.  13. Mild to moderate aortic regurgitation.  14. Mildly dilated proximal ascending aorta (3.5 cm).  15. Moderate pleural effusion in the left lateral region.  16. There is no evidence of pericardial effusion.    [id number] Ranjeet Juarez MD Electronically signed on   7/5/2022 at 2:41:26 PM    < end of copied text >     89yo female with hx of HTN, chronic back pain, presented to the ED with complaints of progressively worsening sob x a few weeks to possibly months associated with LE edema, Admitted to hospitalist. Started on IV lasix 20 mg BID.  ProBNP elevated. ECG is consistent with sinus tachycardia and nonspecific ST abnormalities. Troponins elevated which may be demand ischemia from volume overload. Stress test was done to rule out CAD which revealed __________  TTE with EF 40%, moderate LV hypokinesis. Started on beta blocker in addition to ARB for systolic heart failure    PT recommends Home with Home PT       **RESULTS**  < from: TTE Echo Complete w/o Contrast w/ Doppler (07.05.22 @ 09:32) >      Summary:   1. Moderately decreased global left ventricular systolic function.   2. Left ventricular ejection fraction, by visual estimation, is 40%.   3. Moderate global left ventricle hypokinesis.   4. Normal left ventricular internal cavity size.   5. The right ventricle is not well visualized, appears tohave normal   systolic function.   6. The left atrium is normal in size.   7. Mildly enlarged right atrium.   8. Mild thickening of the anterior and posterior mitral valve leaflets.   9. Mild mitral annular calcification.  10. Moderate mitral valve regurgitation.  11. Mild tricuspid regurgitation.  12. Mild aortic valve leaflet calcification. No aortic valve stenosis.  13. Mild to moderate aortic regurgitation.  14. Mildly dilated proximal ascending aorta (3.5 cm).  15. Moderate pleural effusion in the left lateral region.  16. There is no evidence of pericardial effusion.    [id number] Ranjeet Juarez MD Electronically signed on   7/5/2022 at 2:41:26 PM    < end of copied text >     87yo female with hx of HTN, chronic back pain, presented to the ED with complaints of progressively worsening sob x a few weeks to possibly months associated with LE edema, Admitted to hospitalist. Started on IV lasix 20 mg BID.  ProBNP elevated. ECG is consistent with sinus tachycardia and nonspecific ST abnormalities. Troponins elevated which may be demand ischemia from volume overload. Stress test was done to rule out CADwhich revealed a moderate reversable filling defect .  Pt opted for Cardiac cath Set up with [facility] DR JUAREZ   TTE with EF 40%, moderate LV hypokinesis. Started on beta blocker in addition to ARB for systolic heart failure. Continue Iv lasix  until after cath.     Abnormal SPECT Myocardial Perfusion Imaging post rest and   post vasodilator.       There is a moderate-sized reversible perfusion defect of the apical   cap, apical inferior wall and mid inferior wall that is suggestive of   ischemia. Mild diaphragmatic attenuation artifact reduces sensitivity of   these findings.       Abnormal left ventricular wall motion with ejection fraction of 48 %   (normal: 50% or greater).       Global left ventricle hypokinesis.      Please refer to cardiac stress test report.  DR Estrada       PT recommends Home with Home PT       **RESULTS**  < from: TTE Echo Complete w/o Contrast w/ Doppler (07.05.22 @ 09:32) >      Summary:   1. Moderately decreased global left ventricular systolic function.   2. Left ventricular ejection fraction, by visual estimation, is 40%.   3. Moderate global left ventricle hypokinesis.   4. Normal left ventricular internal cavity size.   5. The right ventricle is not well visualized, appears tohave normal   systolic function.   6. The left atrium is normal in size.   7. Mildly enlarged right atrium.   8. Mild thickening of the anterior and posterior mitral valve leaflets.   9. Mild mitral annular calcification.  10. Moderate mitral valve regurgitation.  11. Mild tricuspid regurgitation.  12. Mild aortic valve leaflet calcification. No aortic valve stenosis.  13. Mild to moderate aortic regurgitation.  14. Mildly dilated proximal ascending aorta (3.5 cm).  15. Moderate pleural effusion in the left lateral region.  16. There is no evidence of pericardial effusion.    [id number] Ranjeet Juarez MD Electronically signed on   7/5/2022 at 2:41:26 PM    < end of copied text >

## 2022-07-06 LAB
ANION GAP SERPL CALC-SCNC: 7 MMOL/L — SIGNIFICANT CHANGE UP (ref 5–17)
BUN SERPL-MCNC: 31 MG/DL — HIGH (ref 7–23)
CALCIUM SERPL-MCNC: 8.3 MG/DL — LOW (ref 8.4–10.5)
CHLORIDE SERPL-SCNC: 106 MMOL/L — SIGNIFICANT CHANGE UP (ref 96–108)
CO2 SERPL-SCNC: 31 MMOL/L — SIGNIFICANT CHANGE UP (ref 22–31)
CREAT SERPL-MCNC: 1.15 MG/DL — SIGNIFICANT CHANGE UP (ref 0.5–1.3)
EGFR: 46 ML/MIN/1.73M2 — LOW
GLUCOSE SERPL-MCNC: 110 MG/DL — HIGH (ref 70–99)
MAGNESIUM SERPL-MCNC: 2 MG/DL — SIGNIFICANT CHANGE UP (ref 1.6–2.6)
POTASSIUM SERPL-MCNC: 3.5 MMOL/L — SIGNIFICANT CHANGE UP (ref 3.5–5.3)
POTASSIUM SERPL-SCNC: 3.5 MMOL/L — SIGNIFICANT CHANGE UP (ref 3.5–5.3)
SODIUM SERPL-SCNC: 144 MMOL/L — SIGNIFICANT CHANGE UP (ref 135–145)
TROPONIN I, HIGH SENSITIVITY RESULT: 161.5 NG/L — HIGH

## 2022-07-06 PROCEDURE — 99232 SBSQ HOSP IP/OBS MODERATE 35: CPT

## 2022-07-06 PROCEDURE — 71045 X-RAY EXAM CHEST 1 VIEW: CPT | Mod: 26

## 2022-07-06 PROCEDURE — 99233 SBSQ HOSP IP/OBS HIGH 50: CPT

## 2022-07-06 RX ORDER — LANOLIN ALCOHOL/MO/W.PET/CERES
3 CREAM (GRAM) TOPICAL ONCE
Refills: 0 | Status: COMPLETED | OUTPATIENT
Start: 2022-07-06 | End: 2022-07-06

## 2022-07-06 RX ORDER — FUROSEMIDE 40 MG
20 TABLET ORAL ONCE
Refills: 0 | Status: COMPLETED | OUTPATIENT
Start: 2022-07-06 | End: 2022-07-06

## 2022-07-06 RX ORDER — POTASSIUM CHLORIDE 20 MEQ
40 PACKET (EA) ORAL ONCE
Refills: 0 | Status: COMPLETED | OUTPATIENT
Start: 2022-07-06 | End: 2022-07-06

## 2022-07-06 RX ADMIN — Medication 40 MILLIEQUIVALENT(S): at 12:09

## 2022-07-06 RX ADMIN — Medication 81 MILLIGRAM(S): at 12:09

## 2022-07-06 RX ADMIN — LOSARTAN POTASSIUM 50 MILLIGRAM(S): 100 TABLET, FILM COATED ORAL at 05:33

## 2022-07-06 RX ADMIN — Medication 20 MILLIGRAM(S): at 17:46

## 2022-07-06 RX ADMIN — Medication 650 MILLIGRAM(S): at 22:15

## 2022-07-06 RX ADMIN — Medication 20 MILLIGRAM(S): at 22:22

## 2022-07-06 RX ADMIN — Medication 20 MILLIGRAM(S): at 05:34

## 2022-07-06 RX ADMIN — Medication 40 MILLIEQUIVALENT(S): at 00:36

## 2022-07-06 RX ADMIN — Medication 650 MILLIGRAM(S): at 21:15

## 2022-07-06 RX ADMIN — Medication 12.5 MILLIGRAM(S): at 05:33

## 2022-07-06 RX ADMIN — Medication 12.5 MILLIGRAM(S): at 17:47

## 2022-07-06 RX ADMIN — Medication 3 MILLIGRAM(S): at 21:15

## 2022-07-06 NOTE — PROGRESS NOTE ADULT - NS ATTEND AMEND GEN_ALL_CORE FT
89yo female with hx of HTN, chronic back pain, presented to the ED with complaints of progressively worsening sob x a few weeks to possibly months associated with LE edema, concerning for new onset acute decompensated CHF, unspecified.    Acute hypoxic respiratory failure  Acute HF  Elevated troponins  - Discussed case, TTE and plan with cardio Dr. Juarez - will continue diuresing with same dose of IV Lasix today, and decision to be made by patient and family regarding stress test vs. cardiac cath, likely for Friday  - TTE reviewed - EF ~40% Patient seen and examined at bedside on 7/6/22.    87yo female with hx of HTN, chronic back pain, presented to the ED with complaints of progressively worsening sob x a few weeks to possibly months associated with LE edema, concerning for new onset acute decompensated CHF, unspecified.    Acute hypoxic respiratory failure  Acute HF  Elevated troponins  - Discussed case, TTE and plan with cardio Dr. Juarez - will continue diuresing with same dose of IV Lasix today, and decision to be made by patient and family regarding stress test vs. cardiac cath, likely for Friday  - TTE reviewed - EF ~40%

## 2022-07-06 NOTE — PROGRESS NOTE ADULT - SUBJECTIVE AND OBJECTIVE BOX
Patient is a 88y old  Female who presents with a chief complaint of sob (05 Jul 2022 15:24)  Eager to go home  Willing to stay for stress test     Patient seen and examined at bedside.    ALLERGIES:  No Known Allergies    MEDICATIONS  (STANDING):  aspirin enteric coated 81 milliGRAM(s) Oral daily  furosemide   Injectable 20 milliGRAM(s) IV Push two times a day  losartan 50 milliGRAM(s) Oral daily  metoprolol tartrate 12.5 milliGRAM(s) Oral two times a day  potassium chloride    Tablet ER 40 milliEquivalent(s) Oral once    MEDICATIONS  (PRN):  acetaminophen     Tablet .. 650 milliGRAM(s) Oral every 6 hours PRN Temp greater or equal to 38C (100.4F), Mild Pain (1 - 3)    Vital Signs Last 24 Hrs  T(F): 97.8 (06 Jul 2022 05:00), Max: 98.8 (05 Jul 2022 13:56)  HR: 76 (06 Jul 2022 05:00) (76 - 95)  BP: 131/78 (06 Jul 2022 05:00) (114/72 - 151/99)  RR: 15 (06 Jul 2022 05:00) (15 - 17)  SpO2: 95% (06 Jul 2022 05:00) (95% - 95%)  I&O's Summary    05 Jul 2022 07:01  -  06 Jul 2022 07:00  --------------------------------------------------------  IN: 0 mL / OUT: 1425 mL / NET: -1425 mL      BMI (kg/m2): 25.3 (07-04-22 @ 20:17)  PHYSICAL EXAM:  General: NAD, A/O x 3, elderly  ENT: MMM, no thrush  Neck: Supple, No JVD  Lungs: Non labored breathing,  Slight decreased breath sounds bases bilaterally   Cardio: RRR, S1/S2, No murmurs, 1+ LE edema (improving)  Abdomen: Soft, Nontender, Nondistended; Bowel sounds present  Extremities: No calf tenderness, moves all extremities    LABS:                        13.2   5.40  )-----------( 179      ( 05 Jul 2022 07:15 )             40.1       07-06    144  |  106  |  31  ----------------------------<  110  3.5   |  31  |  1.15    Ca    8.3      06 Jul 2022 06:06  Mg     2.0     07-06    TPro  6.7  /  Alb  3.1  /  TBili  0.4  /  DBili  x   /  AST  7   /  ALT  14  /  AlkPhos  58  07-04       PT/INR - ( 04 Jul 2022 16:37 )   PT: 12.4 sec;   INR: 1.07 ratio              CARDIAC MARKERS ( 06 Jul 2022 06:06 )  x     / 161.5 ng/L / x     / x     / x      CARDIAC MARKERS ( 05 Jul 2022 07:15 )  x     / 235.3 ng/L / x     / x     / x      CARDIAC MARKERS ( 04 Jul 2022 17:55 )  x     / 285.3 ng/L / x     / x     / x      CARDIAC MARKERS ( 04 Jul 2022 16:37 )  x     / 294.9 ng/L / x     / x     / x            TSH 1.653   TSH with FT4 reflex --  Total T3 --                          RADIOLOGY & ADDITIONAL TESTS:    Care Discussed with Consultants/Other Providers:    Patient is a 88y old  Female who presents with a chief complaint of sob (05 Jul 2022 15:24)  Eager to go home  Willing to stay for stress test     Patient seen and examined at bedside.    ALLERGIES:  No Known Allergies    MEDICATIONS  (STANDING):  aspirin enteric coated 81 milliGRAM(s) Oral daily  furosemide   Injectable 20 milliGRAM(s) IV Push two times a day  losartan 50 milliGRAM(s) Oral daily  metoprolol tartrate 12.5 milliGRAM(s) Oral two times a day  potassium chloride    Tablet ER 40 milliEquivalent(s) Oral once    MEDICATIONS  (PRN):  acetaminophen     Tablet .. 650 milliGRAM(s) Oral every 6 hours PRN Temp greater or equal to 38C (100.4F), Mild Pain (1 - 3)    Vital Signs Last 24 Hrs  T(F): 97.8 (06 Jul 2022 05:00), Max: 98.8 (05 Jul 2022 13:56)  HR: 76 (06 Jul 2022 05:00) (76 - 95)  BP: 131/78 (06 Jul 2022 05:00) (114/72 - 151/99)  RR: 15 (06 Jul 2022 05:00) (15 - 17)  SpO2: 95% (06 Jul 2022 05:00) (95% - 95%)  I&O's Summary    05 Jul 2022 07:01  -  06 Jul 2022 07:00  --------------------------------------------------------  IN: 0 mL / OUT: 1425 mL / NET: -1425 mL      BMI (kg/m2): 25.3 (07-04-22 @ 20:17)    PHYSICAL EXAM:  General: NAD, elderly  Head: NC/AT  ENT: MMM, no thrush  Neck: Supple, No JVD  Lungs: Non labored breathing, Slight decreased breath sounds bases bilaterally, no wheezes or rales  Cardio: RRR, S1/S2, No murmurs, 1+ LE edema (improving)  Abdomen: Soft, Nontender, Nondistended; Bowel sounds present  Extremities: No calf tenderness, moves all extremities  Psych: A/O x 3, calm and cooperative with exam    LABS:                        13.2   5.40  )-----------( 179      ( 05 Jul 2022 07:15 )             40.1       07-06    144  |  106  |  31  ----------------------------<  110  3.5   |  31  |  1.15    Ca    8.3      06 Jul 2022 06:06  Mg     2.0     07-06    TPro  6.7  /  Alb  3.1  /  TBili  0.4  /  DBili  x   /  AST  7   /  ALT  14  /  AlkPhos  58  07-04       PT/INR - ( 04 Jul 2022 16:37 )   PT: 12.4 sec;   INR: 1.07 ratio              CARDIAC MARKERS ( 06 Jul 2022 06:06 )  x     / 161.5 ng/L / x     / x     / x      CARDIAC MARKERS ( 05 Jul 2022 07:15 )  x     / 235.3 ng/L / x     / x     / x      CARDIAC MARKERS ( 04 Jul 2022 17:55 )  x     / 285.3 ng/L / x     / x     / x      CARDIAC MARKERS ( 04 Jul 2022 16:37 )  x     / 294.9 ng/L / x     / x     / x            TSH 1.653   TSH with FT4 reflex --  Total T3 --                          RADIOLOGY & ADDITIONAL TESTS:    Care Discussed with Consultants/Other Providers:

## 2022-07-06 NOTE — PROGRESS NOTE ADULT - SUBJECTIVE AND OBJECTIVE BOX
NEIL SIDDIQINCO  [id number]      Chief Complaint: New HFrEF    Interval History: The patient reports breathing is improving.     Tele: sinus rhythm 80s BPM      Current meds:   acetaminophen     Tablet .. 650 milliGRAM(s) Oral every 6 hours PRN  aspirin enteric coated 81 milliGRAM(s) Oral daily  furosemide   Injectable 20 milliGRAM(s) IV Push two times a day  losartan 50 milliGRAM(s) Oral daily  metoprolol tartrate 12.5 milliGRAM(s) Oral two times a day  potassium chloride    Tablet ER 40 milliEquivalent(s) Oral once      Objective:     Vital Signs:   T(C): 36.6 (07-06-22 @ 05:00), Max: 37.1 (07-05-22 @ 13:56)  HR: 76 (07-06-22 @ 05:00) (76 - 95)  BP: 131/78 (07-06-22 @ 05:00) (114/72 - 151/99)  RR: 15 (07-06-22 @ 05:00) (15 - 17)  SpO2: 95% (07-06-22 @ 05:00) (95% - 95%)  Wt(kg): --    PHYSICAL EXAM:  General: elderly woman, no acute distress  HEENT: sclera anicteric  Neck: supple  CVS: JVP ~ 9 cm H20, RRR, s1, s2  Chest: unlabored respirations, decreased breath sounds   Extremities: no lower extremity edema b/l  Neuro: awake, alert & oriented x 3  Psych: normal affect    Labs:   06 Jul 2022 06:06    144    |  106    |  31     ----------------------------<  110    3.5     |  31     |  1.15     Ca    8.3        06 Jul 2022 06:06  Mg     2.0       06 Jul 2022 06:06    TPro  6.7    /  Alb  3.1    /  TBili  0.4    /  DBili  x      /  AST  7      /  ALT  14     /  AlkPhos  58     04 Jul 2022 16:37                          13.2   5.40  )-----------( 179      ( 05 Jul 2022 07:15 )             40.1     PT/INR - ( 04 Jul 2022 16:37 )   PT: 12.4 sec;   INR: 1.07 ratio                   ECG (7/4/22): sinus tachycardia, PVC, nonspecific ST abnormalities       TTE (7/5/22):   1. Moderately decreased global left ventricular systolic function.   2. Left ventricular ejection fraction, by visual estimation, is 40%.   3. Moderate global left ventricle hypokinesis.   4. Normal left ventricular internal cavity size.   5. The right ventricle is not well visualized, appears to have normal   systolic function.   6. The left atrium is normal in size.   7. Mildly enlarged right atrium.   8. Mild thickening of the anterior and posterior mitral valve leaflets.   9. Mild mitral annular calcification.  10. Moderate mitral valve regurgitation.  11. Mild tricuspid regurgitation.  12. Mild aortic valve leaflet calcification. No aortic valve stenosis.  13. Mild to moderate aortic regurgitation.  14. Mildly dilated proximal ascending aorta (3.5 cm).  15. Moderate pleural effusion in the left lateral region.  16. There is no evidence of pericardial effusion.    No prior cardiac workup in chart NEIL SIDDIQINCO  [id number]      Chief Complaint: New HFrEF    Interval History: The patient reports breathing is improving.     Tele: sinus rhythm 80s BPM      Current meds:   acetaminophen     Tablet .. 650 milliGRAM(s) Oral every 6 hours PRN  aspirin enteric coated 81 milliGRAM(s) Oral daily  furosemide   Injectable 20 milliGRAM(s) IV Push two times a day  losartan 50 milliGRAM(s) Oral daily  metoprolol tartrate 12.5 milliGRAM(s) Oral two times a day  potassium chloride    Tablet ER 40 milliEquivalent(s) Oral once      Objective:     Vital Signs:   T(C): 36.6 (07-06-22 @ 05:00), Max: 37.1 (07-05-22 @ 13:56)  HR: 76 (07-06-22 @ 05:00) (76 - 95)  BP: 131/78 (07-06-22 @ 05:00) (114/72 - 151/99)  RR: 15 (07-06-22 @ 05:00) (15 - 17)  SpO2: 95% (07-06-22 @ 05:00) (95% - 95%)  Wt(kg): --    PHYSICAL EXAM:  General: elderly woman, no acute distress  HEENT: sclera anicteric  Neck: supple  CVS: JVP ~ 9 cm H20, RRR, s1, s2  Chest: unlabored respirations, decreased breath sounds   Extremities: no lower extremity edema b/l  Neuro: awake, alert & oriented x 3  Psych: normal affect    Labs:   06 Jul 2022 06:06    144    |  106    |  31     ----------------------------<  110    3.5     |  31     |  1.15     Ca    8.3        06 Jul 2022 06:06  Mg     2.0       06 Jul 2022 06:06    TPro  6.7    /  Alb  3.1    /  TBili  0.4    /  DBili  x      /  AST  7      /  ALT  14     /  AlkPhos  58     04 Jul 2022 16:37                          13.2   5.40  )-----------( 179      ( 05 Jul 2022 07:15 )             40.1     PT/INR - ( 04 Jul 2022 16:37 )   PT: 12.4 sec;   INR: 1.07 ratio             ECG (7/4/22): sinus tachycardia, PVC, nonspecific ST abnormalities       TTE (7/5/22):   1. Moderately decreased global left ventricular systolic function.   2. Left ventricular ejection fraction, by visual estimation, is 40%.   3. Moderate global left ventricle hypokinesis.   4. Normal left ventricular internal cavity size.   5. The right ventricle is not well visualized, appears to have normal   systolic function.   6. The left atrium is normal in size.   7. Mildly enlarged right atrium.   8. Mild thickening of the anterior and posterior mitral valve leaflets.   9. Mild mitral annular calcification.  10. Moderate mitral valve regurgitation.  11. Mild tricuspid regurgitation.  12. Mild aortic valve leaflet calcification. No aortic valve stenosis.  13. Mild to moderate aortic regurgitation.  14. Mildly dilated proximal ascending aorta (3.5 cm).  15. Moderate pleural effusion in the left lateral region.  16. There is no evidence of pericardial effusion.    No prior cardiac workup in chart

## 2022-07-06 NOTE — PROGRESS NOTE ADULT - ASSESSMENT
87yo female with hx of HTN, chronic back pain, presented to the ED with complaints of progressively worsening sob x a few weeks to possibly months associated with LE edema, concerning for new onset acute systolic CHF.    #Acute respiratory failure w/ hypoxia  #LE Edema  #HTN  -due to systolic HF  -Continue Lasix 20mg IV BID  -TTE with EF 40%  - Started low dose lopressor, continue ARB  -Monitor daily weights + I/Os--weight from 70.6kg yesterday to 69.3 kg  -DASH diet w/ fluid restriction  -Cardiology consulted appreciated--suspect stress test Friday     #Elevated Troponin   -Likely demand ischemia  -Trend Troponin-downtrending--can stop trending  -Continue ASA  -Cardiology following   -Monitor tele: unremarkable     #Hypokalemia:  improved  K 3.5 today--goal K > 4      7/6: Updated Juanjose ramirez and Nino, at bedside  Dispo: stress test when euvolemic

## 2022-07-06 NOTE — PROGRESS NOTE ADULT - ASSESSMENT
Assessment:  Justina Mclaughlin is an 88 year old woman with past medical history of Hypertension and chronic back pain who presents with progressive dyspnea on exertion and lower extremity swelling, found to have signs suggestive of congestive heart failure.    ECG is consistent with sinus tachycardia and nonspecific ST abnormalities. Troponins elevated which may be demand ischemia from volume overload, cannot rule out underlying CAD. Pro BNP significantly elevated.    Recommendations:  [] Systolic heart failure: Echo consistent with     Congestive heart failure: Continue Lasix IV as dosed. Follow up echo to evaluate LVEF and valve function. Continue to monitor on telemetry. Monitor renal function and urine output. Replete K. Patient also recommended to avoid high sodium foods. Continue home Losartan  [] Elevated troponin: Will plan for nuclear stress test to evaluate for ischemic heart disease when euvolemic, likely by Friday pending hospital course. Given age, sons are concerned about possible complications with coronary angiogram and so they favor a more conservative approach. Continue Aspirin.    We will continue to follow along.    Ranjeet Juarez MD  Cardiology          Assessment:  Justina Mclaughlin is an 88 year old woman with past medical history of Hypertension and chronic back pain who presents with progressive dyspnea on exertion and lower extremity swelling, found to have signs suggestive of congestive heart failure.    ECG is consistent with sinus tachycardia and nonspecific ST abnormalities. Troponins elevated which may be demand ischemia from volume overload, cannot rule out underlying CAD. Pro BNP significantly elevated.    Recommendations:  [] Systolic heart failure: Echo consistent with moderately reduced LVEF ~ 40%.  Continue Lasix IV as dosed, likely transition to Lasix 40 mg PO daily in 1-2 days. Patient already on ARB, can consider switch to Entresto pending renal function. Transition Metoprolol to succinate 25 mg daily.  Continue to monitor on telemetry. Monitor renal function and urine output. Patient also recommended to avoid high sodium foods.  [] Elevated troponin: Will plan for nuclear stress test to evaluate for ischemic heart disease when euvolemic, likely by Friday pending hospital course. Given age, sons are concerned about possible complications with coronary angiogram and so they favor a more conservative approach. Continue Aspirin. Family to be updated about reduced LVEF and ideally coronary angiogram would be best to evaluate for ischemic cardiomyopathy, but they seem to want more conservative approach and patient as well.     Will sign out to cardiology team to follow along tomorrow.    Ranjeet Juarez MD  Cardiology          Assessment:  Justina Mclaughlin is an 88 year old woman with past medical history of Hypertension and chronic back pain who presents with progressive dyspnea on exertion and lower extremity swelling, found to have signs suggestive of congestive heart failure.    ECG is consistent with sinus tachycardia and nonspecific ST abnormalities. Troponins elevated which may be demand ischemia from volume overload, cannot rule out underlying CAD. Pro BNP significantly elevated.    Recommendations:  [] Systolic heart failure: Echo consistent with moderately reduced LVEF ~ 40%.  Continue Lasix IV as dosed, likely transition to Lasix 40 mg PO daily in 1-2 days. Patient already on ARB, can consider switch to Entresto pending renal function. Transition Metoprolol to succinate 25 mg daily.  Continue to monitor on telemetry. Monitor renal function and urine output. Patient also recommended to avoid high sodium foods.  [] Elevated troponin: Will plan for nuclear stress test to evaluate for ischemic heart disease when euvolemic, likely by Friday pending hospital course. Given age, sons are concerned about possible complications with coronary angiogram and so they favor a more conservative approach. Continue Aspirin. Family to be updated about reduced LVEF and ideally coronary angiogram would be best to evaluate for ischemic cardiomyopathy, but they seem to want more conservative approach and patient as well.     Addendum (7/7/22):   Discussed with son/HCP Nino ([phone]) about new diagnosis of HFrEF and recommendation for ischemic workup. He prefers nuclear stress test instead of coronary angiogram due to the risks with invasive procedures. Consider pharmacologic nuclear stress test on Friday.     Will sign out to cardiology team to follow along.    Ranjeet Juarez MD  Cardiology

## 2022-07-07 ENCOUNTER — APPOINTMENT (OUTPATIENT)
Dept: INTERNAL MEDICINE | Facility: CLINIC | Age: 87
End: 2022-07-07

## 2022-07-07 DIAGNOSIS — I10 ESSENTIAL (PRIMARY) HYPERTENSION: ICD-10-CM

## 2022-07-07 DIAGNOSIS — E87.6 HYPOKALEMIA: ICD-10-CM

## 2022-07-07 DIAGNOSIS — R77.8 OTHER SPECIFIED ABNORMALITIES OF PLASMA PROTEINS: ICD-10-CM

## 2022-07-07 DIAGNOSIS — R60.0 LOCALIZED EDEMA: ICD-10-CM

## 2022-07-07 DIAGNOSIS — J96.01 ACUTE RESPIRATORY FAILURE WITH HYPOXIA: ICD-10-CM

## 2022-07-07 DIAGNOSIS — I50.21 ACUTE SYSTOLIC (CONGESTIVE) HEART FAILURE: ICD-10-CM

## 2022-07-07 LAB
ANION GAP SERPL CALC-SCNC: 12 MMOL/L — SIGNIFICANT CHANGE UP (ref 5–17)
APPEARANCE UR: CLEAR — SIGNIFICANT CHANGE UP
BACTERIA # UR AUTO: NEGATIVE /HPF — SIGNIFICANT CHANGE UP
BILIRUB UR-MCNC: NEGATIVE — SIGNIFICANT CHANGE UP
BUN SERPL-MCNC: 33 MG/DL — HIGH (ref 7–23)
CALCIUM SERPL-MCNC: 8.5 MG/DL — SIGNIFICANT CHANGE UP (ref 8.4–10.5)
CHLORIDE SERPL-SCNC: 106 MMOL/L — SIGNIFICANT CHANGE UP (ref 96–108)
CO2 SERPL-SCNC: 26 MMOL/L — SIGNIFICANT CHANGE UP (ref 22–31)
COLOR SPEC: YELLOW — SIGNIFICANT CHANGE UP
CREAT SERPL-MCNC: 1.18 MG/DL — SIGNIFICANT CHANGE UP (ref 0.5–1.3)
DIFF PNL FLD: ABNORMAL
EGFR: 45 ML/MIN/1.73M2 — LOW
EPI CELLS # UR: SIGNIFICANT CHANGE UP
GLUCOSE SERPL-MCNC: 99 MG/DL — SIGNIFICANT CHANGE UP (ref 70–99)
GLUCOSE UR QL: NEGATIVE — SIGNIFICANT CHANGE UP
HCT VFR BLD CALC: 41.9 % — SIGNIFICANT CHANGE UP (ref 34.5–45)
HGB BLD-MCNC: 13.5 G/DL — SIGNIFICANT CHANGE UP (ref 11.5–15.5)
KETONES UR-MCNC: NEGATIVE — SIGNIFICANT CHANGE UP
LEUKOCYTE ESTERASE UR-ACNC: NEGATIVE — SIGNIFICANT CHANGE UP
MAGNESIUM SERPL-MCNC: 2.1 MG/DL — SIGNIFICANT CHANGE UP (ref 1.6–2.6)
MCHC RBC-ENTMCNC: 30.1 PG — SIGNIFICANT CHANGE UP (ref 27–34)
MCHC RBC-ENTMCNC: 32.2 GM/DL — SIGNIFICANT CHANGE UP (ref 32–36)
MCV RBC AUTO: 93.3 FL — SIGNIFICANT CHANGE UP (ref 80–100)
NITRITE UR-MCNC: NEGATIVE — SIGNIFICANT CHANGE UP
NRBC # BLD: 0 /100 WBCS — SIGNIFICANT CHANGE UP (ref 0–0)
PH UR: 6 — SIGNIFICANT CHANGE UP (ref 5–8)
PLATELET # BLD AUTO: 173 K/UL — SIGNIFICANT CHANGE UP (ref 150–400)
POTASSIUM SERPL-MCNC: 3.6 MMOL/L — SIGNIFICANT CHANGE UP (ref 3.5–5.3)
POTASSIUM SERPL-SCNC: 3.6 MMOL/L — SIGNIFICANT CHANGE UP (ref 3.5–5.3)
PROT UR-MCNC: NEGATIVE — SIGNIFICANT CHANGE UP
RBC # BLD: 4.49 M/UL — SIGNIFICANT CHANGE UP (ref 3.8–5.2)
RBC # FLD: 15.3 % — HIGH (ref 10.3–14.5)
RBC CASTS # UR COMP ASSIST: ABNORMAL /HPF (ref 0–4)
SODIUM SERPL-SCNC: 144 MMOL/L — SIGNIFICANT CHANGE UP (ref 135–145)
SP GR SPEC: 1.01 — SIGNIFICANT CHANGE UP (ref 1.01–1.02)
UROBILINOGEN FLD QL: NEGATIVE — SIGNIFICANT CHANGE UP
WBC # BLD: 5.52 K/UL — SIGNIFICANT CHANGE UP (ref 3.8–10.5)
WBC # FLD AUTO: 5.52 K/UL — SIGNIFICANT CHANGE UP (ref 3.8–10.5)
WBC UR QL: SIGNIFICANT CHANGE UP /HPF (ref 0–5)

## 2022-07-07 PROCEDURE — 99232 SBSQ HOSP IP/OBS MODERATE 35: CPT

## 2022-07-07 PROCEDURE — 99233 SBSQ HOSP IP/OBS HIGH 50: CPT

## 2022-07-07 RX ORDER — LOSARTAN POTASSIUM 100 MG/1
1 TABLET, FILM COATED ORAL
Qty: 30 | Refills: 0
Start: 2022-07-07 | End: 2022-08-05

## 2022-07-07 RX ORDER — METOPROLOL TARTRATE 50 MG
25 TABLET ORAL DAILY
Refills: 0 | Status: DISCONTINUED | OUTPATIENT
Start: 2022-07-08 | End: 2022-07-11

## 2022-07-07 RX ORDER — ASPIRIN/CALCIUM CARB/MAGNESIUM 324 MG
1 TABLET ORAL
Qty: 60 | Refills: 0
Start: 2022-07-07 | End: 2022-08-05

## 2022-07-07 RX ORDER — REGADENOSON 0.08 MG/ML
0.4 INJECTION, SOLUTION INTRAVENOUS ONCE
Refills: 0 | Status: COMPLETED | OUTPATIENT
Start: 2022-07-07 | End: 2022-07-08

## 2022-07-07 RX ADMIN — Medication 81 MILLIGRAM(S): at 12:02

## 2022-07-07 RX ADMIN — Medication 20 MILLIGRAM(S): at 13:54

## 2022-07-07 RX ADMIN — LOSARTAN POTASSIUM 50 MILLIGRAM(S): 100 TABLET, FILM COATED ORAL at 05:36

## 2022-07-07 RX ADMIN — Medication 20 MILLIGRAM(S): at 05:37

## 2022-07-07 RX ADMIN — Medication 12.5 MILLIGRAM(S): at 17:37

## 2022-07-07 NOTE — PROGRESS NOTE ADULT - SUBJECTIVE AND OBJECTIVE BOX
Follow up for chf  SUBJ: Patient feeling well. no cp no sob  PMH  HTN (hypertension)        MEDICATIONS  (STANDING):  aspirin enteric coated 81 milliGRAM(s) Oral daily  furosemide   Injectable 20 milliGRAM(s) IV Push two times a day  losartan 50 milliGRAM(s) Oral daily  metoprolol tartrate 12.5 milliGRAM(s) Oral two times a day  regadenoson Injectable 0.4 milliGRAM(s) IV Push once    MEDICATIONS  (PRN):  acetaminophen     Tablet .. 650 milliGRAM(s) Oral every 6 hours PRN Temp greater or equal to 38C (100.4F), Mild Pain (1 - 3)        PHYSICAL EXAM:  Vital Signs Last 24 Hrs  T(C): 36.5 (07 Jul 2022 12:07), Max: 36.8 (07 Jul 2022 05:33)  T(F): 97.7 (07 Jul 2022 12:07), Max: 98.2 (07 Jul 2022 05:33)  HR: 99 (07 Jul 2022 12:07) (77 - 99)  BP: 106/68 (07 Jul 2022 12:07) (106/68 - 133/98)  BP(mean): --  RR: 16 (07 Jul 2022 12:07) (16 - 16)  SpO2: 94% (07 Jul 2022 12:07) (93% - 95%)    Parameters below as of 07 Jul 2022 12:07  Patient On (Oxygen Delivery Method): room air        GENERAL: NAD, well-groomed, well-developed  HEAD:  Atraumatic, Normocephalic  EYES: EOMI, PERRLA, conjunctiva and sclera clear  ENT: Moist mucous membranes,  NECK: Supple, No JVD, no bruits  CHEST/LUNG: Clear to percussion bilaterally; No rales, rhonchi, wheezing, or rubs  HEART: Regular rate and rhythm; No murmurs, rubs, or gallops PMI non displaced.  ABDOMEN: Soft, Nontender, Nondistended; Bowel sounds present  EXTREMITIES:  2+ Peripheral Pulses, No clubbing, cyanosis, or edema  SKIN: No rashes or lesions  NERVOUS SYSTEM:  Alert & Oriented X3, Good concentration; Motor Strength 5/5 B/L upper and lower extremities; DTRs 2+ intact and symmetric      TELEMETRY:sinus with apc's        LABS:                        13.5   5.52  )-----------( 173      ( 07 Jul 2022 06:20 )             41.9     07-07    144  |  106  |  33<H>  ----------------------------<  99  3.6   |  26  |  1.18    Ca    8.5      07 Jul 2022 06:20  Mg     2.1     07-07              I&O's Summary    BNP    RADIOLOGY & ADDITIONAL STUDIES:    ECHO:

## 2022-07-07 NOTE — CHART NOTE - NSCHARTNOTEFT_GEN_A_CORE
Reviewed prior progress notes, labs and imaging.    Discussed with Dr Riley    Primary Diagnosis: SOB  87yo female with hx of HTN, chronic back pain, presented to the ED with complaints of progressively worsening sob x a few weeks to possibly months associated with LE edema, concerning for new onset acute systolic CHF.    #Acute respiratory failure w/ hypoxia  #LE Edema  #HTN  -due to systolic HF  -Continue Lasix 20mg IV BID  -TTE with EF 40%  -Continue low dose lopressor, continue ARB  -Monitor daily weights + I/Os--weight   -DASH diet w/ fluid restriction  -Cardiology consulted appreciated--suspect stress test Friday     #Elevated Troponin   -Likely demand ischemia  -Trend Troponin-downtrending--can stop trending  -Continue ASA  -Cardiology following   -Monitor tele: unremarkable     #Hypokalemia:  improved  K 3.5 today--goal K > 4      7/6: Updated Juanjose ramirez and Nino, at bedside  Dispo: stress test when euvolemic         Plan: stress test vs cardiac cath likely friday   Anticipated Discharge: when work up completed and cleared by cardio

## 2022-07-07 NOTE — PROGRESS NOTE ADULT - SUBJECTIVE AND OBJECTIVE BOX
Patient is a 88y old  Female who presents with a chief complaint of sob (2022 10:41)      Patient seen and examined at bedside.    ALLERGIES:  No Known Allergies    MEDICATIONS  (STANDING):  aspirin enteric coated 81 milliGRAM(s) Oral daily  furosemide   Injectable 20 milliGRAM(s) IV Push two times a day  losartan 50 milliGRAM(s) Oral daily  metoprolol tartrate 12.5 milliGRAM(s) Oral two times a day    MEDICATIONS  (PRN):  acetaminophen     Tablet .. 650 milliGRAM(s) Oral every 6 hours PRN Temp greater or equal to 38C (100.4F), Mild Pain (1 - 3)    Vital Signs Last 24 Hrs  T(F): 98.2 (2022 05:33), Max: 98.2 (2022 05:33)  HR: 77 (2022 05:33) (77 - 97)  BP: 113/79 (2022 05:33) (105/66 - 133/98)  RR: 16 (2022 05:33) (15 - 16)  SpO2: 93% (2022 05:33) (93% - 95%)  I&O's Summary    PHYSICAL EXAM:  General: NAD, A/O x 3  ENT: MMM  Neck: Supple, No JVD  Lungs: Clear to auscultation bilaterally, Non labored breathing   Cardio: RRR, S1/S2, No murmurs  Abdomen: Soft, Nontender, Nondistended; Bowel sounds present  Extremities: No calf tenderness, No pitting edema    LABS:                        13.5   5.52  )-----------( 173      ( 2022 06:20 )             41.9     07-07    144  |  106  |  33  ----------------------------<  99  3.6   |  26  |  1.18    Ca    8.5      2022 06:20  Mg     2.1     07-07    TPro  6.7  /  Alb  3.1  /  TBili  0.4  /  DBili  x   /  AST  7   /  ALT  14  /  AlkPhos  58  07-04      PT/INR - ( 2022 16:37 )   PT: 12.4 sec;   INR: 1.07 ratio             CARDIAC MARKERS ( 2022 06:06 )  x     / 161.5 ng/L / x     / x     / x      CARDIAC MARKERS ( 2022 07:15 )  x     / 235.3 ng/L / x     / x     / x      CARDIAC MARKERS ( 2022 17:55 )  x     / 285.3 ng/L / x     / x     / x      CARDIAC MARKERS ( 2022 16:37 )  x     / 294.9 ng/L / x     / x     / x            TSH 1.653   TSH with FT4 reflex --  Total T3 --                  Urinalysis Basic - ( 2022 02:50 )    Color: Yellow / Appearance: Clear / S.015 / pH: x  Gluc: x / Ketone: Negative  / Bili: Negative / Urobili: Negative   Blood: x / Protein: Negative / Nitrite: Negative   Leuk Esterase: Negative / RBC: 5-10 /HPF / WBC 0-2 /HPF   Sq Epi: x / Non Sq Epi: Neg.-Few / Bacteria: Negative /HPF          RADIOLOGY & ADDITIONAL TESTS:    Care Discussed with Consultants/Other Providers:    Patient is a 88y old  Female who presents with a chief complaint of sob (2022 10:41)      Patient seen and examined at bedside.  Pt without complaints doing well sitting up in a chair.  Pt denies SOB or CP     ALLERGIES:  No Known Allergies    MEDICATIONS  (STANDING):  aspirin enteric coated 81 milliGRAM(s) Oral daily  furosemide   Injectable 20 milliGRAM(s) IV Push two times a day  losartan 50 milliGRAM(s) Oral daily  metoprolol tartrate 12.5 milliGRAM(s) Oral two times a day    MEDICATIONS  (PRN):  acetaminophen     Tablet .. 650 milliGRAM(s) Oral every 6 hours PRN Temp greater or equal to 38C (100.4F), Mild Pain (1 - 3)    Vital Signs Last 24 Hrs  T(F): 98.2 (2022 05:33), Max: 98.2 (2022 05:33)  HR: 77 (2022 05:33) (77 - 97)  BP: 113/79 (2022 05:33) (105/66 - 133/98)  RR: 16 (2022 05:33) (15 - 16)  SpO2: 93% (2022 05:33) (93% - 95%)  I&O's Summary    PHYSICAL EXAM:  General:87 y/o female in  NAD, A/O x 3 forgetful   ENT: MMM  Neck: Supple, No JVD  Lungs: Clear to auscultation bilaterally, Non labored breathing   Cardio: RRR, S1/S2, No murmurs  Abdomen: Soft, Nontender, Nondistended; Bowel sounds present  Extremities: No calf tenderness, No pitting edema    LABS:                        13.5   5.52  )-----------( 173      ( 2022 06:20 )             41.9     07-07    144  |  106  |  33  ----------------------------<  99  3.6   |  26  |  1.18    Ca    8.5      2022 06:20  Mg     2.1     07-07    TPro  6.7  /  Alb  3.1  /  TBili  0.4  /  DBili  x   /  AST  7   /  ALT  14  /  AlkPhos  58  07-04      PT/INR - ( 2022 16:37 )   PT: 12.4 sec;   INR: 1.07 ratio             CARDIAC MARKERS ( 2022 06:06 )  x     / 161.5 ng/L / x     / x     / x      CARDIAC MARKERS ( 2022 07:15 )  x     / 235.3 ng/L / x     / x     / x      CARDIAC MARKERS ( 2022 17:55 )  x     / 285.3 ng/L / x     / x     / x      CARDIAC MARKERS ( 2022 16:37 )  x     / 294.9 ng/L / x     / x     / x            TSH 1.653   TSH with FT4 reflex --  Total T3 --                  Urinalysis Basic - ( 2022 02:50 )    Color: Yellow / Appearance: Clear / S.015 / pH: x  Gluc: x / Ketone: Negative  / Bili: Negative / Urobili: Negative   Blood: x / Protein: Negative / Nitrite: Negative   Leuk Esterase: Negative / RBC: 5-10 /HPF / WBC 0-2 /HPF   Sq Epi: x / Non Sq Epi: Neg.-Few / Bacteria: Negative /HPF          RADIOLOGY & ADDITIONAL TESTS:    Care Discussed with Consultants/Other Providers:

## 2022-07-07 NOTE — PROGRESS NOTE ADULT - ASSESSMENT
imp    chf with reduced ef with downtrending troponins    suggest    change metoprolol tartrate to succinate 25 mg    for vmpi in am

## 2022-07-07 NOTE — PROGRESS NOTE ADULT - ASSESSMENT
87yo female with hx of HTN, chronic back pain, presented to the ED with complaints of progressively worsening sob x a few weeks to possibly months associated with LE edema, concerning for new onset acute systolic CHF.    #Acute respiratory failure w/ hypoxia  #LE Edema  #HTN  -due to systolic HF  -Continue Lasix 20mg IV BID  -TTE with EF 40%  - Started low dose lopressor, continue ARB  -Monitor daily weights + I/Os--weight from 70.6kg 7/5- to 65.8 today  -DASH diet w/ fluid restriction  -Cardiology consulted appreciated--suspect stress test Friday     #Elevated Troponin   -Likely demand ischemia  -Trops  294.9-285.3-235.3-161.5-- trending down  -Continue ASA  -Cardiology rec appreciated   -Monitor tele: unremarkable overnight  -Nuclear stress test today     #Hypokalemia:  improved  K 3.6 today--goal K > 4      7/7: Updated james  Dispo: stress test today    87yo female with hx of HTN, chronic back pain, presented to the ED with complaints of progressively worsening sob x a few weeks to possibly months associated with LE edema, concerning for new onset acute systolic CHF.    #Acute respiratory failure w/ hypoxia  #LE Edema  #HTN  -due to systolic HF  -Continue Lasix 20mg IV BID  -TTE with EF 40%  - continue low dose  lopressor, continue ARB  -Monitor daily weights + I/Os--weight from 70.6kg 7/5- to 65.8 today ( continues to decrease)  -DASH diet w/ fluid restriction  -Cardiology consulted appreciated--stress test Friday     #Elevated Troponin   -Likely demand ischemia  -Trops  294.9-285.3-235.3-161.5-- trending down  -Continue ASA  -Cardiology following --  rec appreciated   -Monitor tele: unremarkable overnight  -Nuclear stress test tomorrow     #Hypokalemia:  improved  K 3.6 today--goal K > 4    Pt evaluation - ordered       7/7: Updated sons about plan of care and pts condition   Dispo: stress test friday

## 2022-07-07 NOTE — PROGRESS NOTE ADULT - NS ATTEND AMEND GEN_ALL_CORE FT
New Acute systolic heart failure  - EF 40%  - for stress test today  - NPO  - suspect will need cardiac cath  - continue diuresis

## 2022-07-08 ENCOUNTER — TRANSCRIPTION ENCOUNTER (OUTPATIENT)
Age: 87
End: 2022-07-08

## 2022-07-08 LAB
ANION GAP SERPL CALC-SCNC: 10 MMOL/L — SIGNIFICANT CHANGE UP (ref 5–17)
BUN SERPL-MCNC: 34 MG/DL — HIGH (ref 7–23)
CALCIUM SERPL-MCNC: 8.6 MG/DL — SIGNIFICANT CHANGE UP (ref 8.4–10.5)
CHLORIDE SERPL-SCNC: 105 MMOL/L — SIGNIFICANT CHANGE UP (ref 96–108)
CO2 SERPL-SCNC: 28 MMOL/L — SIGNIFICANT CHANGE UP (ref 22–31)
CREAT SERPL-MCNC: 1.16 MG/DL — SIGNIFICANT CHANGE UP (ref 0.5–1.3)
EGFR: 46 ML/MIN/1.73M2 — LOW
GLUCOSE SERPL-MCNC: 92 MG/DL — SIGNIFICANT CHANGE UP (ref 70–99)
HCT VFR BLD CALC: 41.2 % — SIGNIFICANT CHANGE UP (ref 34.5–45)
HGB BLD-MCNC: 13.5 G/DL — SIGNIFICANT CHANGE UP (ref 11.5–15.5)
MAGNESIUM SERPL-MCNC: 2.2 MG/DL — SIGNIFICANT CHANGE UP (ref 1.6–2.6)
MCHC RBC-ENTMCNC: 29.9 PG — SIGNIFICANT CHANGE UP (ref 27–34)
MCHC RBC-ENTMCNC: 32.8 GM/DL — SIGNIFICANT CHANGE UP (ref 32–36)
MCV RBC AUTO: 91.4 FL — SIGNIFICANT CHANGE UP (ref 80–100)
NRBC # BLD: 0 /100 WBCS — SIGNIFICANT CHANGE UP (ref 0–0)
PLATELET # BLD AUTO: 164 K/UL — SIGNIFICANT CHANGE UP (ref 150–400)
POTASSIUM SERPL-MCNC: 3.6 MMOL/L — SIGNIFICANT CHANGE UP (ref 3.5–5.3)
POTASSIUM SERPL-SCNC: 3.6 MMOL/L — SIGNIFICANT CHANGE UP (ref 3.5–5.3)
RBC # BLD: 4.51 M/UL — SIGNIFICANT CHANGE UP (ref 3.8–5.2)
RBC # FLD: 15.4 % — HIGH (ref 10.3–14.5)
SODIUM SERPL-SCNC: 143 MMOL/L — SIGNIFICANT CHANGE UP (ref 135–145)
WBC # BLD: 5.62 K/UL — SIGNIFICANT CHANGE UP (ref 3.8–10.5)
WBC # FLD AUTO: 5.62 K/UL — SIGNIFICANT CHANGE UP (ref 3.8–10.5)

## 2022-07-08 PROCEDURE — 93016 CV STRESS TEST SUPVJ ONLY: CPT

## 2022-07-08 PROCEDURE — 93018 CV STRESS TEST I&R ONLY: CPT

## 2022-07-08 PROCEDURE — 78452 HT MUSCLE IMAGE SPECT MULT: CPT | Mod: 26

## 2022-07-08 PROCEDURE — 99232 SBSQ HOSP IP/OBS MODERATE 35: CPT

## 2022-07-08 PROCEDURE — 99233 SBSQ HOSP IP/OBS HIGH 50: CPT | Mod: 25

## 2022-07-08 RX ORDER — METOPROLOL TARTRATE 50 MG
1 TABLET ORAL
Qty: 0 | Refills: 0 | DISCHARGE
Start: 2022-07-08

## 2022-07-08 RX ORDER — FUROSEMIDE 40 MG
20 TABLET ORAL
Qty: 0 | Refills: 0 | DISCHARGE
Start: 2022-07-08

## 2022-07-08 RX ADMIN — REGADENOSON 0.4 MILLIGRAM(S): 0.08 INJECTION, SOLUTION INTRAVENOUS at 11:50

## 2022-07-08 RX ADMIN — Medication 81 MILLIGRAM(S): at 14:54

## 2022-07-08 RX ADMIN — Medication 20 MILLIGRAM(S): at 17:09

## 2022-07-08 RX ADMIN — LOSARTAN POTASSIUM 50 MILLIGRAM(S): 100 TABLET, FILM COATED ORAL at 05:25

## 2022-07-08 RX ADMIN — Medication 20 MILLIGRAM(S): at 05:26

## 2022-07-08 NOTE — DISCHARGE NOTE NURSING/CASE MANAGEMENT/SOCIAL WORK - PATIENT PORTAL LINK FT
You can access the FollowMyHealth Patient Portal offered by [facility] by registering at the following website: [email or web address]. By joining [facility]’s FollowMyHealth portal, you will also be able to view your health information using other applications (apps) compatible with our system.

## 2022-07-08 NOTE — DISCHARGE NOTE NURSING/CASE MANAGEMENT/SOCIAL WORK - NSDCPEFALRISK_GEN_ALL_CORE
For information on Fall & Injury Prevention, visit: [email or web address] OR  [email or web address] OR  https://www.cdc.gov/steadi/patient.html

## 2022-07-08 NOTE — PROGRESS NOTE ADULT - SUBJECTIVE AND OBJECTIVE BOX
Patient is a 88y old  Female who presents with a chief complaint of sob (2022 16:56)      Patient seen and examined at bedside.    ALLERGIES:  No Known Allergies    MEDICATIONS  (STANDING):  aspirin enteric coated 81 milliGRAM(s) Oral daily  furosemide   Injectable 20 milliGRAM(s) IV Push two times a day  losartan 50 milliGRAM(s) Oral daily  metoprolol succinate ER 25 milliGRAM(s) Oral daily  regadenoson Injectable 0.4 milliGRAM(s) IV Push once    MEDICATIONS  (PRN):  acetaminophen     Tablet .. 650 milliGRAM(s) Oral every 6 hours PRN Temp greater or equal to 38C (100.4F), Mild Pain (1 - 3)    Vital Signs Last 24 Hrs  T(F): 98 (2022 05:18), Max: 98 (2022 05:18)  HR: 76 (2022 05:18) (71 - 99)  BP: 129/83 (2022 05:18) (106/68 - 129/83)  RR: 16 (2022 05:18) (15 - 16)  SpO2: 95% (2022 05:18) (94% - 95%)  I&O's Summary    PHYSICAL EXAM:  General: NAD, A/O x 3  ENT: MMM  Neck: Supple, No JVD  Lungs: Clear to auscultation bilaterally, Non labored breathing   Cardio: RRR, S1/S2, No murmurs  Abdomen: Soft, Nontender, Nondistended; Bowel sounds present  Extremities: No calf tenderness, No pitting edema    LABS:                        13.5   5.62  )-----------( 164      ( 2022 06:11 )             41.2     07-08    143  |  105  |  34  ----------------------------<  92  3.6   |  28  |  1.16    Ca    8.6      2022 06:11  Mg     2.2     07-08            CARDIAC MARKERS ( 2022 06:06 )  x     / 161.5 ng/L / x     / x     / x      CARDIAC MARKERS ( 2022 07:15 )  x     / 235.3 ng/L / x     / x     / x                            Urinalysis Basic - ( 2022 02:50 )    Color: Yellow / Appearance: Clear / S.015 / pH: x  Gluc: x / Ketone: Negative  / Bili: Negative / Urobili: Negative   Blood: x / Protein: Negative / Nitrite: Negative   Leuk Esterase: Negative / RBC: 5-10 /HPF / WBC 0-2 /HPF   Sq Epi: x / Non Sq Epi: Neg.-Few / Bacteria: Negative /HPF          RADIOLOGY & ADDITIONAL TESTS:    Care Discussed with Consultants/Other Providers:    Patient is a 88y old  Female who presents with a chief complaint of sob (2022 16:56)      Patient seen and examined at bedside.  Pt without complaints , slept well last pm , No CP, no SOB    ALLERGIES:  No Known Allergies    MEDICATIONS  (STANDING):  aspirin enteric coated 81 milliGRAM(s) Oral daily  furosemide   Injectable 20 milliGRAM(s) IV Push two times a day  losartan 50 milliGRAM(s) Oral daily  metoprolol succinate ER 25 milliGRAM(s) Oral daily  regadenoson Injectable 0.4 milliGRAM(s) IV Push once    MEDICATIONS  (PRN):  acetaminophen     Tablet .. 650 milliGRAM(s) Oral every 6 hours PRN Temp greater or equal to 38C (100.4F), Mild Pain (1 - 3)    Vital Signs Last 24 Hrs  T(F): 98 (2022 05:18), Max: 98 (2022 05:18)  HR: 76 (2022 05:18) (71 - 99)  BP: 129/83 (2022 05:18) (106/68 - 129/83)  RR: 16 (2022 05:18) (15 - 16)  SpO2: 95% (2022 05:18) (94% - 95%)  I&O's Summary    PHYSICAL EXAM:  General: 87 y/o female in  NAD, A/O x 3  ENT: MMM  Neck: Supple, No JVD  Lungs: Clear to auscultation bilaterally, Non labored breathing   Cardio: RRR, S1/S2, No murmurs  Abdomen: Soft, Nontender, Nondistended; Bowel sounds present  Extremities: No calf tenderness, No pitting edema    LABS:                        13.5   5.62  )-----------( 164      ( 2022 06:11 )             41.2     07-08    143  |  105  |  34  ----------------------------<  92  3.6   |  28  |  1.16    Ca    8.6      2022 06:11  Mg     2.2     07-08            CARDIAC MARKERS ( 2022 06:06 )  x     / 161.5 ng/L / x     / x     / x      CARDIAC MARKERS ( 2022 07:15 )  x     / 235.3 ng/L / x     / x     / x                            Urinalysis Basic - ( 2022 02:50 )    Color: Yellow / Appearance: Clear / S.015 / pH: x  Gluc: x / Ketone: Negative  / Bili: Negative / Urobili: Negative   Blood: x / Protein: Negative / Nitrite: Negative   Leuk Esterase: Negative / RBC: 5-10 /HPF / WBC 0-2 /HPF   Sq Epi: x / Non Sq Epi: Neg.-Few / Bacteria: Negative /HPF          RADIOLOGY & ADDITIONAL TESTS:    Care Discussed with Consultants/Other Providers:    Patient is a 88y old  Female who presents with a chief complaint of sob (2022 16:56)      Patient seen and examined at bedside.  Pt without complaints , slept well last pm , No CP, no SOB    ALLERGIES:  No Known Allergies    MEDICATIONS  (STANDING):  aspirin enteric coated 81 milliGRAM(s) Oral daily  furosemide   Injectable 20 milliGRAM(s) IV Push two times a day  losartan 50 milliGRAM(s) Oral daily  metoprolol succinate ER 25 milliGRAM(s) Oral daily  regadenoson Injectable 0.4 milliGRAM(s) IV Push once    MEDICATIONS  (PRN):  acetaminophen     Tablet .. 650 milliGRAM(s) Oral every 6 hours PRN Temp greater or equal to 38C (100.4F), Mild Pain (1 - 3)    Vital Signs Last 24 Hrs  T(F): 98 (2022 05:18), Max: 98 (2022 05:18)  HR: 76 (2022 05:18) (71 - 99)  BP: 129/83 (2022 05:18) (106/68 - 129/83)  RR: 16 (2022 05:18) (15 - 16)  SpO2: 95% (2022 05:18) (94% - 95%)  I&O's Summary    PHYSICAL EXAM:  General: 89 y/o female in  NAD, A/O x 3  ENT: MMM  Neck: Supple, No JVD  Lungs: Clear to auscultation bilaterally, Non labored breathing   Cardio: RRR, S1/S2, No murmurs  Abdomen: Soft, Nontender, Nondistended; Bowel sounds present  Extremities: No calf tenderness, No pitting edema    LABS:                        13.5   5.62  )-----------( 164      ( 2022 06:11 )             41.2     07-08    143  |  105  |  34  ----------------------------<  92  3.6   |  28  |  1.16    Ca    8.6      2022 06:11  Mg     2.2     07-08    CARDIAC MARKERS ( 2022 06:06 )  x     / 161.5 ng/L / x     / x     / x      CARDIAC MARKERS ( 2022 07:15 )  x     / 235.3 ng/L / x     / x     / x        Urinalysis Basic - ( 2022 02:50 )    Color: Yellow / Appearance: Clear / S.015 / pH: x  Gluc: x / Ketone: Negative  / Bili: Negative / Urobili: Negative   Blood: x / Protein: Negative / Nitrite: Negative   Leuk Esterase: Negative / RBC: 5-10 /HPF / WBC 0-2 /HPF   Sq Epi: x / Non Sq Epi: Neg.-Few / Bacteria: Negative /HPF    RADIOLOGY & ADDITIONAL TESTS:    Care Discussed with Consultants/Other Providers:

## 2022-07-08 NOTE — PROGRESS NOTE ADULT - ASSESSMENT
87yo female with hx of HTN, chronic back pain, presented to the ED with complaints of progressively worsening sob x a few weeks to possibly months associated with LE edema, concerning for new onset acute systolic CHF.    #Acute respiratory failure w/ hypoxia- improved   #LE Edema- resolved   #HTN  -due to systolic HF  -Continue Lasix 20mg IV BID  -TTE with EF 40%  -changed to metoprolol rebecca. 25mg daily to start this am , continue ARB  -Monitor daily weights + I/Os--weight from 70.6kg 7/5- to 68 today   -DASH diet w/ fluid restriction  -Cardiology consulted appreciated--stress test Friday     #Elevated Troponin   -Likely demand ischemia  -Trops  294.9-285.3-235.3-161.5-- trending down  -Continue ASA  -Cardiology following --  rec appreciated   -Monitor tele: unremarkable overnight  -Nuclear stress test tomorrow     #Hypokalemia:  improved  K 3.6 today--goal K > 4    Pt evaluation - ordered       7/8: Updated sons about plan of care and pts condition   Dispo: stress test today than [facility] if negative  87yo female with hx of HTN, chronic back pain, presented to the ED with complaints of progressively worsening sob x a few weeks to possibly months associated with LE edema, concerning for new onset acute systolic CHF.    #Acute respiratory failure w/ hypoxia- improved   #LE Edema- resolved   #HTN  -due to systolic HF  -Continue  Lasix 20mg IV, change to oral on DC   -TTE with EF 40%  -changed to metoprolol rebecca. 25mg daily to start this am , continue ARB  -Monitor daily weights + I/Os--weight from 70.6kg 7/5- to 68 today   -DASH diet w/ fluid restriction  -Cardiology consulted appreciated--stress test today     #Elevated Troponin   -Likely demand ischemia  -Trops  294.9-285.3-235.3-161.5-- trending down  -Continue ASA  -Cardiology following --  rec appreciated   -Monitor tele: unremarkable overnight  -Nuclear stress test today     #Hypokalemia:  improved  K 3.6 today--goal K > 4    Pt evaluation - ordered       7/8: Updated sons about plan of care and pts condition   Dispo: stress test today than [facility] if negative

## 2022-07-08 NOTE — PROGRESS NOTE ADULT - NS ATTEND AMEND GEN_ALL_CORE FT
New onset systolic heart failure  - EF 40%  - needs [facility]  - awaiting transfer to [place]

## 2022-07-08 NOTE — PROGRESS NOTE ADULT - SUBJECTIVE AND OBJECTIVE BOX
Follow up for chf  SUBJ: Patient feeling well offers no complaints  PMH  HTN (hypertension)        MEDICATIONS  (STANDING):  aspirin enteric coated 81 milliGRAM(s) Oral daily  furosemide   Injectable 20 milliGRAM(s) IV Push two times a day  losartan 50 milliGRAM(s) Oral daily  metoprolol succinate ER 25 milliGRAM(s) Oral daily  regadenoson Injectable 0.4 milliGRAM(s) IV Push once    MEDICATIONS  (PRN):  acetaminophen     Tablet .. 650 milliGRAM(s) Oral every 6 hours PRN Temp greater or equal to 38C (100.4F), Mild Pain (1 - 3)        PHYSICAL EXAM:  Vital Signs Last 24 Hrs  T(C): 36.7 (08 Jul 2022 05:18), Max: 36.7 (08 Jul 2022 05:18)  T(F): 98 (08 Jul 2022 05:18), Max: 98 (08 Jul 2022 05:18)  HR: 76 (08 Jul 2022 05:18) (71 - 76)  BP: 129/83 (08 Jul 2022 05:18) (113/64 - 129/83)  BP(mean): --  RR: 16 (08 Jul 2022 05:18) (15 - 16)  SpO2: 95% (08 Jul 2022 05:18) (95% - 95%)    Parameters below as of 08 Jul 2022 05:18  Patient On (Oxygen Delivery Method): room air        GENERAL: NAD, well-groomed, well-developed  HEAD:  Atraumatic, Normocephalic  EYES: EOMI, PERRLA, conjunctiva and sclera clear  ENT: Moist mucous membranes,  NECK: Supple, No JVD, no bruits  CHEST/LUNG: Clear to percussion bilaterally; No rales, rhonchi, wheezing, or rubs  HEART: Regular rate and rhythm; No murmurs, rubs, or gallops PMI non displaced.  ABDOMEN: Soft, Nontender, Nondistended; Bowel sounds present  EXTREMITIES:  2+ Peripheral Pulses, No clubbing, cyanosis, or edema  SKIN: No rashes or lesions  NERVOUS SYSTEM:  Alert & Oriented X3, Good concentration; Motor Strength 5/5 B/L upper and lower extremities; DTRs 2+ intact and symmetric      TELEMETRY:rsr    ECG:    LABS:                        13.5   5.62  )-----------( 164      ( 08 Jul 2022 06:11 )             41.2     07-08    143  |  105  |  34<H>  ----------------------------<  92  3.6   |  28  |  1.16    Ca    8.6      08 Jul 2022 06:11  Mg     2.2     07-08              I&O's Summary    BNP    RADIOLOGY & ADDITIONAL STUDIES:    ECHO:

## 2022-07-09 LAB
MAGNESIUM SERPL-MCNC: 2.3 MG/DL — SIGNIFICANT CHANGE UP (ref 1.6–2.6)
SARS-COV-2 RNA SPEC QL NAA+PROBE: SIGNIFICANT CHANGE UP

## 2022-07-09 PROCEDURE — 99231 SBSQ HOSP IP/OBS SF/LOW 25: CPT

## 2022-07-09 PROCEDURE — 99232 SBSQ HOSP IP/OBS MODERATE 35: CPT

## 2022-07-09 RX ADMIN — Medication 25 MILLIGRAM(S): at 05:02

## 2022-07-09 RX ADMIN — LOSARTAN POTASSIUM 50 MILLIGRAM(S): 100 TABLET, FILM COATED ORAL at 05:02

## 2022-07-09 RX ADMIN — Medication 81 MILLIGRAM(S): at 12:01

## 2022-07-09 RX ADMIN — Medication 20 MILLIGRAM(S): at 14:31

## 2022-07-09 RX ADMIN — Medication 20 MILLIGRAM(S): at 05:02

## 2022-07-09 NOTE — PROGRESS NOTE ADULT - SUBJECTIVE AND OBJECTIVE BOX
Patient is a 88y old  Female who presents with a chief complaint of sob (2022 12:21)    Patient seen and examined at bedside.  no acute overnight events    ALLERGIES:  No Known Allergies        Vital Signs Last 24 Hrs  T(F): 97.8 (2022 04:52), Max: 98 (2022 20:34)  HR: 67 (2022 04:52) (67 - 74)  BP: 131/81 (2022 04:52) (110/75 - 131/81)  RR: 20 (2022 04:52) (15 - 20)  SpO2: 95% (2022 04:52) (92% - 96%)  I&O's Summary    MEDICATIONS:  acetaminophen     Tablet .. 650 milliGRAM(s) Oral every 6 hours PRN  aspirin enteric coated 81 milliGRAM(s) Oral daily  furosemide   Injectable 20 milliGRAM(s) IV Push two times a day  losartan 50 milliGRAM(s) Oral daily  metoprolol succinate ER 25 milliGRAM(s) Oral daily      PHYSICAL EXAM:  General: NAD, A/O x 3  ENT: MMM, no thrush  Neck: Supple, No JVD  Lungs: Clear to auscultation bilaterally, non labored  Cardio: S1S2 regular  Abdomen: Soft, Nontender  Extremities: No cyanosis, No edema    LABS:                        13.5   5.62  )-----------( 164      ( 2022 06:11 )             41.2     07-08    143  |  105  |  34  ----------------------------<  92  3.6   |  28  |  1.16    Ca    8.6      2022 06:11  Mg     2.3     07-09                                  Urinalysis Basic - ( 2022 02:50 )    Color: Yellow / Appearance: Clear / S.015 / pH: x  Gluc: x / Ketone: Negative  / Bili: Negative / Urobili: Negative   Blood: x / Protein: Negative / Nitrite: Negative   Leuk Esterase: Negative / RBC: 5-10 /HPF / WBC 0-2 /HPF   Sq Epi: x / Non Sq Epi: Neg.-Few / Bacteria: Negative /HPF        COVID-19 PCR: NotDetec (22 @ 16:25)      RADIOLOGY & ADDITIONAL TESTS:    Care Discussed with Consultants/Other Providers:

## 2022-07-09 NOTE — PROGRESS NOTE ADULT - ASSESSMENT
89yo female with hx of HTN, chronic back pain, presented to the ED with complaints of progressively worsening sob x a few weeks to possibly months associated with LE edema, concerning for new onset acute systolic CHF.    # Probable NSTEMI  # CHF with reduced EF  # Elevated Tn  cardiology following  TTE with EF 40%  stress test abnormal with mod sized reversible perfusion defect  continue lasix 20 IV BID daily, change to oral on DC  continue BB, ARB, monitor daily weights, I/Os  DASH diet with fluid restriction  plan for Cardiac Cath, Transfer to [facility]  continue ASA therapy, continue to monitor on tele    # Hypokalemia  replete as needed  monitor electrolytes  follow BMP    called and provided an update for pt family member Nino Lissette [phone] regarding plan of care, all in agreement

## 2022-07-09 NOTE — PROGRESS NOTE ADULT - SUBJECTIVE AND OBJECTIVE BOX
Follow up for  SUBJ:    comfortable no cardiac complaints    PMH  HTN (hypertension)        MEDICATIONS  (STANDING):  aspirin enteric coated 81 milliGRAM(s) Oral daily  furosemide   Injectable 20 milliGRAM(s) IV Push two times a day  losartan 50 milliGRAM(s) Oral daily  metoprolol succinate ER 25 milliGRAM(s) Oral daily    MEDICATIONS  (PRN):  acetaminophen     Tablet .. 650 milliGRAM(s) Oral every 6 hours PRN Temp greater or equal to 38C (100.4F), Mild Pain (1 - 3)        PHYSICAL EXAM:  Vital Signs Last 24 Hrs  T(C): 36.4 (09 Jul 2022 12:28), Max: 36.7 (08 Jul 2022 20:34)  T(F): 97.6 (09 Jul 2022 12:28), Max: 98 (08 Jul 2022 20:34)  HR: 85 (09 Jul 2022 14:30) (66 - 85)  BP: 116/70 (09 Jul 2022 14:30) (113/67 - 131/81)  BP(mean): --  RR: 18 (09 Jul 2022 12:28) (15 - 20)  SpO2: 98% (09 Jul 2022 12:28) (95% - 98%)    Parameters below as of 09 Jul 2022 12:28  Patient On (Oxygen Delivery Method): room air        GENERAL: NAD, well-groomed, well-developed  HEAD:  Atraumatic, Normocephalic  EYES: EOMI, PERRLA, conjunctiva and sclera clear  ENT: Moist mucous membranes,  NECK: Supple, No JVD, no bruits  CHEST/LUNG: Clear to percussion bilaterally; No rales, rhonchi, wheezing, or rubs  HEART: Regular rate and rhythm; No murmurs, rubs, or gallops PMI non displaced.  ABDOMEN: Soft, Nontender, Nondistended; Bowel sounds present  EXTREMITIES:  2+ Peripheral Pulses, No clubbing, cyanosis, or edema  SKIN: No rashes or lesions  NERVOUS SYSTEM:  Cranial Nerves II-XII intact      TELEMETRY:        ECG:< from: 12 Lead ECG (07.04.22 @ 16:49) >  Diagnosis Line Sinus tachycardiawith occasional premature ventricular complexes  Minimal voltage criteria for LVH, may be normal variant  Possible incorrect electrode placement lead V6   Delayed R wave progression         Confirmed by Nishant Juarez ([id number]) on 7/5/2022 8:14:57 AM    < end of copied text >    ECHO:    LABS:                        13.5   5.62  )-----------( 164      ( 08 Jul 2022 06:11 )             41.2     07-08    143  |  105  |  34<H>  ----------------------------<  92  3.6   |  28  |  1.16    Ca    8.6      08 Jul 2022 06:11  Mg     2.3     07-09      I&O's Summary    BNP    RADIOLOGY & ADDITIONAL STUDIES:    < from: Xray Chest 1 View- PORTABLE-Urgent (Xray Chest 1 View- PORTABLE-Urgent .) (07.04.22 @ 17:14) >    INTERPRETATION:  HISTORY: ;  sob;  TECHNIQUE: Portable frontal view of the chest, 1 view.  COMPARISON: 4/18/2022.  FINDINGS/  IMPRESSION:    HEART:  Enlarged  LUNGS: Prominent interstitial markings with basilar infiltrates and   effusions, right greater than left. Likely represents congestive heart   failure. Correlate clinically for superimposed pneumonia  BONES: degenerative changes      --- End of Report ---      NICK MEJIA MD; Attending Interventional Radiologist  This document has been electronically signed. Jul 6 2022  7:20PM    < end of copied text >      ECHO:    discussion  discussed pros and cons of cardiac cath wit family at bedside there are in agreement and anxious for transfer.

## 2022-07-10 LAB
ANION GAP SERPL CALC-SCNC: 9 MMOL/L — SIGNIFICANT CHANGE UP (ref 5–17)
BUN SERPL-MCNC: 39 MG/DL — HIGH (ref 7–23)
CALCIUM SERPL-MCNC: 9.1 MG/DL — SIGNIFICANT CHANGE UP (ref 8.4–10.5)
CHLORIDE SERPL-SCNC: 102 MMOL/L — SIGNIFICANT CHANGE UP (ref 96–108)
CO2 SERPL-SCNC: 30 MMOL/L — SIGNIFICANT CHANGE UP (ref 22–31)
CREAT SERPL-MCNC: 1.35 MG/DL — HIGH (ref 0.5–1.3)
EGFR: 38 ML/MIN/1.73M2 — LOW
GLUCOSE SERPL-MCNC: 112 MG/DL — HIGH (ref 70–99)
HCT VFR BLD CALC: 46 % — HIGH (ref 34.5–45)
HGB BLD-MCNC: 14.8 G/DL — SIGNIFICANT CHANGE UP (ref 11.5–15.5)
MAGNESIUM SERPL-MCNC: 2.3 MG/DL — SIGNIFICANT CHANGE UP (ref 1.6–2.6)
MCHC RBC-ENTMCNC: 29.7 PG — SIGNIFICANT CHANGE UP (ref 27–34)
MCHC RBC-ENTMCNC: 32.2 GM/DL — SIGNIFICANT CHANGE UP (ref 32–36)
MCV RBC AUTO: 92.2 FL — SIGNIFICANT CHANGE UP (ref 80–100)
NRBC # BLD: 0 /100 WBCS — SIGNIFICANT CHANGE UP (ref 0–0)
PLATELET # BLD AUTO: 238 K/UL — SIGNIFICANT CHANGE UP (ref 150–400)
POTASSIUM SERPL-MCNC: 3.4 MMOL/L — LOW (ref 3.5–5.3)
POTASSIUM SERPL-SCNC: 3.4 MMOL/L — LOW (ref 3.5–5.3)
RBC # BLD: 4.99 M/UL — SIGNIFICANT CHANGE UP (ref 3.8–5.2)
RBC # FLD: 14.7 % — HIGH (ref 10.3–14.5)
SODIUM SERPL-SCNC: 141 MMOL/L — SIGNIFICANT CHANGE UP (ref 135–145)
WBC # BLD: 5.25 K/UL — SIGNIFICANT CHANGE UP (ref 3.8–10.5)
WBC # FLD AUTO: 5.25 K/UL — SIGNIFICANT CHANGE UP (ref 3.8–10.5)

## 2022-07-10 PROCEDURE — 99232 SBSQ HOSP IP/OBS MODERATE 35: CPT

## 2022-07-10 RX ORDER — POTASSIUM CHLORIDE 20 MEQ
40 PACKET (EA) ORAL ONCE
Refills: 0 | Status: COMPLETED | OUTPATIENT
Start: 2022-07-10 | End: 2022-07-10

## 2022-07-10 RX ADMIN — Medication 20 MILLIGRAM(S): at 05:07

## 2022-07-10 RX ADMIN — Medication 20 MILLIGRAM(S): at 14:42

## 2022-07-10 RX ADMIN — Medication 25 MILLIGRAM(S): at 05:07

## 2022-07-10 RX ADMIN — Medication 40 MILLIEQUIVALENT(S): at 11:07

## 2022-07-10 RX ADMIN — Medication 81 MILLIGRAM(S): at 11:08

## 2022-07-10 RX ADMIN — LOSARTAN POTASSIUM 50 MILLIGRAM(S): 100 TABLET, FILM COATED ORAL at 05:07

## 2022-07-10 NOTE — PROGRESS NOTE ADULT - ASSESSMENT
89yo female with hx of HTN, chronic back pain, presented to the ED with complaints of progressively worsening sob x a few weeks to possibly months associated with LE edema, concerning for new onset acute systolic CHF.    # Probable NSTEMI  # CHF with reduced EF  # Elevated Tn  cardiology following  TTE with EF 40%  stress test abnormal with mod sized reversible perfusion defect  continue lasix 20 IV BID daily, change to oral on DC  continue BB, ARB, monitor daily weights, I/Os  DASH diet with fluid restriction  plan for Cardiac Cath, Transfer to [facility] on Mon 7/11  continue ASA therapy, continue to monitor on tele    # Hypokalemia  replete as needed  monitor electrolytes  follow BMP    called and provided an update for pt family member Nino Lissette [phone] regarding plan of care, all in agreement     87yo female with hx of HTN, chronic back pain, presented to the ED with complaints of progressively worsening sob x a few weeks to possibly months associated with LE edema, concerning for new onset acute systolic CHF.    # Probable NSTEMI  New Acute systolic heart failure  # Elevated Tn  cardiology following  TTE with EF 40%  stress test abnormal with mod sized reversible perfusion defect  continue lasix 20 IV BID daily, change to oral on DC  continue BB, ARB, monitor daily weights, I/Os  DASH diet with fluid restriction  plan for Cardiac Cath, Transfer to [facility] on Mon 7/11  continue ASA therapy, continue to monitor on tele    # Hypokalemia  replete as needed  monitor electrolytes  follow BMP    called and provided an update for pt family member Nino Lissette [phone] regarding plan of care, all in agreement

## 2022-07-10 NOTE — PROGRESS NOTE ADULT - SUBJECTIVE AND OBJECTIVE BOX
Patient is a 88y old  Female who presents with a chief complaint of sob (09 Jul 2022 17:20)    Patient seen and examined at bedside.  no acute events overnight    ALLERGIES:  No Known Allergies        Vital Signs Last 24 Hrs  T(F): 97.6 (10 Jul 2022 05:00), Max: 97.7 (09 Jul 2022 21:17)  HR: 67 (10 Jul 2022 05:00) (66 - 85)  BP: 143/87 (10 Jul 2022 05:00) (107/68 - 143/87)  RR: 20 (10 Jul 2022 05:00) (18 - 20)  SpO2: 96% (10 Jul 2022 05:00) (95% - 98%)  I&O's Summary    MEDICATIONS:  acetaminophen     Tablet .. 650 milliGRAM(s) Oral every 6 hours PRN  aspirin enteric coated 81 milliGRAM(s) Oral daily  furosemide   Injectable 20 milliGRAM(s) IV Push two times a day  losartan 50 milliGRAM(s) Oral daily  metoprolol succinate ER 25 milliGRAM(s) Oral daily  potassium chloride   Powder 40 milliEquivalent(s) Oral once      PHYSICAL EXAM:  General: NAD, A/O x 3  ENT: MMM, no thrush  Neck: Supple, No JVD  Lungs: Clear to auscultation bilaterally, non labored  Cardio: S1S2 regular  Abdomen: Soft, Nontender  Extremities: No cyanosis, No edema    LABS:                        14.8   5.25  )-----------( 238      ( 10 Jul 2022 06:30 )             46.0     07-10    141  |  102  |  39  ----------------------------<  112  3.4   |  30  |  1.35    Ca    9.1      10 Jul 2022 06:30  Mg     2.3     07-10                                      COVID-19 PCR: Stefani (07-08-22 @ 16:25)      RADIOLOGY & ADDITIONAL TESTS:    Care Discussed with Consultants/Other Providers:    Patient is a 88y old  Female who presents with a chief complaint of sob (09 Jul 2022 17:20)    Patient seen and examined at bedside.  no acute events overnight    ALLERGIES:  No Known Allergies    Vital Signs Last 24 Hrs  T(F): 97.6 (10 Jul 2022 05:00), Max: 97.7 (09 Jul 2022 21:17)  HR: 67 (10 Jul 2022 05:00) (66 - 85)  BP: 143/87 (10 Jul 2022 05:00) (107/68 - 143/87)  RR: 20 (10 Jul 2022 05:00) (18 - 20)  SpO2: 96% (10 Jul 2022 05:00) (95% - 98%)  I&O's Summary    MEDICATIONS:  acetaminophen     Tablet .. 650 milliGRAM(s) Oral every 6 hours PRN  aspirin enteric coated 81 milliGRAM(s) Oral daily  furosemide   Injectable 20 milliGRAM(s) IV Push two times a day  losartan 50 milliGRAM(s) Oral daily  metoprolol succinate ER 25 milliGRAM(s) Oral daily  potassium chloride   Powder 40 milliEquivalent(s) Oral once    PHYSICAL EXAM:  General: NAD, A/O x 3  ENT: MMM, no thrush  Neck: Supple, No JVD  Lungs: Clear to auscultation bilaterally, non labored  Cardio: S1S2 regular  Abdomen: Soft, Nontender  Extremities: No cyanosis, No edema    LABS:             14.8   5.25  )-----------( 238      ( 10 Jul 2022 06:30 )             46.0     07-10  141  |  102  |  39  ----------------------------<  112  3.4   |  30  |  1.35    Ca    9.1      10 Jul 2022 06:30  Mg     2.3     07-10    COVID-19 PCR: Stefani (07-08-22 @ 16:25)      RADIOLOGY & ADDITIONAL TESTS:    Care Discussed with Consultants/Other Providers:

## 2022-07-10 NOTE — PROGRESS NOTE ADULT - NS ATTEND AMEND GEN_ALL_CORE FT
Awaiting transfer to [facility] for left heart catheter given new systolic heart failure

## 2022-07-11 ENCOUNTER — INPATIENT (INPATIENT)
Facility: HOSPITAL | Age: 87
LOS: 1 days | Discharge: HOME HEALTH | DRG: 286 | End: 2022-07-13
Attending: INTERNAL MEDICINE | Admitting: HOSPITALIST
Payer: MEDICARE

## 2022-07-11 VITALS — OXYGEN SATURATION: 88 % | RESPIRATION RATE: 17 BRPM

## 2022-07-11 VITALS
RESPIRATION RATE: 18 BRPM | OXYGEN SATURATION: 96 % | SYSTOLIC BLOOD PRESSURE: 141 MMHG | DIASTOLIC BLOOD PRESSURE: 84 MMHG | TEMPERATURE: 98 F | HEART RATE: 70 BPM

## 2022-07-11 DIAGNOSIS — Z29.9 ENCOUNTER FOR PROPHYLACTIC MEASURES, UNSPECIFIED: ICD-10-CM

## 2022-07-11 DIAGNOSIS — I51.9 HEART DISEASE, UNSPECIFIED: ICD-10-CM

## 2022-07-11 DIAGNOSIS — I25.10 ATHEROSCLEROTIC HEART DISEASE OF NATIVE CORONARY ARTERY WITHOUT ANGINA PECTORIS: ICD-10-CM

## 2022-07-11 DIAGNOSIS — I50.20 UNSPECIFIED SYSTOLIC (CONGESTIVE) HEART FAILURE: ICD-10-CM

## 2022-07-11 DIAGNOSIS — I10 ESSENTIAL (PRIMARY) HYPERTENSION: ICD-10-CM

## 2022-07-11 DIAGNOSIS — E89.0 POSTPROCEDURAL HYPOTHYROIDISM: Chronic | ICD-10-CM

## 2022-07-11 LAB
ANION GAP SERPL CALC-SCNC: 9 MMOL/L — SIGNIFICANT CHANGE UP (ref 5–17)
BUN SERPL-MCNC: 37 MG/DL — HIGH (ref 7–23)
CALCIUM SERPL-MCNC: 8.9 MG/DL — SIGNIFICANT CHANGE UP (ref 8.4–10.5)
CHLORIDE SERPL-SCNC: 103 MMOL/L — SIGNIFICANT CHANGE UP (ref 96–108)
CO2 SERPL-SCNC: 30 MMOL/L — SIGNIFICANT CHANGE UP (ref 22–31)
CREAT SERPL-MCNC: 1.3 MG/DL — SIGNIFICANT CHANGE UP (ref 0.5–1.3)
EGFR: 39 ML/MIN/1.73M2 — LOW
GLUCOSE SERPL-MCNC: 112 MG/DL — HIGH (ref 70–99)
HCT VFR BLD CALC: 43.1 % — SIGNIFICANT CHANGE UP (ref 34.5–45)
HGB BLD-MCNC: 13.8 G/DL — SIGNIFICANT CHANGE UP (ref 11.5–15.5)
MAGNESIUM SERPL-MCNC: 2.4 MG/DL — SIGNIFICANT CHANGE UP (ref 1.6–2.6)
MCHC RBC-ENTMCNC: 29.2 PG — SIGNIFICANT CHANGE UP (ref 27–34)
MCHC RBC-ENTMCNC: 32 GM/DL — SIGNIFICANT CHANGE UP (ref 32–36)
MCV RBC AUTO: 91.3 FL — SIGNIFICANT CHANGE UP (ref 80–100)
NRBC # BLD: 0 /100 WBCS — SIGNIFICANT CHANGE UP (ref 0–0)
PLATELET # BLD AUTO: 196 K/UL — SIGNIFICANT CHANGE UP (ref 150–400)
POTASSIUM SERPL-MCNC: 3.5 MMOL/L — SIGNIFICANT CHANGE UP (ref 3.5–5.3)
POTASSIUM SERPL-SCNC: 3.5 MMOL/L — SIGNIFICANT CHANGE UP (ref 3.5–5.3)
RBC # BLD: 4.72 M/UL — SIGNIFICANT CHANGE UP (ref 3.8–5.2)
RBC # FLD: 14.6 % — HIGH (ref 10.3–14.5)
SODIUM SERPL-SCNC: 142 MMOL/L — SIGNIFICANT CHANGE UP (ref 135–145)
WBC # BLD: 4.99 K/UL — SIGNIFICANT CHANGE UP (ref 3.8–10.5)
WBC # FLD AUTO: 4.99 K/UL — SIGNIFICANT CHANGE UP (ref 3.8–10.5)

## 2022-07-11 PROCEDURE — 80053 COMPREHEN METABOLIC PANEL: CPT

## 2022-07-11 PROCEDURE — 0225U NFCT DS DNA&RNA 21 SARSCOV2: CPT

## 2022-07-11 PROCEDURE — U0005: CPT

## 2022-07-11 PROCEDURE — 84443 ASSAY THYROID STIM HORMONE: CPT

## 2022-07-11 PROCEDURE — 85610 PROTHROMBIN TIME: CPT

## 2022-07-11 PROCEDURE — 99285 EMERGENCY DEPT VISIT HI MDM: CPT | Mod: 25

## 2022-07-11 PROCEDURE — U0003: CPT

## 2022-07-11 PROCEDURE — 85027 COMPLETE CBC AUTOMATED: CPT

## 2022-07-11 PROCEDURE — A9500: CPT

## 2022-07-11 PROCEDURE — 83880 ASSAY OF NATRIURETIC PEPTIDE: CPT

## 2022-07-11 PROCEDURE — 99222 1ST HOSP IP/OBS MODERATE 55: CPT

## 2022-07-11 PROCEDURE — 99232 SBSQ HOSP IP/OBS MODERATE 35: CPT

## 2022-07-11 PROCEDURE — 97162 PT EVAL MOD COMPLEX 30 MIN: CPT

## 2022-07-11 PROCEDURE — 85025 COMPLETE CBC W/AUTO DIFF WBC: CPT

## 2022-07-11 PROCEDURE — 93306 TTE W/DOPPLER COMPLETE: CPT

## 2022-07-11 PROCEDURE — 84484 ASSAY OF TROPONIN QUANT: CPT

## 2022-07-11 PROCEDURE — 93017 CV STRESS TEST TRACING ONLY: CPT

## 2022-07-11 PROCEDURE — 36415 COLL VENOUS BLD VENIPUNCTURE: CPT

## 2022-07-11 PROCEDURE — 93005 ELECTROCARDIOGRAM TRACING: CPT

## 2022-07-11 PROCEDURE — 78452 HT MUSCLE IMAGE SPECT MULT: CPT

## 2022-07-11 PROCEDURE — 83735 ASSAY OF MAGNESIUM: CPT

## 2022-07-11 PROCEDURE — 81001 URINALYSIS AUTO W/SCOPE: CPT

## 2022-07-11 PROCEDURE — 99222 1ST HOSP IP/OBS MODERATE 55: CPT | Mod: GC

## 2022-07-11 PROCEDURE — 80048 BASIC METABOLIC PNL TOTAL CA: CPT

## 2022-07-11 PROCEDURE — 71045 X-RAY EXAM CHEST 1 VIEW: CPT

## 2022-07-11 PROCEDURE — 99239 HOSP IP/OBS DSCHRG MGMT >30: CPT

## 2022-07-11 RX ORDER — FUROSEMIDE 40 MG
1 TABLET ORAL
Qty: 0 | Refills: 0 | DISCHARGE
Start: 2022-07-11

## 2022-07-11 RX ORDER — FUROSEMIDE 40 MG
40 TABLET ORAL DAILY
Refills: 0 | Status: DISCONTINUED | OUTPATIENT
Start: 2022-07-12 | End: 2022-07-13

## 2022-07-11 RX ORDER — LANOLIN ALCOHOL/MO/W.PET/CERES
3 CREAM (GRAM) TOPICAL AT BEDTIME
Refills: 0 | Status: DISCONTINUED | OUTPATIENT
Start: 2022-07-11 | End: 2022-07-13

## 2022-07-11 RX ORDER — LOSARTAN POTASSIUM 100 MG/1
50 TABLET, FILM COATED ORAL DAILY
Refills: 0 | Status: DISCONTINUED | OUTPATIENT
Start: 2022-07-11 | End: 2022-07-13

## 2022-07-11 RX ORDER — ATORVASTATIN CALCIUM 80 MG/1
80 TABLET, FILM COATED ORAL AT BEDTIME
Refills: 0 | Status: DISCONTINUED | OUTPATIENT
Start: 2022-07-11 | End: 2022-07-11

## 2022-07-11 RX ORDER — ASPIRIN/CALCIUM CARB/MAGNESIUM 324 MG
81 TABLET ORAL DAILY
Refills: 0 | Status: DISCONTINUED | OUTPATIENT
Start: 2022-07-12 | End: 2022-07-13

## 2022-07-11 RX ORDER — ACETAMINOPHEN 500 MG
650 TABLET ORAL EVERY 6 HOURS
Refills: 0 | Status: DISCONTINUED | OUTPATIENT
Start: 2022-07-11 | End: 2022-07-13

## 2022-07-11 RX ORDER — ATORVASTATIN CALCIUM 80 MG/1
80 TABLET, FILM COATED ORAL AT BEDTIME
Refills: 0 | Status: DISCONTINUED | OUTPATIENT
Start: 2022-07-12 | End: 2022-07-13

## 2022-07-11 RX ORDER — FUROSEMIDE 40 MG
40 TABLET ORAL DAILY
Refills: 0 | Status: DISCONTINUED | OUTPATIENT
Start: 2022-07-11 | End: 2022-07-11

## 2022-07-11 RX ORDER — METOPROLOL TARTRATE 50 MG
25 TABLET ORAL DAILY
Refills: 0 | Status: DISCONTINUED | OUTPATIENT
Start: 2022-07-12 | End: 2022-07-13

## 2022-07-11 RX ADMIN — Medication 81 MILLIGRAM(S): at 13:49

## 2022-07-11 RX ADMIN — Medication 20 MILLIGRAM(S): at 05:32

## 2022-07-11 RX ADMIN — Medication 25 MILLIGRAM(S): at 05:32

## 2022-07-11 RX ADMIN — LOSARTAN POTASSIUM 50 MILLIGRAM(S): 100 TABLET, FILM COATED ORAL at 05:32

## 2022-07-11 NOTE — PROGRESS NOTE ADULT - SUBJECTIVE AND OBJECTIVE BOX
Patient is a 88y old  Female who presents with a chief complaint of sob (10 Jul 2022 08:43)    Feels okay.  Denies chest pain, sob, nausea, vomiting.     Patient seen and examined at bedside. No overnight events reported.     ALLERGIES:  No Known Allergies    MEDICATIONS  (STANDING):  aspirin enteric coated 81 milliGRAM(s) Oral daily  furosemide   Injectable 20 milliGRAM(s) IV Push two times a day  losartan 50 milliGRAM(s) Oral daily  metoprolol succinate ER 25 milliGRAM(s) Oral daily    MEDICATIONS  (PRN):  acetaminophen     Tablet .. 650 milliGRAM(s) Oral every 6 hours PRN Temp greater or equal to 38C (100.4F), Mild Pain (1 - 3)    Vital Signs Last 24 Hrs  T(F): 97.6 (11 Jul 2022 05:20), Max: 98.2 (10 Jul 2022 14:03)  HR: 62 (11 Jul 2022 05:20) (62 - 79)  BP: 123/74 (11 Jul 2022 05:20) (110/58 - 124/72)  RR: 17 (11 Jul 2022 05:20) (17 - 18)  SpO2: 95% (11 Jul 2022 05:20) (93% - 96%)  I&O's Summary    PHYSICAL EXAM:  General: NAD, A/O x 3  ENT: No gross hearing impairment, Moist mucous membranes, no thrush  Neck: Supple, No JVD  Lungs: Clear to auscultation bilaterally, good air entry, non-labored breathing  Cardio: RRR, S1/S2, No murmur  Abdomen: Soft, Nontender, Nondistended; Bowel sounds present  Extremities: No calf tenderness, No cyanosis, No pitting edema  Psych: Appropriate mood and affect    LABS:                        13.8   4.99  )-----------( 196      ( 11 Jul 2022 06:30 )             43.1     07-10    141  |  102  |  39  ----------------------------<  112  3.4   |  30  |  1.35    Ca    9.1      10 Jul 2022 06:30  Mg     2.3     07-10    COVID-19 PCR: Stefani (07-08-22 @ 16:25)    RADIOLOGY & ADDITIONAL TESTS:    Care Discussed with Consultants/Other Providers:

## 2022-07-11 NOTE — PROGRESS NOTE ADULT - PROVIDER SPECIALTY LIST ADULT
Cardiology
Hospitalist
Hospitalist
Cardiology
Hospitalist
Cardiology

## 2022-07-11 NOTE — PROGRESS NOTE ADULT - ASSESSMENT
Assessment:  Justina Mclaughlin is an 88 year old woman with past medical history of Hypertension and chronic back pain who presents with progressive dyspnea on exertion and lower extremity swelling, found to have signs suggestive of congestive heart failure.    ECG is consistent with sinus tachycardia and nonspecific ST abnormalities. Troponins elevated which may be demand ischemia from volume overload, cannot rule out underlying CAD. Pro BNP significantly elevated.    Recommendations:  [] Systolic heart failure: Echo consistent with moderately reduced LVEF ~ 40%.  S/p IV lasix and appears euvolemic, transition IV lasix to Lasix 40 mg PO daily. Patient already on ARB, can consider switch to Entresto pending renal function. Continue Metoprolol succinate 25 mg daily. Patient also recommended to avoid high sodium foods.  [] Elevated troponin: Nuclear stress test abnormal and suggestive of ischemia in apical inferior and mid inferior territories, post stress LVEF 48% with global LV hypokinesis, patient awaiting coronary angiogram to evaluate for ischemic cardiomyopathy, patient agreeable for procedure. Plan for outpatient follow up in cardiology office in 1 week.     We will continue to follow along.     Ranjeet Juarez MD  Cardiology

## 2022-07-11 NOTE — CONSULT NOTE ADULT - SUBJECTIVE AND OBJECTIVE BOX
CARDIOLOGY FELLOW CONSULT NOTE    Consulting service: ED   Reason for consult:    HPI:   The pt is an 88F with HTN who initially presented to the [place] ED for progressively worsening dyspnea for weeks/months. At , the patient's cardiac evaluation showed the following: positive new HFrEF ~40% and a nuclear stress test  suggestive of apical inferior and mid inferior ischemia; the patient was diuresed with improvement in symptoms and was subsequently transferred to [facility] for a coronary angiogram. At the time of evaluation, the patient denies any chest pain or dyspnea.     PMHx:   HTN (hypertension)        PSHx:   No significant past surgical history    S/P partial thyroidectomy        Allergies:  No Known Allergies      Home Meds:    Current Medications:   acetaminophen     Tablet .. 650 milliGRAM(s) Oral every 6 hours PRN  losartan 50 milliGRAM(s) Oral daily  melatonin 3 milliGRAM(s) Oral at bedtime PRN      FAMILY HISTORY:  FH: stroke (Father)    FH: hypertension (Father)    FH: diabetes mellitus (Child)      REVIEW OF SYSTEMS:  CONSTITUTIONAL: No weakness, fevers or chills  EYES/ENT: No visual changes;  No dysphagia  NECK: No pain or stiffness  RESPIRATORY: No cough, wheezing, hemoptysis; No shortness of breath  CARDIOVASCULAR: No chest pain or palpitations; No lower extremity edema  GASTROINTESTINAL: No abdominal or epigastric pain. No nausea, vomiting, or hematemesis; No diarrhea or constipation. No melena or hematochezia.  BACK: No back pain  GENITOURINARY: No dysuria, frequency or hematuria  NEUROLOGICAL: No numbness or weakness  SKIN: No itching, burning, rashes, or lesions   All other review of systems is negative unless indicated above.    Physical Exam:  T(F): 98 (07-11), Max: 98.7 (07-11)  HR: 78 (07-11) (62 - 90)  BP: 119/76 (07-11) (101/67 - 141/84)  RR: 17 (07-11)  SpO2: 94% (07-11)  GENERAL: No acute distress, well-developed  HEAD:  Atraumatic, Normocephalic  ENT: conjunctiva and sclera clear, Neck supple, No JVD, moist mucosa  CHEST/LUNG: Clear to auscultation bilaterally; No wheeze, equal breath sounds bilaterally   HEART: Regular rate and rhythm; No murmurs, rubs, or gallops  ABDOMEN: Soft, Nontender, Nondistended  EXTREMITIES:  No clubbing, cyanosis, or edema  PSYCH: Nl behavior, nl affect  NEUROLOGY: AAOx3, normal speech  SKIN: Normal color, No rashes or lesions of exposed skin     ECG: Personally reviewed    Echo: Personally reviewed    Stress Testing: Personally reviewed    Cath: Personally reviewed    CXR: Personally reviewed    Labs: Personally reviewed                        13.8   4.99  )-----------( 196      ( 11 Jul 2022 06:30 )             43.1     07-11    142  |  103  |  37<H>  ----------------------------<  112<H>  3.5   |  30  |  1.30    Ca    8.9      11 Jul 2022 06:30  Mg     2.4     07-11                             CARDIOLOGY FELLOW CONSULT NOTE    Consulting service: ED   Reason for consult:    HPI:   The pt is an 88F with HTN who initially presented to the [place] ED for progressively worsening dyspnea for weeks/months.   At , the patient's cardiac evaluation showed the following:  new HFrEF ~40% and a nuclear stress test suggestive of apical inferior and mid inferior ischemia.  The patient was diuresed with improvement in symptoms.  She is currently transferred to [facility] for a coronary angiogram. At present, she denies any chest pain or dyspnea.     PMHx:   HTN (hypertension)    PSHx:   S/P partial thyroidectomy    Allergies:  No Known Allergies      Current Medications:   acetaminophen     Tablet .. 650 milliGRAM(s) Oral every 6 hours PRN  losartan 50 milliGRAM(s) Oral daily  melatonin 3 milliGRAM(s) Oral at bedtime PRN      FAMILY HISTORY:  FH: stroke (Father)  FH: hypertension (Father)  FH: diabetes mellitus (Child)      REVIEW OF SYSTEMS:  CONSTITUTIONAL: No weakness, fevers or chills  EYES/ENT: No visual changes;  No dysphagia  NECK: No pain or stiffness  RESPIRATORY: No cough, wheezing, hemoptysis; No shortness of breath  CARDIOVASCULAR: No chest pain or palpitations; No lower extremity edema  GASTROINTESTINAL: No abdominal or epigastric pain. No nausea, vomiting, or hematemesis; No diarrhea or constipation. No melena or hematochezia.  BACK: No back pain  GENITOURINARY: No dysuria, frequency or hematuria  NEUROLOGICAL: No numbness or weakness  SKIN: No itching, burning, rashes, or lesions   All other review of systems is negative unless indicated above.      Social Hx:  No tobacco or ETOH      Physical Exam:  T(F): 98 (07-11), Max: 98.7 (07-11)  HR: 78 (07-11) (62 - 90)  BP: 119/76 (07-11) (101/67 - 141/84)  RR: 17 (07-11)  SpO2: 94% (07-11)    GENERAL: No acute distress, well-developed  HEAD:  Atraumatic, Normocephalic  ENT: conjunctiva and sclera clear, Neck supple, No JVD, moist mucosa  CHEST/LUNG: Clear to auscultation bilaterally; No wheeze, equal breath sounds bilaterally   HEART: Regular rate and rhythm; No murmurs, rubs, or gallops  ABDOMEN: Soft, Nontender, Nondistended  EXTREMITIES:  No clubbing, cyanosis, or edema  PSYCH: Nl behavior, nl affect  NEUROLOGY: AAOx3, normal speech  SKIN: Normal color, No rashes or lesions of exposed skin       12 Lead ECG (07.04.22 @ 16:49)   ST at 103 BPM, isolated VPCs    P-R Interval 152 ms, QRS Duration 72 ms, Q-T Interval 372 ms    Axis 10 degrees   Minimal voltage criteria for LVH, may be normal variant  Possible incorrect electrode placement lead V6, delayed R wave progression   Confirmed by Nishant Juarez ([id number]) on 7/5/2022 8:14:57 AM      Labs:                       13.8   4.99  )-----------( 196      ( 11 Jul 2022 06:30 )             43.1     07-11  142  |  103  |  37<H>  ----------------------------<  112<H>  3.5   |  30  |  1.30    Ca    8.9      11 Jul 2022 06:30  Mg     2.4     07-11        TTE Echo Complete w/o Contrast w/ Doppler (07.05.22 @ 09:32)   PHYSICIAN INTERPRETATION:  Summary:   1. Moderately decreased global left ventricular systolic function.   2. Left ventricular ejection fraction, by visual estimation, is 40%.   3. Moderate global left ventricle hypokinesis.   4. Normal left ventricular internal cavity size.   5. The right ventricle is not well visualized, appears tohave normal   systolic function.   6. The left atrium is normal in size.   7. Mildly enlarged right atrium.   8. Mild thickening of the anterior and posterior mitral valve leaflets.   9. Mild mitral annular calcification.  10. Moderate mitral valve regurgitation.  11. Mild tricuspid regurgitation.  12. Mild aortic valve leaflet calcification. No aortic valve stenosis.  13. Mild to moderate aortic regurgitation.  14. Mildly dilated proximal ascending aorta (3.5 cm).  15. Moderate pleural effusion in the left lateral region.  16. There is no evidence of pericardial effusion.  [id number] Ranjeet Juarez MD Electronically signed on  7/5/2022 at 2:41:26 PM        Nuclear Stress Pharmacologic Multiple (07.08.22 @ 13:27)   IMPRESSION:  Abnormal SPECT Myocardial Perfusion Imaging post rest and  post vasodilator.  There is a moderate-sized reversible perfusion defect of the apical cap, apical inferior wall and mid inferior wall that is suggestive of ischemia. Mild diaphragmatic attenuation artifact reduces sensitivity of  these findings.   Abnormal left ventricular wall motion with ejection fraction of 48 % (normal: 50% or greater).  Global left ventricle hypokinesis.    NISHANT JUAREZ   This document has been electronically signed. Jul 8 2022  2:47PM

## 2022-07-11 NOTE — H&P ADULT - NSHPPHYSICALEXAM_GEN_ALL_CORE
Vital Signs Last 24 Hrs  T(C): 37.1 (11 Jul 2022 18:05), Max: 37.1 (11 Jul 2022 18:05)  T(F): 98.7 (11 Jul 2022 18:05), Max: 98.7 (11 Jul 2022 18:05)  HR: 90 (11 Jul 2022 18:05) (62 - 90)  BP: 101/67 (11 Jul 2022 18:05) (101/67 - 141/84)  RR: 17 (11 Jul 2022 18:05) (17 - 18)  SpO2: 95% (11 Jul 2022 18:05) (88% - 96%) on RA

## 2022-07-11 NOTE — H&P ADULT - NSICDXFAMILYHX_GEN_ALL_CORE_FT
FAMILY HISTORY:  Father  Still living? No  FH: hypertension, Age at diagnosis: Age Unknown  FH: stroke, Age at diagnosis: Age Unknown    Child  Still living? Unknown  FH: diabetes mellitus, Age at diagnosis: Age Unknown

## 2022-07-11 NOTE — H&P ADULT - PROBLEM SELECTOR PLAN 2
Positive nuclear stress test concerning for ischemic cardiomyopathy  - c/w aspirin  - start high intensity statin  - f/u lipid panel and a1c Positive nuclear stress test concerning for ischemic cardiomyopathy  - plan for ischemic eval as above  - c/w aspirin  - start high intensity statin  - f/u lipid panel and a1c

## 2022-07-11 NOTE — PROGRESS NOTE ADULT - SUBJECTIVE AND OBJECTIVE BOX
NEIL MOROE  [id number]      Chief Complaint: New HFrEF/Abnormal nuclear stress test    Interval History: The patient reports feeling ok today, breathing has improved.     Tele: sinus rhythm 80s BPM      Current meds:   acetaminophen     Tablet .. 650 milliGRAM(s) Oral every 6 hours PRN  aspirin enteric coated 81 milliGRAM(s) Oral daily  furosemide   Injectable 20 milliGRAM(s) IV Push two times a day  losartan 50 milliGRAM(s) Oral daily  metoprolol succinate ER 25 milliGRAM(s) Oral daily      Objective:     Vital Signs:   T(C): 36.4 (07-11-22 @ 05:20), Max: 36.8 (07-10-22 @ 14:03)  HR: 62 (07-11-22 @ 05:20) (62 - 79)  BP: 123/74 (07-11-22 @ 05:20) (110/58 - 124/72)  RR: 17 (07-11-22 @ 05:20) (17 - 18)  SpO2: 95% (07-11-22 @ 05:20) (93% - 96%)  Wt(kg): --    PHYSICAL EXAM:  General: elderly woman, no acute distress  HEENT: sclera anicteric  Neck: supple  CVS: JVP ~ 9 cm H20, RRR, s1, s2  Chest: unlabored respirations, CTAB  Extremities: no lower extremity edema b/l  Neuro: awake, alert & oriented x 3  Psych: normal affect    Labs:   11 Jul 2022 06:30    142    |  103    |  37     ----------------------------<  112    3.5     |  30     |  1.30     Ca    8.9        11 Jul 2022 06:30  Mg     2.4       11 Jul 2022 06:30                            13.8   4.99  )-----------( 196      ( 11 Jul 2022 06:30 )             43.1               ECG (7/4/22): sinus tachycardia, PVC, nonspecific ST abnormalities       TTE (7/5/22):   1. Moderately decreased global left ventricular systolic function.   2. Left ventricular ejection fraction, by visual estimation, is 40%.   3. Moderate global left ventricle hypokinesis.   4. Normal left ventricular internal cavity size.   5. The right ventricle is not well visualized, appears to have normal   systolic function.   6. The left atrium is normal in size.   7. Mildly enlarged right atrium.   8. Mild thickening of the anterior and posterior mitral valve leaflets.   9. Mild mitral annular calcification.  10. Moderate mitral valve regurgitation.  11. Mild tricuspid regurgitation.  12. Mild aortic valve leaflet calcification. No aortic valve stenosis.  13. Mild to moderate aortic regurgitation.  14. Mildly dilated proximal ascending aorta (3.5 cm).  15. Moderate pleural effusion in the left lateral region.  16. There is no evidence of pericardial effusion.      Nuclear Stress Test (7/8/22):  IMPRESSION:  Abnormal SPECT Myocardial Perfusion Imaging post rest and post vasodilator. There is a moderate-sized reversible perfusion defect of the apical cap, apical inferior wall and mid inferior wall that is suggestive of ischemia. Mild diaphragmatic attenuation artifact reduces sensitivity of these findings. Abnormal left ventricular wall motion with ejection fraction of 48 %  (normal: 50% or greater). Global left ventricle hypokinesis.    No prior cardiac workup in chart

## 2022-07-11 NOTE — H&P ADULT - PROBLEM SELECTOR PLAN 1
New HFrEF 40% with moderated decreased global LVSF with positive nuclear stress test concerning for ischemic cardiomyopathy  - ischemic eval per cardiology (per [facility] documentation case discussed with Dr. Rivera)  - called cardiology on call on 7/11, pt to be seen by Dr. Rivera 7/12 AM  - c/w GDMT - on toprol, losartan  - strict I&Os, daily weights, monitor fluid status daily New HFrEF 40% with moderated decreased global LVSF with positive nuclear stress test concerning for ischemic cardiomyopathy  - ischemic eval per cardiology (per [facility] documentation case discussed with Dr. Rivera)  - called cardiology on call on 7/11, pt to be seen by Dr. Rivera 7/12 AM  - c/w GDMT - on toprol, losartan  - c/w lasix 40 PO daily  - strict I&Os, daily weights, monitor fluid status daily

## 2022-07-11 NOTE — PROGRESS NOTE ADULT - ASSESSMENT
87yo female with hx of HTN, chronic back pain, presented to the ED with complaints of progressively worsening sob x a few weeks to possibly months associated with LE edema, concerning for new onset acute systolic CHF.    Probable NSTEMI  New Acute systolic heart failure  cardiology following  TTE with EF 40%  stress test abnormal with mod sized reversible perfusion defect  continue lasix 20 IV BID daily, change to oral on DC  continue BB, ARB, ASA monitor daily weights, I/Os  DASH diet with fluid restriction  plan for Cardiac Cath, Transfer to [facility] today   continue ASA therapy, continue to monitor on tele    # Hypokalemia  replete as needed  monitor electrolytes  follow BMP    called and provided an update for pt family member Nino EstradaLissette [phone] regarding plan of care, all in agreement    transfer today to [facility]

## 2022-07-11 NOTE — H&P ADULT - HISTORY OF PRESENT ILLNESS
88F with HTN presents to  ED for progressively worsening SOB x weeks to months. At , pt was found to have positive new HFrEF ~40% and was diuresed with improvement in respiratory status. A nuclear stress test was also suggestive of apical inferior and mid inferior ischemia and subsequently transferred to [facility] for coronary angiogram. In the interim, pt denies any acute symptoms, does not feel short of breath or orthopnea.  88F with HTN presents to  ED for progressively worsening SOB x weeks to months. At , pt was found to have positive new HFrEF ~40% and was diuresed with improvement in respiratory status. A nuclear stress test was also suggestive of apical inferior and mid inferior ischemia and subsequently transferred to [facility] for coronary angiogram. In the interim, pt denies any acute symptoms, feels at her baseline usual state of health, does not feel short of breath or orthopnea.

## 2022-07-11 NOTE — H&P ADULT - NSHPLABSRESULTS_GEN_ALL_CORE
LABS:                        13.8   4.99  )-----------( 196      ( 11 Jul 2022 06:30 )             43.1     07-11    142  |  103  |  37<H>  ----------------------------<  112<H>  3.5   |  30  |  1.30    Ca    8.9      11 Jul 2022 06:30  Mg     2.4     07-11    COVID-19 PCR: NotDetec (08 Jul 2022 16:25)  SARS-CoV-2: NotDetec (04 Jul 2022 17:10)  COVID-19 PCR: NotDetec (18 Apr 2022 17:40)    RADIOLOGY & ADDITIONAL TESTS:  < from: NM Nuclear Stress Pharmacologic Multiple (07.08.22 @ 13:27) >    IMPRESSION:  Abnormal SPECT Myocardial Perfusion Imaging post rest and  post vasodilator. There is a moderate-sized reversible perfusion defect of the apical cap, apical inferior wall and mid inferior wall that is suggestive of ischemia. Mild diaphragmatic attenuation artifact reduces sensitivity of these findings. Abnormal left ventricular wall motion with ejection fraction of 48 % (normal: 50% or greater). Global left ventricle hypokinesis.  < end of copied text >    < from: Xray Chest 1 View- PORTABLE-Urgent (Xray Chest 1 View- PORTABLE-Urgent .) (07.06.22 @ 21:08) >    IMPRESSION:   No radiographic evidence of active chest disease. Lateral   or bilateral decubitus radiographs recommended to exclude posterior lung   base pathology.    < from: TTE Echo Complete w/o Contrast w/ Doppler (07.05.22 @ 09:32) >    Summary:   1. Moderately decreased global left ventricular systolic function.   2. Left ventricular ejection fraction, by visual estimation, is 40%.   3. Moderate global left ventricle hypokinesis.   4. Normal left ventricular internal cavity size.   5. The right ventricle is not well visualized, appears tohave normal   systolic function.   6. The left atrium is normal in size.   7. Mildly enlarged right atrium.   8. Mild thickening of the anterior and posterior mitral valve leaflets.   9. Mild mitral annular calcification.  10. Moderate mitral valve regurgitation.  11. Mild tricuspid regurgitation.  12. Mild aortic valve leaflet calcification. No aortic valve stenosis.  13. Mild to moderate aortic regurgitation.  14. Mildly dilated proximal ascending aorta (3.5 cm).  15. Moderate pleural effusion in the left lateral region.  16. There is no evidence of pericardial effusion.    < end of copied text >    EKG personally reviewed 7/4 - sinus tachy with PVC, QTc 487,     Telemetry 7/11 - sinus 50-70s, no ectopy    [facility] records reviewed

## 2022-07-11 NOTE — CONSULT NOTE ADULT - ASSESSMENT
The pt is an 88F with HTN who initially presented to the [place] ED for progressively worsening dyspnea for weeks/months and was transferred to [facility] for an angiogram after testing at  indicated newly reduced EF of 40% with wall motion abnormalities on nuclear stress test.    Abnormal stress test   - Nuclear stress test suggestive of apical inferior and mid inferior ischemia  - Angiogram planned for tomorrow   - Please obtain new EKG, last EKG appears to be from  on 7/4    New onset HFrEF   - EF noted to be ~40% at    - Continue oral Furosemide and Metoprolol  - May hold Losartan and resume if Cr stable after cath     Please see attending's addendum for final recommendations     Signed by:  Gay Garcia  PGY-5 Cardiology   The pt is an 88F with HTN who initially presented to the [place] ED for progressively worsening dyspnea for weeks/months and was transferred to [facility] for an angiogram after testing at  indicated newly reduced EF of 40% with wall motion abnormalities on nuclear stress test.    Abnormal stress test   - Nuclear stress test suggestive of apical inferior and mid inferior ischemia  - Angiogram planned for tomorrow   - Please obtain new EKG, last EKG appears to be from  on 7/4  - Please hold any DVT ppx prior to cath     New onset HFrEF   - EF noted to be ~40% at    - Continue oral Furosemide and Metoprolol  - May hold Losartan and resume if Cr stable after cath     Please see attending's addendum for final recommendations     Signed by:  Gay Gracia  PGY-5 Cardiology 87 yo F with HTN, who initially presented to the [place] ED for progressively worsening dyspnea for weeks/months.  No transferred to [facility] for an angiogram after testing at [facility]. revealed a newly reduced EF of 40% and perfusion abnormalities on nuclear stress test c/w ischemia.      REC:  1.  Abnormal stress test   - Nuclear stress test suggestive of apical inferior and mid inferior ischemia  - Angiogram planned for tomorrow   - Please obtain new EKG, last EKG appears to be from  on 7/4  - Please hold any DVT ppx prior to cath     2.  New onset HFrEF   - EF noted to be ~40% at    - Continue oral furosemide and metoprolol  - May hold Losartan and resume if Cr stable after cath     3.  HTN  - continue metoprolol and Lasix  - resume ARB post cath  Please see attending's addendum for final recommendations       Signed by:  Gay Garcia  PGY-5 Cardiology    Plan discussed with cardiology fellow.  Patient seen and examined.  Hx., exam and labs as above.  I agree with the assessment and recommendations. which I have reviewed and edited where appropriate.  Nahid Flood M.D.  Cardiology Attending, Consult Service  For Cardiology consults and questions, all Cardiology service information can be found 24/7 on [email or web address] - use password: [email or web address] to log in.

## 2022-07-11 NOTE — H&P ADULT - ASSESSMENT
88F with HTN admitted to [facility] for new heart failure with positive nuc stress test, transferred to [facility] for ischemic evaluation.

## 2022-07-12 ENCOUNTER — TRANSCRIPTION ENCOUNTER (OUTPATIENT)
Age: 87
End: 2022-07-12

## 2022-07-12 LAB
A1C WITH ESTIMATED AVERAGE GLUCOSE RESULT: 6.1 % — HIGH (ref 4–5.6)
ANION GAP SERPL CALC-SCNC: 12 MMOL/L — SIGNIFICANT CHANGE UP (ref 5–17)
BUN SERPL-MCNC: 36 MG/DL — HIGH (ref 7–23)
CALCIUM SERPL-MCNC: 9.3 MG/DL — SIGNIFICANT CHANGE UP (ref 8.4–10.5)
CHLORIDE SERPL-SCNC: 104 MMOL/L — SIGNIFICANT CHANGE UP (ref 96–108)
CHOLEST SERPL-MCNC: 163 MG/DL — SIGNIFICANT CHANGE UP
CO2 SERPL-SCNC: 27 MMOL/L — SIGNIFICANT CHANGE UP (ref 22–31)
CREAT SERPL-MCNC: 1.07 MG/DL — SIGNIFICANT CHANGE UP (ref 0.5–1.3)
EGFR: 50 ML/MIN/1.73M2 — LOW
ESTIMATED AVERAGE GLUCOSE: 128 MG/DL — HIGH (ref 68–114)
GLUCOSE SERPL-MCNC: 101 MG/DL — HIGH (ref 70–99)
HCT VFR BLD CALC: 46.4 % — HIGH (ref 34.5–45)
HDLC SERPL-MCNC: 45 MG/DL — LOW
HGB BLD-MCNC: 14.8 G/DL — SIGNIFICANT CHANGE UP (ref 11.5–15.5)
LIPID PNL WITH DIRECT LDL SERPL: 100 MG/DL — HIGH
MAGNESIUM SERPL-MCNC: 2.2 MG/DL — SIGNIFICANT CHANGE UP (ref 1.6–2.6)
MCHC RBC-ENTMCNC: 29.1 PG — SIGNIFICANT CHANGE UP (ref 27–34)
MCHC RBC-ENTMCNC: 31.9 GM/DL — LOW (ref 32–36)
MCV RBC AUTO: 91.3 FL — SIGNIFICANT CHANGE UP (ref 80–100)
NON HDL CHOLESTEROL: 118 MG/DL — SIGNIFICANT CHANGE UP
NRBC # BLD: 0 /100 WBCS — SIGNIFICANT CHANGE UP (ref 0–0)
PHOSPHATE SERPL-MCNC: 3.4 MG/DL — SIGNIFICANT CHANGE UP (ref 2.5–4.5)
PLATELET # BLD AUTO: 206 K/UL — SIGNIFICANT CHANGE UP (ref 150–400)
POTASSIUM SERPL-MCNC: 3.9 MMOL/L — SIGNIFICANT CHANGE UP (ref 3.5–5.3)
POTASSIUM SERPL-SCNC: 3.9 MMOL/L — SIGNIFICANT CHANGE UP (ref 3.5–5.3)
RBC # BLD: 5.08 M/UL — SIGNIFICANT CHANGE UP (ref 3.8–5.2)
RBC # FLD: 14.8 % — HIGH (ref 10.3–14.5)
SODIUM SERPL-SCNC: 143 MMOL/L — SIGNIFICANT CHANGE UP (ref 135–145)
TRIGL SERPL-MCNC: 91 MG/DL — SIGNIFICANT CHANGE UP
WBC # BLD: 4.76 K/UL — SIGNIFICANT CHANGE UP (ref 3.8–10.5)
WBC # FLD AUTO: 4.76 K/UL — SIGNIFICANT CHANGE UP (ref 3.8–10.5)

## 2022-07-12 PROCEDURE — 93010 ELECTROCARDIOGRAM REPORT: CPT

## 2022-07-12 PROCEDURE — 93454 CORONARY ARTERY ANGIO S&I: CPT | Mod: 26

## 2022-07-12 PROCEDURE — 99152 MOD SED SAME PHYS/QHP 5/>YRS: CPT

## 2022-07-12 PROCEDURE — 99233 SBSQ HOSP IP/OBS HIGH 50: CPT

## 2022-07-12 RX ADMIN — LOSARTAN POTASSIUM 50 MILLIGRAM(S): 100 TABLET, FILM COATED ORAL at 05:44

## 2022-07-12 RX ADMIN — Medication 25 MILLIGRAM(S): at 05:44

## 2022-07-12 RX ADMIN — ATORVASTATIN CALCIUM 80 MILLIGRAM(S): 80 TABLET, FILM COATED ORAL at 22:03

## 2022-07-12 RX ADMIN — Medication 40 MILLIGRAM(S): at 05:44

## 2022-07-12 NOTE — DISCHARGE NOTE PROVIDER - HOSPITAL COURSE
88F with HTN admitted to [facility] for new heart failure with positive nuclear stress test, transferred to [facility] for ischemic evaluation.     HFrEF (heart failure with reduced ejection fraction).   -New HFrEF 40% with moderated decreased global LVSF with positive nuclear stress test concerning for ischemic cardiomyopathy  - c/w GDMT - on toprol, losartan  - c/w lasix 40 PO daily  - strict I&Os, daily weights, monitor fluid status daily.    CAD (coronary artery disease).  -Positive nuclear stress test concerning for ischemic cardiomyopathy  - s/p diagnostic C on 7/12/22 (mild CAD)  - c/w aspirin- Monitor on tele    Hypertension.   - c/w toprol, losartan.         88F with HTN who initially presented to the [place] ED for progressively worsening dyspnea for weeks/months. At , the patient's cardiac evaluation showed the following: new HFrEF ~40% and a nuclear stress test suggestive of apical inferior and mid inferior ischemia.  The patient was diuresed with improvement in symptoms.    Patient was transferred to [facility] for a coronary angiogram, admitting to telemetry. Cardiology team was consulted. LHC was performed on 7/12 revealing non-obstructive CAD. Patient advised to continue Aspirin 81mg, Atorvastatin 80mg qHS. RRA site 2+ pulse, no hematoma, counseled on site monitoring outpatient. Regarding new onset HFrEF - patient advised to c/w toprol, losartan, lasix. While inpatient, patient was monitored with strict I&Os, daily weights, monitor fluid status daily.          88F with HTN who initially presented to the [place] ED for progressively worsening dyspnea for weeks/months. At , the patient's cardiac evaluation showed the following: new HFrEF ~40% and a nuclear stress test suggestive of apical inferior and mid inferior ischemia.  The patient was diuresed with improvement in symptoms.    Patient was transferred to [facility] for a coronary angiogram, admitted to telemetry. Cardiology team was consulted. LHC was performed on 7/12 revealing non-obstructive CAD. Patient advised to continue Aspirin 81mg, Atorvastatin 80mg qHS. RRA site 2+ pulse, no hematoma, counseled on site monitoring outpatient. Regarding new onset HFrEF - patient advised to c/w toprol, losartan, lasix. While inpatient, patient was monitored with strict I&Os, daily weights, monitor fluid status daily. Patient advised to follow up with Cardiology ~2weeks from discharge     Patient medically cleared for discharge home with outpatient follow up with PCP and cardiology

## 2022-07-12 NOTE — DISCHARGE NOTE PROVIDER - NSDCMRMEDTOKEN_GEN_ALL_CORE_FT
aspirin 81 mg oral tablet: 1 tab(s) orally once a day  furosemide 40 mg oral tablet: 1 tab(s) orally once a day  losartan 50 mg oral tablet: 1 tab(s) orally once a day  metoprolol succinate 25 mg oral tablet, extended release: 1 tab(s) orally once a day   aspirin 81 mg oral delayed release tablet: 1 tab(s) orally once a day  atorvastatin 80 mg oral tablet: 1 tab(s) orally once a day (at bedtime)  furosemide 40 mg oral tablet: 1 tab(s) orally once a day  losartan 50 mg oral tablet: 1 tab(s) orally once a day  metoprolol succinate 25 mg oral tablet, extended release: 1 tab(s) orally once a day

## 2022-07-12 NOTE — DISCHARGE NOTE PROVIDER - NSFOLLOWUPCLINICS_GEN_ALL_ED_FT
[facility]  Cardiology  [address] [place] [address]  Phone: [phone]  Fax:   Follow Up Time: 2 weeks

## 2022-07-12 NOTE — DISCHARGE NOTE PROVIDER - NSDCFUSCHEDAPPT_GEN_ALL_CORE_FT
[facility]  [address]  Scheduled Appointment: 07/27/2022     Earnest Silva  [place] [facility]  CARDIOLOGY [address]  Scheduled Appointment: 07/21/2022    [facility]  [place] [facility]  [address]  Scheduled Appointment: 07/27/2022

## 2022-07-12 NOTE — PROGRESS NOTE ADULT - ASSESSMENT
88F with HTN admitted to [facility] for new heart failure with positive nuc stress test, transferred to [facility] for ischemic evaluation.      Problem/Plan - 1:  ·  Problem: HFrEF (heart failure with reduced ejection fraction).   ·  Plan: New HFrEF 40% with moderated decreased global LVSF with positive nuclear stress test concerning for ischemic cardiomyopathy  - Appreciate cardiology recs  - c/w GDMT - on toprol, losartan  - c/w lasix 40 PO daily  - strict I&Os, daily weights, monitor fluid status daily.     Problem/Plan - 2:  ·  Problem: CAD (coronary artery disease).  ·  Plan: Positive nuclear stress test concerning for ischemic cardiomyopathy  - s/p LHC today; f/u official results  - c/w aspiri- Monitor on tele     Problem/Plan - 3:  ·  Problem: Hypertension.   ·  Plan: - c/w toprol, losartan.     Problem/Plan - 4:  ·  Problem: Prophylactic measure.   ·  Plan: DVT: improve score 1, low risk  Diet: dash/tlc

## 2022-07-12 NOTE — DISCHARGE NOTE PROVIDER - NSDCFUADDAPPT_GEN_ALL_CORE_FT
APPTS ARE READY TO BE MADE: [ ] YES    Best Family or Patient Contact (if needed):    Additional Information about above appointments (if needed):    1: Please follow up with DR Escobar   2: Please follow up with cardiology clinic   3:     Other comments or requests:    APPTS ARE READY TO BE MADE: [X] YES    Best Family or Patient Contact (if needed):    Additional Information about above appointments (if needed):    1: Please follow up with DR Escobar   2: Please follow up with cardiology clinic   3:     Other comments or requests:    APPTS ARE READY TO BE MADE: [X] YES    Best Family or Patient Contact (if needed):    Additional Information about above appointments (if needed):    1: Please follow up with DR Escobar on07/27/2022 at 0230   2: Please follow up with cardiology clinic   3:     Other comments or requests:    APPTS ARE READY TO BE MADE: [X] YES    Best Family or Patient Contact (if needed):    Additional Information about above appointments (if needed):    1: Please follow up with DR Escobar on07/27/2022 at 0230   2: Please follow up with cardiology clinic  DR Silva on 07/21/2022 at 0710 am   3:     Other comments or requests:    APPTS ARE READY TO BE MADE: [X] YES    Best Family or Patient Contact (if needed):    Additional Information about above appointments (if needed):    1: Please follow up with DR Escobar on07/27/2022 at 0230   2: Please follow up with cardiology clinic  DR Silva on 07/21/2022 at 0710 am   3:     Other comments or requests:   Patient was provided with follow up request details and was advised to call to schedule follow up within specified time frame.

## 2022-07-12 NOTE — PROGRESS NOTE ADULT - SUBJECTIVE AND OBJECTIVE BOX
[facility] Division of Hospital Medicine  Liz Herrera MD  Pager (M-F, 8A-5P): [phone]  Other Times:  [phone]    Patient is a 88y old  Female who presents with a chief complaint of positive stress test, needs cath (11 Jul 2022 21:11)      SUBJECTIVE / OVERNIGHT EVENTS:  ADDITIONAL REVIEW OF SYSTEMS:    MEDICATIONS  (STANDING):  aspirin enteric coated 81 milliGRAM(s) Oral daily  atorvastatin 80 milliGRAM(s) Oral at bedtime  furosemide    Tablet 40 milliGRAM(s) Oral daily  losartan 50 milliGRAM(s) Oral daily  metoprolol succinate ER 25 milliGRAM(s) Oral daily    MEDICATIONS  (PRN):  acetaminophen     Tablet .. 650 milliGRAM(s) Oral every 6 hours PRN Temp greater or equal to 38C (100.4F), Mild Pain (1 - 3)  melatonin 3 milliGRAM(s) Oral at bedtime PRN Insomnia      CAPILLARY BLOOD GLUCOSE        I&O's Summary    11 Jul 2022 07:01  -  12 Jul 2022 07:00  --------------------------------------------------------  IN: 237 mL / OUT: 0 mL / NET: 237 mL    12 Jul 2022 07:01  -  12 Jul 2022 15:46  --------------------------------------------------------  IN: 0 mL / OUT: 0 mL / NET: 0 mL        PHYSICAL EXAM:  Vital Signs Last 24 Hrs  T(C): 36.8 (12 Jul 2022 15:14), Max: 37.1 (11 Jul 2022 18:05)  T(F): 98.3 (12 Jul 2022 15:14), Max: 98.7 (11 Jul 2022 18:05)  HR: 74 (12 Jul 2022 15:14) (63 - 90)  BP: 102/69 (12 Jul 2022 15:14) (93/53 - 134/84)  BP(mean): 73 (12 Jul 2022 13:15) (64 - 101)  RR: 18 (12 Jul 2022 15:14) (17 - 18)  SpO2: 93% (12 Jul 2022 15:14) (88% - 100%)    GENERAL: Well appearing, in NAD  EYES: EOMI, conjunctiva and sclera clear  ENT: moist mucosa, no pharyngeal erythema  NECK: supple, no JVD  LUNG: Clear to auscultation bilaterally; No rales, rhonchi, wheezing, or rubs.   CVS: Regular rate and rhythm; No murmurs, rubs, or gallops  ABDOMEN: Soft, non tender, nondistended. +Bowel sounds.  EXTREMITIES:  no edema, no cyanosis  NEURO:  Alert & Oriented X3,  No focal deficits  PSYCH: Normal affect, normal mood  MUSCULOSKELETAL: FROM, no joint swelling  Skin: warm and dry, normal color    LABS: reviewed                        14.8   4.76  )-----------( 206      ( 12 Jul 2022 07:23 )             46.4     07-12    143  |  104  |  36<H>  ----------------------------<  101<H>  3.9   |  27  |  1.07    Ca    9.3      12 Jul 2022 07:23  Phos  3.4     07-12  Mg     2.2     07-12         [facility] Division of Hospital Medicine  Liz Herrera MD  Pager (M-F, 8A-5P): [phone]  Other Times:  [phone]    Patient is a 88y old  Female who presents with a chief complaint of positive stress test, needs cath (11 Jul 2022 21:11)      SUBJECTIVE / OVERNIGHT EVENTS: Seen and examined at bedside. No acute events. Radial access site with minimal bleeding. Denies Cp or SOB.  ADDITIONAL REVIEW OF SYSTEMS: negative    MEDICATIONS  (STANDING):  aspirin enteric coated 81 milliGRAM(s) Oral daily  atorvastatin 80 milliGRAM(s) Oral at bedtime  furosemide    Tablet 40 milliGRAM(s) Oral daily  losartan 50 milliGRAM(s) Oral daily  metoprolol succinate ER 25 milliGRAM(s) Oral daily    MEDICATIONS  (PRN):  acetaminophen     Tablet .. 650 milliGRAM(s) Oral every 6 hours PRN Temp greater or equal to 38C (100.4F), Mild Pain (1 - 3)  melatonin 3 milliGRAM(s) Oral at bedtime PRN Insomnia      CAPILLARY BLOOD GLUCOSE        I&O's Summary    11 Jul 2022 07:01  -  12 Jul 2022 07:00  --------------------------------------------------------  IN: 237 mL / OUT: 0 mL / NET: 237 mL    12 Jul 2022 07:01  -  12 Jul 2022 15:46  --------------------------------------------------------  IN: 0 mL / OUT: 0 mL / NET: 0 mL        PHYSICAL EXAM:  Vital Signs Last 24 Hrs  T(C): 36.8 (12 Jul 2022 15:14), Max: 37.1 (11 Jul 2022 18:05)  T(F): 98.3 (12 Jul 2022 15:14), Max: 98.7 (11 Jul 2022 18:05)  HR: 74 (12 Jul 2022 15:14) (63 - 90)  BP: 102/69 (12 Jul 2022 15:14) (93/53 - 134/84)  BP(mean): 73 (12 Jul 2022 13:15) (64 - 101)  RR: 18 (12 Jul 2022 15:14) (17 - 18)  SpO2: 93% (12 Jul 2022 15:14) (88% - 100%)    GENERAL: Well appearing, in NAD  EYES: EOMI, conjunctiva and sclera clear  ENT: moist mucosa, no pharyngeal erythema  NECK: supple, no JVD  LUNG: Clear to auscultation bilaterally; No rales, rhonchi, wheezing, or rubs.   CVS: Regular rate and rhythm; No murmurs, rubs, or gallops  ABDOMEN: Soft, non tender, nondistended. +Bowel sounds.  EXTREMITIES:  no edema, no cyanosis  NEURO:  Alert & Oriented X3,  No focal deficits  PSYCH: Normal affect, normal mood  MUSCULOSKELETAL: FROM, no joint swelling  Skin: warm and dry, normal color    LABS: reviewed                        14.8   4.76  )-----------( 206      ( 12 Jul 2022 07:23 )             46.4     07-12    143  |  104  |  36<H>  ----------------------------<  101<H>  3.9   |  27  |  1.07    Ca    9.3      12 Jul 2022 07:23  Phos  3.4     07-12  Mg     2.2     07-12

## 2022-07-12 NOTE — DISCHARGE NOTE PROVIDER - CARE PROVIDER_API CALL
Chago Deal  GASTROENTEROLOGY  [address] [place] [address], [address]  [place], [place] [id number]  Phone: [phone]  Fax: [phone]  Follow Up Time:    Chago Deal  GASTROENTEROLOGY  [address] [place] [address], [address]  [place], [place] [id number]  Phone: [phone]  Fax: [phone]  Follow Up Time:     Earnest Silva)  Cardiovascular Disease; Interventional Cardiology  [address] [place] [address]  Phone: [phone]  Fax: [phone]  Follow Up Time:

## 2022-07-12 NOTE — DISCHARGE NOTE PROVIDER - NSDCCPCAREPLAN_GEN_ALL_CORE_FT
PRINCIPAL DISCHARGE DIAGNOSIS  Diagnosis: HFrEF (heart failure with reduced ejection fraction)  Assessment and Plan of Treatment: Patient was transferred to [facility] for a coronary angiogram, admitted to telemetry. Cardiology team was consulted. Left heart Cath was performed on 7/12 revealing non-obstructive CAD.   Please follow up with Cardiology 2weeks from discharge  Take all medications as prescribed.Weigh yourself daily.  If you gain 3lbs in 3 days, or 5lbs in a week call your Health Care Provider.  Eat a low sodium diet.  If you have pulmonary hypertension and you are a female of child bearing age, talk to your caregiver about using birth control pills or getting pregnant.  Call your Health Care Provider if you have any swelling or increased swelling in your feet, ankles, and/or stomach.  If you experience dizziness, chest pain, or shortness of breath, seek immediate medical attention.        SECONDARY DISCHARGE DIAGNOSES  Diagnosis: Hypertension  Assessment and Plan of Treatment: Continue to follow a low salt/sodium diet.  Perform physical activities as tolerated in consultation with your Primary Care Provider and physical therapist.  Take all medications as prescribed.  Follow up with your medical doctor for routine blood pressure monitoring at your next visit.  Notify your doctor if you have any of the following symptoms:  Dizziness, lightheadedness, blurry vision, headache, chest pain, or shortness of breath.      Diagnosis: CAD (coronary artery disease)  Assessment and Plan of Treatment: Low salt, low fat, low cholesterol diet   Continue medication as prescribed  Exercise, increase your activity level

## 2022-07-12 NOTE — DISCHARGE NOTE PROVIDER - PROVIDER TOKENS
PROVIDER:[TOKEN:[[id number]]] PROVIDER:[TOKEN:[[id number]]],PROVIDER:[TOKEN:[[id number]]]

## 2022-07-13 ENCOUNTER — TRANSCRIPTION ENCOUNTER (OUTPATIENT)
Age: 87
End: 2022-07-13

## 2022-07-13 VITALS
TEMPERATURE: 97 F | HEART RATE: 72 BPM | RESPIRATION RATE: 18 BRPM | SYSTOLIC BLOOD PRESSURE: 97 MMHG | OXYGEN SATURATION: 96 % | DIASTOLIC BLOOD PRESSURE: 67 MMHG

## 2022-07-13 PROCEDURE — 93454 CORONARY ARTERY ANGIO S&I: CPT

## 2022-07-13 PROCEDURE — 99239 HOSP IP/OBS DSCHRG MGMT >30: CPT

## 2022-07-13 PROCEDURE — 99232 SBSQ HOSP IP/OBS MODERATE 35: CPT | Mod: GC

## 2022-07-13 PROCEDURE — C1887: CPT

## 2022-07-13 PROCEDURE — C1769: CPT

## 2022-07-13 PROCEDURE — 83735 ASSAY OF MAGNESIUM: CPT

## 2022-07-13 PROCEDURE — 84100 ASSAY OF PHOSPHORUS: CPT

## 2022-07-13 PROCEDURE — 80061 LIPID PANEL: CPT

## 2022-07-13 PROCEDURE — 80048 BASIC METABOLIC PNL TOTAL CA: CPT

## 2022-07-13 PROCEDURE — C1894: CPT

## 2022-07-13 PROCEDURE — 97162 PT EVAL MOD COMPLEX 30 MIN: CPT

## 2022-07-13 PROCEDURE — 85027 COMPLETE CBC AUTOMATED: CPT

## 2022-07-13 PROCEDURE — 93005 ELECTROCARDIOGRAM TRACING: CPT

## 2022-07-13 PROCEDURE — 83036 HEMOGLOBIN GLYCOSYLATED A1C: CPT

## 2022-07-13 RX ORDER — LOSARTAN POTASSIUM 100 MG/1
1 TABLET, FILM COATED ORAL
Qty: 0 | Refills: 0 | DISCHARGE

## 2022-07-13 RX ORDER — FUROSEMIDE 40 MG
1 TABLET ORAL
Qty: 30 | Refills: 0
Start: 2022-07-13 | End: 2022-08-11

## 2022-07-13 RX ORDER — ATORVASTATIN CALCIUM 80 MG/1
1 TABLET, FILM COATED ORAL
Qty: 30 | Refills: 0
Start: 2022-07-13 | End: 2022-08-11

## 2022-07-13 RX ORDER — ASPIRIN/CALCIUM CARB/MAGNESIUM 324 MG
1 TABLET ORAL
Qty: 30 | Refills: 0
Start: 2022-07-13 | End: 2022-08-11

## 2022-07-13 RX ORDER — LOSARTAN POTASSIUM 100 MG/1
1 TABLET, FILM COATED ORAL
Qty: 30 | Refills: 0
Start: 2022-07-13 | End: 2022-08-11

## 2022-07-13 RX ORDER — ASPIRIN/CALCIUM CARB/MAGNESIUM 324 MG
1 TABLET ORAL
Qty: 0 | Refills: 0 | DISCHARGE

## 2022-07-13 RX ORDER — METOPROLOL TARTRATE 50 MG
1 TABLET ORAL
Qty: 30 | Refills: 0
Start: 2022-07-13 | End: 2022-08-11

## 2022-07-13 RX ADMIN — Medication 81 MILLIGRAM(S): at 12:19

## 2022-07-13 RX ADMIN — Medication 25 MILLIGRAM(S): at 05:27

## 2022-07-13 RX ADMIN — Medication 40 MILLIGRAM(S): at 05:57

## 2022-07-13 RX ADMIN — LOSARTAN POTASSIUM 50 MILLIGRAM(S): 100 TABLET, FILM COATED ORAL at 05:27

## 2022-07-13 NOTE — PHYSICAL THERAPY INITIAL EVALUATION ADULT - PLANNED THERAPY INTERVENTIONS, PT EVAL
Stairs Goal: Pt will go up /down 6 steps with + handrail and rest every 3-4 steps ./balance training/gait training/strengthening

## 2022-07-13 NOTE — PROGRESS NOTE ADULT - SUBJECTIVE AND OBJECTIVE BOX
Patient seen and examined at bedside.    Overnight Events: No acute events.       Review Of Systems: No chest pain, shortness of breath, or palpitations            Current Meds:  acetaminophen     Tablet .. 650 milliGRAM(s) Oral every 6 hours PRN  aspirin enteric coated 81 milliGRAM(s) Oral daily  atorvastatin 80 milliGRAM(s) Oral at bedtime  furosemide    Tablet 40 milliGRAM(s) Oral daily  losartan 50 milliGRAM(s) Oral daily  melatonin 3 milliGRAM(s) Oral at bedtime PRN  metoprolol succinate ER 25 milliGRAM(s) Oral daily      Vitals:  T(F): 97.8 (07-13), Max: 98.3 (07-12)  HR: 65 (07-13) (65 - 84)  BP: 112/73 (07-13) (93/53 - 123/67)  RR: 18 (07-13)  SpO2: 96% (07-13)  I&O's Summary    12 Jul 2022 07:01  -  13 Jul 2022 07:00  --------------------------------------------------------  IN: 474 mL / OUT: 0 mL / NET: 474 mL        Physical Exam:  Appearance: No acute distress; well appearing  Eyes: PERRL, EOMI, pink conjunctiva  HEENT: Normal oral mucosa  Cardiovascular: RRR, S1, S2, no murmurs, rubs, or gallops; no edema; no JVD  Respiratory: Clear to auscultation bilaterally  Gastrointestinal: soft, non-tender, non-distended with normal bowel sounds  Musculoskeletal: No clubbing; no joint deformity   Neurologic: Non-focal  Lymphatic: No lymphadenopathy  Psychiatry: AAOx3, mood & affect appropriate  Skin: No rashes, ecchymoses, or cyanosis                          14.8   4.76  )-----------( 206      ( 12 Jul 2022 07:23 )             46.4     07-12    143  |  104  |  36<H>  ----------------------------<  101<H>  3.9   |  27  |  1.07    Ca    9.3      12 Jul 2022 07:23  Phos  3.4     07-12  Mg     2.2     07-12                    New ECG(s): Personally reviewed    Echo:    Stress Testing:     Cath:    Imaging:    Interpretation of Telemetry:   Patient seen and examined at bedside.    Overnight Events: No acute events.     REVIEW OF SYSTEMS:  Constitutional:     [x ] negative [ ] fevers [ ] chills [ ] weight loss [ ] weight gain  HEENT:                  [x ] negative [ ] dry eyes [ ] eye irritation [ ] postnasal drip [ ] nasal congestion  CV:                         [ x] negative  [ ] chest pain [ ] orthopnea [ ] palpitations [ ] murmur  Resp:                     [ x] negative [ ] cough [ ] shortness of breath [ ] dyspnea [ ] wheezing [ ] sputum [ ]hemoptysis  GI:                          [ x] negative [ ] nausea [ ] vomiting [ ] diarrhea [ ] constipation [ ] abd pain [ ] dysphagia   :                        [ x] negative [ ] dysuria [ ] nocturia [ ] hematuria [ ] increased urinary frequency  Musculoskeletal: [x ] negative [ ] back pain [ ] myalgias [ ] arthralgias [ ] fracture  Skin:                       [ x] negative [ ] rash [ ] itch  Neurological:        [ ] negative [x ] headache [ ] dizziness [ ] syncope [ ] weakness [ ] numbness  Psychiatric:           [ x] negative [ ] anxiety [ ] depression  Endocrine:            [ x] negative [ ] diabetes [ ] thyroid problem  Heme/Lymph:      [ x] negative [ ] anemia [ ] bleeding problem  Allergic/Immune: [ x] negative [ ] itchy eyes [ ] nasal discharge [ ] hives [ ] angioedema  [ x] All other systems negative       Current Meds:  acetaminophen     Tablet .. 650 milliGRAM(s) Oral every 6 hours PRN  aspirin enteric coated 81 milliGRAM(s) Oral daily  atorvastatin 80 milliGRAM(s) Oral at bedtime  furosemide    Tablet 40 milliGRAM(s) Oral daily  losartan 50 milliGRAM(s) Oral daily  melatonin 3 milliGRAM(s) Oral at bedtime PRN  metoprolol succinate ER 25 milliGRAM(s) Oral daily      Vitals:  T(F): 97.8 (07-13), Max: 98.3 (07-12)  HR: 65 (07-13) (65 - 84)  BP: 112/73 (07-13) (93/53 - 123/67)  RR: 18 (07-13)  SpO2: 96% (07-13)  I&O's Summary    12 Jul 2022 07:01  -  13 Jul 2022 07:00  --------------------------------------------------------  IN: 474 mL / OUT: 0 mL / NET: 474 mL        Physical Exam:  Appearance: No acute distress; well appearing  Eyes: PERRL, EOMI, pink conjunctiva  HEENT: Normal oral mucosa  Cardiovascular: RRR, S1, S2, no murmurs, rubs, or gallops; no edema; no JVD  Respiratory: Clear to auscultation bilaterally  Gastrointestinal: soft, non-tender, non-distended with normal bowel sounds  Musculoskeletal: No clubbing; no joint deformity   Neurologic: AOx3, Non-focal  Psychiatry: AAOx3, mood & affect appropriate  Skin: No rashes, ecchymoses, or cyanosis                          14.8   4.76  )-----------( 206      ( 12 Jul 2022 07:23 )             46.4     07-12    143  |  104  |  36<H>  ----------------------------<  101<H>  3.9   |  27  |  1.07    Ca    9.3      12 Jul 2022 07:23  Phos  3.4     07-12  Mg     2.2     07-12          New ECG(s): Personally reviewed    Echo:  TTE Echo Complete w/o Contrast w/ Doppler (07.05.22 @ 09:32)   PHYSICIAN INTERPRETATION:  Summary:   1. Moderately decreased global left ventricular systolic function.   2. Left ventricular ejection fraction, by visual estimation, is 40%.   3. Moderate global left ventricle hypokinesis.   4. Normal left ventricular internal cavity size.   5. The right ventricle is not well visualized, appears tohave normal   systolic function.   6. The left atrium is normal in size.   7. Mildly enlarged right atrium.   8. Mild thickening of the anterior and posterior mitral valve leaflets.   9. Mild mitral annular calcification.  10. Moderate mitral valve regurgitation.  11. Mild tricuspid regurgitation.  12. Mild aortic valve leaflet calcification. No aortic valve stenosis.  13. Mild to moderate aortic regurgitation.  14. Mildly dilated proximal ascending aorta (3.5 cm).  15. Moderate pleural effusion in the left lateral region.  16. There is no evidence of pericardial effusion.  [id number] Ranjeet Juarez MD Electronically signed on  7/5/2022 at 2:41:26 PM    Stress Testing:   Nuclear Stress Pharmacologic Multiple (07.08.22 @ 13:27)   IMPRESSION:  Abnormal SPECT Myocardial Perfusion Imaging post rest and  post vasodilator.  There is a moderate-sized reversible perfusion defect of the apical cap, apical inferior wall and mid inferior wall that is suggestive of ischemia. Mild diaphragmatic attenuation artifact reduces sensitivity of  these findings.   Abnormal left ventricular wall motion with ejection fraction of 48 % (normal: 50% or greater).  Global left ventricle hypokinesis.    GEORGIA JUAREZ   This document has been electronically signed. Jul 8 2022  2:47PM    Cath:  [facility] 7/12:  Cath Lab Report    Diagnostic Cardiologist:       Jason Rivera MD   Fellow:                        Ricki Paz MD   Referring Physician:           Victoriano Baker MD     Procedures Performed   Procedures:               1.    Arterial Access - Right Radial     2.    Diagnostic Coronary Angiography     Diagnostic Conclusions:   Non-obstructive coronary artery disease.   Recommend aggressive medical management of non-obstructive CAD as per  ACC/AHA guidelines. Findings relayed to patient  and patient's cardiologist.       Patient seen and examined at bedside.    Overnight Events: No acute events.     REVIEW OF SYSTEMS:  Constitutional:     [x ] negative [ ] fevers [ ] chills [ ] weight loss [ ] weight gain  HEENT:                  [x ] negative [ ] dry eyes [ ] eye irritation [ ] postnasal drip [ ] nasal congestion  CV:                         [ x] negative  [ ] chest pain [ ] orthopnea [ ] palpitations [ ] murmur  Resp:                     [ x] negative [ ] cough [ ] shortness of breath [ ] dyspnea [ ] wheezing [ ] sputum [ ]hemoptysis  GI:                          [ x] negative [ ] nausea [ ] vomiting [ ] diarrhea [ ] constipation [ ] abd pain [ ] dysphagia   :                        [ x] negative [ ] dysuria [ ] nocturia [ ] hematuria [ ] increased urinary frequency  Musculoskeletal: [x ] negative [ ] back pain [ ] myalgias [ ] arthralgias [ ] fracture  Skin:                       [ x] negative [ ] rash [ ] itch  Neurological:        [ ] negative [x ] headache [ ] dizziness [ ] syncope [ ] weakness [ ] numbness  Psychiatric:           [ x] negative [ ] anxiety [ ] depression  Endocrine:            [ x] negative [ ] diabetes [ ] thyroid problem  Heme/Lymph:      [ x] negative [ ] anemia [ ] bleeding problem  Allergic/Immune: [ x] negative [ ] itchy eyes [ ] nasal discharge [ ] hives [ ] angioedema  [ x] All other systems negative       Current Meds:  acetaminophen     Tablet .. 650 milliGRAM(s) Oral every 6 hours PRN  aspirin enteric coated 81 milliGRAM(s) Oral daily  atorvastatin 80 milliGRAM(s) Oral at bedtime  furosemide    Tablet 40 milliGRAM(s) Oral daily  losartan 50 milliGRAM(s) Oral daily  melatonin 3 milliGRAM(s) Oral at bedtime PRN  metoprolol succinate ER 25 milliGRAM(s) Oral daily      Vitals:  T(F): 97.8 (07-13), Max: 98.3 (07-12)  HR: 65 (07-13) (65 - 84)  BP: 112/73 (07-13) (93/53 - 123/67)  RR: 18 (07-13)  SpO2: 96% (07-13)  I&O's Summary    12 Jul 2022 07:01  -  13 Jul 2022 07:00  --------------------------------------------------------  IN: 474 mL / OUT: 0 mL / NET: 474 mL        Physical Exam:  Appearance: No acute distress; well appearing  Eyes: PERRL, EOMI, pink conjunctiva  HEENT: Normal oral mucosa  Cardiovascular: RRR, S1, S2, no murmurs, rubs, or gallops; no edema; no JVD  Respiratory: Clear to auscultation bilaterally  Gastrointestinal: soft, non-tender, non-distended with normal bowel sounds  Musculoskeletal: No clubbing; no joint deformity. RRA access site 2+ pulse, no hematoma   Neurologic: AOx3, Non-focal  Psychiatry: AAOx3, mood & affect appropriate  Skin: No rashes, ecchymoses, or cyanosis                          14.8   4.76  )-----------( 206      ( 12 Jul 2022 07:23 )             46.4     07-12    143  |  104  |  36<H>  ----------------------------<  101<H>  3.9   |  27  |  1.07    Ca    9.3      12 Jul 2022 07:23  Phos  3.4     07-12  Mg     2.2     07-12          New ECG(s): Personally reviewed    Echo:  TTE Echo Complete w/o Contrast w/ Doppler (07.05.22 @ 09:32)   PHYSICIAN INTERPRETATION:  Summary:   1. Moderately decreased global left ventricular systolic function.   2. Left ventricular ejection fraction, by visual estimation, is 40%.   3. Moderate global left ventricle hypokinesis.   4. Normal left ventricular internal cavity size.   5. The right ventricle is not well visualized, appears tohave normal   systolic function.   6. The left atrium is normal in size.   7. Mildly enlarged right atrium.   8. Mild thickening of the anterior and posterior mitral valve leaflets.   9. Mild mitral annular calcification.  10. Moderate mitral valve regurgitation.  11. Mild tricuspid regurgitation.  12. Mild aortic valve leaflet calcification. No aortic valve stenosis.  13. Mild to moderate aortic regurgitation.  14. Mildly dilated proximal ascending aorta (3.5 cm).  15. Moderate pleural effusion in the left lateral region.  16. There is no evidence of pericardial effusion.  [id number] Ranjeet Juarez MD Electronically signed on  7/5/2022 at 2:41:26 PM    Stress Testing:   Nuclear Stress Pharmacologic Multiple (07.08.22 @ 13:27)   IMPRESSION:  Abnormal SPECT Myocardial Perfusion Imaging post rest and  post vasodilator.  There is a moderate-sized reversible perfusion defect of the apical cap, apical inferior wall and mid inferior wall that is suggestive of ischemia. Mild diaphragmatic attenuation artifact reduces sensitivity of  these findings.   Abnormal left ventricular wall motion with ejection fraction of 48 % (normal: 50% or greater).  Global left ventricle hypokinesis.    GEORGIA JUAREZ   This document has been electronically signed. Jul 8 2022  2:47PM    Cath:  [facility] 7/12:  Cath Lab Report    Diagnostic Cardiologist:       Jason Rivera MD   Fellow:                        Ricki Paz MD   Referring Physician:           Victoriano Baker MD     Procedures Performed   Procedures:               1.    Arterial Access - Right Radial     2.    Diagnostic Coronary Angiography     Diagnostic Conclusions:   Non-obstructive coronary artery disease.   Recommend aggressive medical management of non-obstructive CAD as per  ACC/AHA guidelines. Findings relayed to patient  and patient's cardiologist.       Patient seen and examined at bedside.    Overnight Events: No acute events.     REVIEW OF SYSTEMS:  Constitutional:     [x ] negative [ ] fevers [ ] chills [ ] weight loss [ ] weight gain  HEENT:                  [x ] negative [ ] dry eyes [ ] eye irritation [ ] postnasal drip [ ] nasal congestion  CV:                         [ x] negative  [ ] chest pain [ ] orthopnea [ ] palpitations [ ] murmur  Resp:                     [ x] negative [ ] cough [ ] shortness of breath [ ] dyspnea [ ] wheezing [ ] sputum [ ]hemoptysis  GI:                          [ x] negative [ ] nausea [ ] vomiting [ ] diarrhea [ ] constipation [ ] abd pain [ ] dysphagia   :                        [ x] negative [ ] dysuria [ ] nocturia [ ] hematuria [ ] increased urinary frequency  Musculoskeletal: [x ] negative [ ] back pain [ ] myalgias [ ] arthralgias [ ] fracture  Skin:                       [ x] negative [ ] rash [ ] itch  Neurological:        [ ] negative [x ] headache [ ] dizziness [ ] syncope [ ] weakness [ ] numbness  Psychiatric:           [ x] negative [ ] anxiety [ ] depression  Endocrine:            [ x] negative [ ] diabetes [ ] thyroid problem  Heme/Lymph:      [ x] negative [ ] anemia [ ] bleeding problem  Allergic/Immune: [ x] negative [ ] itchy eyes [ ] nasal discharge [ ] hives [ ] angioedema  [ x] All other systems negative       Current Meds:  acetaminophen     Tablet .. 650 milliGRAM(s) Oral every 6 hours PRN  aspirin enteric coated 81 milliGRAM(s) Oral daily  atorvastatin 80 milliGRAM(s) Oral at bedtime  furosemide    Tablet 40 milliGRAM(s) Oral daily  losartan 50 milliGRAM(s) Oral daily  melatonin 3 milliGRAM(s) Oral at bedtime PRN  metoprolol succinate ER 25 milliGRAM(s) Oral daily      Vitals:  T(F): 97.8 (07-13), Max: 98.3 (07-12)  HR: 65 (07-13) (65 - 84)  BP: 112/73 (07-13) (93/53 - 123/67)  RR: 18 (07-13)  SpO2: 96% (07-13)  I&O's Summary    12 Jul 2022 07:01  -  13 Jul 2022 07:00  --------------------------------------------------------  IN: 474 mL / OUT: 0 mL / NET: 474 mL        Physical Exam:  Appearance: No acute distress; well appearing  Eyes: PERRL, EOMI, pink conjunctiva  HEENT: Normal oral mucosa  Cardiovascular: RRR, S1, S2, no murmurs, rubs, or gallops; no edema; no JVD  Respiratory: Clear to auscultation bilaterally  Gastrointestinal: soft, non-tender, non-distended with normal bowel sounds  Musculoskeletal: No clubbing; no joint deformity. RRA access site 2+ pulse, no hematoma, + bruise   Neurologic: AOx3, Non-focal  Psychiatry: AAOx3, mood & affect appropriate  Skin: No rashes, ecchymoses, or cyanosis                          14.8   4.76  )-----------( 206      ( 12 Jul 2022 07:23 )             46.4     07-12    143  |  104  |  36<H>  ----------------------------<  101<H>  3.9   |  27  |  1.07    Ca    9.3      12 Jul 2022 07:23  Phos  3.4     07-12  Mg     2.2     07-12          New ECG(s): Personally reviewed    Echo:  TTE Echo Complete w/o Contrast w/ Doppler (07.05.22 @ 09:32)   PHYSICIAN INTERPRETATION:  Summary:   1. Moderately decreased global left ventricular systolic function.   2. Left ventricular ejection fraction, by visual estimation, is 40%.   3. Moderate global left ventricle hypokinesis.   4. Normal left ventricular internal cavity size.   5. The right ventricle is not well visualized, appears tohave normal   systolic function.   6. The left atrium is normal in size.   7. Mildly enlarged right atrium.   8. Mild thickening of the anterior and posterior mitral valve leaflets.   9. Mild mitral annular calcification.  10. Moderate mitral valve regurgitation.  11. Mild tricuspid regurgitation.  12. Mild aortic valve leaflet calcification. No aortic valve stenosis.  13. Mild to moderate aortic regurgitation.  14. Mildly dilated proximal ascending aorta (3.5 cm).  15. Moderate pleural effusion in the left lateral region.  16. There is no evidence of pericardial effusion.  [id number] Ranjeet Juarez MD Electronically signed on  7/5/2022 at 2:41:26 PM    Stress Testing:   Nuclear Stress Pharmacologic Multiple (07.08.22 @ 13:27)   IMPRESSION:  Abnormal SPECT Myocardial Perfusion Imaging post rest and  post vasodilator.  There is a moderate-sized reversible perfusion defect of the apical cap, apical inferior wall and mid inferior wall that is suggestive of ischemia. Mild diaphragmatic attenuation artifact reduces sensitivity of  these findings.   Abnormal left ventricular wall motion with ejection fraction of 48 % (normal: 50% or greater).  Global left ventricle hypokinesis.    GEORGIA JUAREZ   This document has been electronically signed. Jul 8 2022  2:47PM    Cath:  [facility] 7/12:  Cath Lab Report    Diagnostic Cardiologist:       Jason Rivera MD   Fellow:                        Ricki Paz MD   Referring Physician:           Victoriano Baker MD     Procedures Performed   Procedures:               1.    Arterial Access - Right Radial     2.    Diagnostic Coronary Angiography     Diagnostic Conclusions:   Non-obstructive coronary artery disease.   Recommend aggressive medical management of non-obstructive CAD as per  ACC/AHA guidelines. Findings relayed to patient  and patient's cardiologist.       Patient seen and examined at bedside.    Overnight Events: No acute events.   No cardiac sxs; no CP, palps or dyspnea       Current Meds:  acetaminophen     Tablet .. 650 milliGRAM(s) Oral every 6 hours PRN  aspirin enteric coated 81 milliGRAM(s) Oral daily  atorvastatin 80 milliGRAM(s) Oral at bedtime  furosemide    Tablet 40 milliGRAM(s) Oral daily  losartan 50 milliGRAM(s) Oral daily  melatonin 3 milliGRAM(s) Oral at bedtime PRN  metoprolol succinate ER 25 milliGRAM(s) Oral daily    REVIEW OF SYSTEMS:  Constitutional:     [x ] negative [ ] fevers [ ] chills [ ] weight loss [ ] weight gain  HEENT:                  [x ] negative [ ] dry eyes [ ] eye irritation [ ] postnasal drip [ ] nasal congestion  CV:                         [ x] negative  [ ] chest pain [ ] orthopnea [ ] palpitations [ ] murmur  Resp:                     [ x] negative [ ] cough [ ] shortness of breath [ ] dyspnea [ ] wheezing [ ] sputum [ ]hemoptysis  GI:                          [ x] negative [ ] nausea [ ] vomiting [ ] diarrhea [ ] constipation [ ] abd pain [ ] dysphagia   :                        [ x] negative [ ] dysuria [ ] nocturia [ ] hematuria [ ] increased urinary frequency  Musculoskeletal: [x ] negative [ ] back pain [ ] myalgias [ ] arthralgias [ ] fracture  Skin:                       [ x] negative [ ] rash [ ] itch  Neurological:        [ ] negative [x ] headache [ ] dizziness [ ] syncope [ ] weakness [ ] numbness  Psychiatric:           [ x] negative [ ] anxiety [ ] depression  Endocrine:            [ x] negative [ ] diabetes [ ] thyroid problem  Heme/Lymph:      [ x] negative [ ] anemia [ ] bleeding problem  Allergic/Immune: [ x] negative [ ] itchy eyes [ ] nasal discharge [ ] hives [ ] angioedema  [ x] All other systems negative       Vitals:  T(F): 97.8 (07-13), Max: 98.3 (07-12)  HR: 65 (07-13) (65 - 84)  BP: 112/73 (07-13) (93/53 - 123/67)  RR: 18 (07-13)  SpO2: 96% (07-13)    Physical Exam:  Appearance: No acute distress; well appearing  Eyes: PERRL, EOMI, pink conjunctiva  HEENT: Normal oral mucosa  Cardiovascular: RRR, S1, S2, no murmurs, rubs, or gallops; no edema; no JVD  Respiratory: Clear to auscultation bilaterally  Gastrointestinal: soft, non-tender, non-distended with normal bowel sounds  Musculoskeletal: No clubbing; no joint deformity. RRA access site 2+ pulse, no hematoma, + bruise   Neurologic: AOx3, Non-focal  Psychiatry: AAOx3, mood & affect appropriate  Skin: No rashes, ecchymoses, or cyanosis      LABS:                      14.8   4.76  )-----------( 206      ( 12 Jul 2022 07:23 )             46.4     07-12  143  |  104  |  36<H>  ----------------------------<  101<H>  3.9   |  27  |  1.07    Ca    9.3      12 Jul 2022 07:23  Phos  3.4     07-12  Mg     2.2     07-12    :  TTE Echo Complete w/o Contrast w/ Doppler (07.05.22 @ 09:32)   PHYSICIAN INTERPRETATION:  Summary:   1. Moderately decreased global left ventricular systolic function.   2. Left ventricular ejection fraction, by visual estimation, is 40%.   3. Moderate global left ventricle hypokinesis.   4. Normal left ventricular internal cavity size.   5. The right ventricle is not well visualized, appears tohave normal   systolic function.   6. The left atrium is normal in size.   7. Mildly enlarged right atrium.   8. Mild thickening of the anterior and posterior mitral valve leaflets.   9. Mild mitral annular calcification.  10. Moderate mitral valve regurgitation.  11. Mild tricuspid regurgitation.  12. Mild aortic valve leaflet calcification. No aortic valve stenosis.  13. Mild to moderate aortic regurgitation.  14. Mildly dilated proximal ascending aorta (3.5 cm).  15. Moderate pleural effusion in the left lateral region.  16. There is no evidence of pericardial effusion.  [id number] Ranjeet Juarez MD Electronically signed on  7/5/2022 at 2:41:26 PM    Stress Testing:   Nuclear Stress Pharmacologic Multiple (07.08.22 @ 13:27)   IMPRESSION:  Abnormal SPECT Myocardial Perfusion Imaging post rest and  post vasodilator.  There is a moderate-sized reversible perfusion defect of the apical cap, apical inferior wall and mid inferior wall that is suggestive of ischemia. Mild diaphragmatic attenuation artifact reduces sensitivity of  these findings.   Abnormal left ventricular wall motion with ejection fraction of 48 % (normal: 50% or greater).  Global left ventricle hypokinesis.    GEORGIA JUAREZ   This document has been electronically signed. Jul 8 2022  2:47PM      Cath:  [facility] 7/12:  Cath Lab Report    Diagnostic Cardiologist:       Jason Rivera MD   Fellow:                        Ricki Paz MD   Referring Physician:           Victoriano Baker MD     Procedures Performed   Procedures:                 1.    Arterial Access - Right Radial   2.    Diagnostic Coronary Angiography     Diagnostic Conclusions:   Non-obstructive coronary artery disease.   Recommend aggressive medical management of non-obstructive CAD as per ACC/AHA guidelines. Findings relayed to patient and patient's cardiologist.

## 2022-07-13 NOTE — PROGRESS NOTE ADULT - ASSESSMENT
This is a 89yo F with HTN, who initially presented to the [facility] ED for progressively worsening dyspnea for weeks/months. Tx for CHF. Transferred to [facility] for C after abnormal stress test [facility].    1.  Non-obstructive CAD  - Nuclear stress test at  suggestive of apical inferior and mid inferior ischemia  - [facility] 7/12 with non-obstructive CAD  - Continue Aspirin 81mg, Atorvastatin 80mg qHS    2.  New onset HFrEF   - EF noted to be ~40% at    - Continue oral furosemide, metoprolol, losartan     3.  HTN  - continue metoprolol, losartan     Follow up with Cardiology ~2weeks from discharge      Trenton Satnoyo MD  Cardiology Fellow - PGY 5  Text or Call: [phone]  For all New Consults and Questions:  [email or web address]   Login: [email or web address] This is a 87yo F with HTN, who initially presented to the [facility] ED for progressively worsening dyspnea for weeks/months. Tx for CHF. Transferred to [facility] for C after abnormal stress test [facility].    1.  Non-obstructive CAD  - Nuclear stress test at  suggestive of apical inferior and mid inferior ischemia  - [facility] 7/12 with non-obstructive CAD  - Continue Aspirin 81mg, Atorvastatin 80mg qHS  - RRA site 2+ pulse, no hematoma, counseled on site monitoring outpatient     2.  New onset HFrEF   - EF noted to be ~40% at    - Continue oral furosemide, metoprolol, losartan     3.  HTN  - continue metoprolol, losartan     Follow up with Cardiology ~2weeks from discharge      Trenton Santoyo MD  Cardiology Fellow - PGY 5  Text or Call: [phone]  For all New Consults and Questions:  [email or web address]   Login: [email or web address] 87 yo F with HTN, who initially presented to the [facility] ED for progressively worsening dyspnea for weeks/months. Tx. for CHF.   Transferred to [facility] for C after abnormal stress test [facility].      REC:  1.  Non-obstructive CAD  - Nuclear stress test at  suggestive of apical inferior and mid inferior ischemia, however, [facility] 7/12 with non-obstructive CAD  - Continue aspirin 81mg qd & atorvastatin 80 mg qHS  - RRA site 2+ pulse, no hematoma, counseled on site monitoring outpatient     2.  New onset HFrEF   - EF noted to be ~40% at    - Continue oral furosemide, metoprolol, losartan     3.  HTN  - continue metoprolol, losartan     Patient should follow up with her private cardiologist ~2weeks from discharge      Trenton Santoyo MD  Cardiology Fellow - PGY 5  Text or Call: [phone]    Plan discussed with cardiology fellow.  Patient seen and examined.  Hx., exam and labs as above.  I agree with the assessment and recommendations. which I have reviewed and edited where appropriate.  Nahid Flood M.D.  Cardiology Attending, Consult Service  For Cardiology consults and questions, all Cardiology service information can be found 24/7 on [email or web address] - use password: [email or web address] to log in.

## 2022-07-13 NOTE — PROGRESS NOTE ADULT - SUBJECTIVE AND OBJECTIVE BOX
[facility] Division of Hospital Medicine  Liz Herrera MD  Pager (M-F, 5D-5P): [phone]  Other Times:  [phone]    Patient is a 88y old  Female who presents with a chief complaint of positive stress test, needs cath (11 Jul 2022 21:11)      SUBJECTIVE / OVERNIGHT EVENTS: Seen and examined at bedside. No acute events.   ADDITIONAL REVIEW OF SYSTEMS: negative    MEDICATIONS  (STANDING):  aspirin enteric coated 81 milliGRAM(s) Oral daily  atorvastatin 80 milliGRAM(s) Oral at bedtime  furosemide    Tablet 40 milliGRAM(s) Oral daily  losartan 50 milliGRAM(s) Oral daily  metoprolol succinate ER 25 milliGRAM(s) Oral daily    MEDICATIONS  (PRN):  acetaminophen     Tablet .. 650 milliGRAM(s) Oral every 6 hours PRN Temp greater or equal to 38C (100.4F), Mild Pain (1 - 3)  melatonin 3 milliGRAM(s) Oral at bedtime PRN Insomnia      CAPILLARY BLOOD GLUCOSE    PHYSICAL EXAM:  Vital Signs Last 24 Hrs  T(C): 36.3 (07-13-22 @ 11:05), Max: 36.8 (07-12-22 @ 21:02)  T(F): 97.4 (07-13-22 @ 11:05), Max: 98.2 (07-12-22 @ 21:02)  HR: 72 (07-13-22 @ 11:05) (65 - 73)  BP: 97/67 (07-13-22 @ 11:05) (97/67 - 112/73)  RR: 18 (07-13-22 @ 11:05) (18 - 18)  SpO2: 96% (07-13-22 @ 11:05) (93% - 96%)  Wt(kg): --    GENERAL: Well appearing, in NAD  EYES: EOMI, conjunctiva and sclera clear  ENT: moist mucosa, no pharyngeal erythema  NECK: supple, no JVD  LUNG: Clear to auscultation bilaterally; No rales, rhonchi, wheezing, or rubs.   CVS: Regular rate and rhythm; No murmurs, rubs, or gallops  ABDOMEN: Soft, non tender, nondistended. +Bowel sounds.  EXTREMITIES:  no edema, no cyanosis  NEURO:  Alert & Oriented X3,  No focal deficits  PSYCH: Normal affect, normal mood  MUSCULOSKELETAL: FROM, no joint swelling  Skin: warm and dry, normal color    LABS: reviewed

## 2022-07-13 NOTE — PHYSICAL THERAPY INITIAL EVALUATION ADULT - GAIT DISTANCE, PT EVAL
50 ft with RW and 50 ft with min assist  to cga x 1 2/2 unsteady w/o device, reaching for furniture/50 feet

## 2022-07-13 NOTE — PHYSICAL THERAPY INITIAL EVALUATION ADULT - LEVEL OF INDEPENDENCE: SIT/STAND, REHAB EVAL
Pt return from [address], [facility]  03/10/21 2755
Pt to ED with complaint of wheezing, shortness of breath while walking. Pt states she has a history of asthma. Pt received albuterol and combivent treatments per ems as well as 125mg of iv solumederol. Pt states she has also had a diffuse headache with nausea since waking up this am. Pt is alert and and oriented x4.       Ky Ramirez RN  03/10/21 [id number]
independent

## 2022-07-13 NOTE — PROGRESS NOTE ADULT - NSPROGADDITIONALINFOA_GEN_ALL_CORE
Dispo: likely home tomorrow
Stable for discharge to home today with outpatient followup  Discharge time: 45 minutes

## 2022-07-13 NOTE — DISCHARGE NOTE NURSING/CASE MANAGEMENT/SOCIAL WORK - PATIENT PORTAL LINK FT
You can access the FollowMyHealth Patient Portal offered by [facility] by registering at the following website: [email or web address]. By joining [employer]’s FollowMyHealth portal, you will also be able to view your health information using other applications (apps) compatible with our system.

## 2022-07-13 NOTE — PROGRESS NOTE ADULT - ASSESSMENT
88F with HTN admitted to [facility] for new heart failure with positive nuc stress test, transferred to [facility] for ischemic evaluation.      Problem/Plan - 1:  ·  Problem: HFrEF (heart failure with reduced ejection fraction).   ·  Plan: New HFrEF 40% with moderated decreased global LVSF with positive nuclear stress test concerning for ischemic cardiomyopathy  - Appreciate cardiology recs  - c/w GDMT - on toprol, losartan  - c/w lasix 40 PO daily  - strict I&Os, daily weights, monitor fluid status daily.     Problem/Plan - 2:  ·  Problem: CAD (coronary artery disease).  ·  Plan: Positive nuclear stress test concerning for ischemic cardiomyopathy  - s/p LHC today; f/u official results  - c/w aspiri- Monitor on tele     Problem/Plan - 3:  ·  Problem: Hypertension.   ·  Plan: - c/w toprol, losartan.     Problem/Plan - 4:  ·  Problem: Prophylactic measure.   ·  Plan: DVT: improve score 1, low risk  Diet: dash/tlc

## 2022-07-13 NOTE — PHYSICAL THERAPY INITIAL EVALUATION ADULT - PERTINENT HX OF CURRENT PROBLEM, REHAB EVAL
88F with HTN admitted to [facility] for SOB x 2 weeks and noted to have new heart failure with positive nuc stress test, transferred to [facility] for ischemic evaluation. A nuclear stress test was also suggestive of apical inferior and mid inferior ischemia and subsequently transferred to [facility] for coronary angiogram. TTE:  1. Mod decreased global LV systolic function.2. LVEF= 40%. 3. Mod global LV hypokinesis.4. Normal LV internal cavity size.

## 2022-07-13 NOTE — DISCHARGE NOTE NURSING/CASE MANAGEMENT/SOCIAL WORK - NSDCFUADDAPPT_GEN_ALL_CORE_FT
APPTS ARE READY TO BE MADE: [X] YES    Best Family or Patient Contact (if needed):    Additional Information about above appointments (if needed):    1: Please follow up with DR Escobar   2: Please follow up with cardiology clinic   3:     Other comments or requests:

## 2022-07-13 NOTE — PHYSICAL THERAPY INITIAL EVALUATION ADULT - PRECAUTIONS/LIMITATIONS, REHAB EVAL
5. normal RV systolic function.6. LA is normal in size.7. Mildly enlarged RA. 8. Mild thickening of the ant and post MV leaflets.9. Mild mitral annular calcification.10. Mod MVR.11. Mild TR.12. Mild aortic valve leaflet calcification. No AVS.13. Mild to mod AR.14. Mildly dilated proximal ascending aorta (3.5 cm).15. Mod pleural effusion in the L lateral region.16.NO evidence of pericardial effusion. Pt is s/p cardiac cath 7/12 with no intervention./cardiac precautions

## 2022-07-14 DIAGNOSIS — J90 PLEURAL EFFUSION, NOT ELSEWHERE CLASSIFIED: ICD-10-CM

## 2022-07-14 RX ORDER — BENZONATATE 200 MG/1
200 CAPSULE ORAL
Qty: 30 | Refills: 0 | Status: DISCONTINUED | COMMUNITY
Start: 2021-12-27 | End: 2022-07-14

## 2022-07-14 RX ORDER — METHOCARBAMOL 750 MG/1
750 TABLET, FILM COATED ORAL
Qty: 15 | Refills: 0 | Status: DISCONTINUED | COMMUNITY
Start: 2022-04-10 | End: 2022-07-14

## 2022-07-14 RX ORDER — AMOXICILLIN AND CLAVULANATE POTASSIUM 875; 125 MG/1; MG/1
875-125 TABLET, COATED ORAL TWICE DAILY
Refills: 0 | Status: DISCONTINUED | COMMUNITY
Start: 2022-04-18 | End: 2022-07-14

## 2022-07-14 RX ORDER — ATORVASTATIN CALCIUM 80 MG/1
80 TABLET, FILM COATED ORAL
Qty: 90 | Refills: 0 | Status: ACTIVE | COMMUNITY
Start: 2022-07-14

## 2022-07-14 RX ORDER — AMOXICILLIN 875 MG/1
875 TABLET, FILM COATED ORAL
Qty: 20 | Refills: 0 | Status: DISCONTINUED | COMMUNITY
Start: 2022-04-13 | End: 2022-07-14

## 2022-07-14 RX ORDER — HYDROCHLOROTHIAZIDE 12.5 MG/1
12.5 TABLET ORAL
Qty: 90 | Refills: 1 | Status: DISCONTINUED | COMMUNITY
Start: 2020-03-02 | End: 2022-07-14

## 2022-07-14 RX ORDER — IBUPROFEN 600 MG/1
600 TABLET, FILM COATED ORAL
Qty: 12 | Refills: 0 | Status: DISCONTINUED | COMMUNITY
Start: 2022-04-10 | End: 2022-07-14

## 2022-07-14 RX ORDER — ACETAMINOPHEN 325 MG/1
325 TABLET ORAL
Refills: 0 | Status: DISCONTINUED | COMMUNITY
Start: 2022-04-18 | End: 2022-07-14

## 2022-07-14 RX ORDER — LIDOCAINE 5% 700 MG/1
5 PATCH TOPICAL
Qty: 1 | Refills: 0 | Status: DISCONTINUED | COMMUNITY
Start: 2022-04-10 | End: 2022-07-14

## 2022-07-21 ENCOUNTER — NON-APPOINTMENT (OUTPATIENT)
Age: 87
End: 2022-07-21

## 2022-07-21 ENCOUNTER — APPOINTMENT (OUTPATIENT)
Dept: CARDIOLOGY | Facility: CLINIC | Age: 87
End: 2022-07-21

## 2022-07-27 ENCOUNTER — APPOINTMENT (OUTPATIENT)
Dept: INTERNAL MEDICINE | Facility: CLINIC | Age: 87
End: 2022-07-27

## 2022-07-27 VITALS
SYSTOLIC BLOOD PRESSURE: 120 MMHG | HEIGHT: 64 IN | TEMPERATURE: 97.8 F | OXYGEN SATURATION: 96 % | DIASTOLIC BLOOD PRESSURE: 78 MMHG | HEART RATE: 102 BPM | WEIGHT: 142 LBS | RESPIRATION RATE: 16 BRPM | BODY MASS INDEX: 24.24 KG/M2

## 2022-07-27 DIAGNOSIS — R06.02 SHORTNESS OF BREATH: ICD-10-CM

## 2022-07-27 DIAGNOSIS — U07.1 COVID-19: ICD-10-CM

## 2022-07-27 PROCEDURE — 99495 TRANSJ CARE MGMT MOD F2F 14D: CPT | Mod: CS

## 2022-08-02 PROBLEM — U07.1 COVID-19 VIRUS INFECTION: Status: ACTIVE | Noted: 2022-01-07

## 2022-08-02 PROBLEM — R06.02 SHORTNESS OF BREATH: Status: ACTIVE | Noted: 2022-07-14

## 2022-08-02 NOTE — PHYSICAL EXAM
[No Acute Distress] : no acute distress [Well Nourished] : well nourished [Well Developed] : well developed [Well-Appearing] : well-appearing [Normal Sclera/Conjunctiva] : normal sclera/conjunctiva [PERRL] : pupils equal round and reactive to light [EOMI] : extraocular movements intact [Normal Outer Ear/Nose] : the outer ears and nose were normal in appearance [Normal Oropharynx] : the oropharynx was normal [Normal TMs] : both tympanic membranes were normal [Normal Nasal Mucosa] : the nasal mucosa was normal [No JVD] : no jugular venous distention [No Lymphadenopathy] : no lymphadenopathy [Supple] : supple [Thyroid Normal, No Nodules] : the thyroid was normal and there were no nodules present [No Respiratory Distress] : no respiratory distress  [No Accessory Muscle Use] : no accessory muscle use [Clear to Auscultation] : lungs were clear to auscultation bilaterally [Normal Rate] : normal rate  [Regular Rhythm] : with a regular rhythm [Normal S1, S2] : normal S1 and S2 [No Murmur] : no murmur heard [No Carotid Bruits] : no carotid bruits [No Abdominal Bruit] : a ~M bruit was not heard ~T in the abdomen [No Varicosities] : no varicosities [Pedal Pulses Present] : the pedal pulses are present [No Edema] : there was no peripheral edema [No Palpable Aorta] : no palpable aorta [No Extremity Clubbing/Cyanosis] : no extremity clubbing/cyanosis [Declined Breast Exam] : declined breast exam  [Soft] : abdomen soft [Non Tender] : non-tender [Non-distended] : non-distended [No Masses] : no abdominal mass palpated [No HSM] : no HSM [Normal Bowel Sounds] : normal bowel sounds [No Hernias] : no hernias [Declined Rectal Exam] : declined rectal exam [Normal Supraclavicular Nodes] : no supraclavicular lymphadenopathy [Normal Axillary Nodes] : no axillary lymphadenopathy [Normal Posterior Cervical Nodes] : no posterior cervical lymphadenopathy [Normal Anterior Cervical Nodes] : no anterior cervical lymphadenopathy [Normal Inguinal Nodes] : no inguinal lymphadenopathy [Normal Femoral Nodes] : no femoral lymphadenopathy [No CVA Tenderness] : no CVA  tenderness [No Spinal Tenderness] : no spinal tenderness [Kyphosis] : no kyphosis [Scoliosis] : no scoliosis [No Joint Swelling] : no joint swelling [Grossly Normal Strength/Tone] : grossly normal strength/tone [No Rash] : no rash [No Skin Lesions] : no skin lesions [Acne] : no acne [Coordination Grossly Intact] : coordination grossly intact [No Focal Deficits] : no focal deficits [Normal Gait] : normal gait [Deep Tendon Reflexes (DTR)] : deep tendon reflexes were 2+ and symmetric [Speech Grossly Normal] : speech grossly normal [Memory Grossly Normal] : memory grossly normal [Normal Affect] : the affect was normal [Alert and Oriented x3] : oriented to person, place, and time [Normal Mood] : the mood was normal [Normal Insight/Judgement] : insight and judgment were intact

## 2022-08-02 NOTE — ASSESSMENT
[FreeTextEntry1] : Physical examination reveals a well-developed elderly woman in no acute distress at rest blood pressure 120/78 heart rate of 102 respirations at 16 oxygen saturation on room air was 96% HEENT was unremarkable chest was clear cardiovascular exam was regular with no extra heart sounds or murmurs abdomen was soft and nontender extremities showed no clubbing cyanosis or edema and neurologic exam was nonfocal patient being medically managed for her heart failure will follow back with her cardiologist of note patient is transesophageal echocardiogram showed moderate decreased LV function estimated ejection fraction of 40% moderate mitral regurgitation patient is up-to-date with her ophthalmologist she defers on colorectal cancer screening having had her last colonoscopy in September 2016 patient had complete blood test and April 2022

## 2022-08-02 NOTE — HEALTH RISK ASSESSMENT
[Never] : Never [Yes] : Yes [No falls in past year] : Patient reported no falls in the past year [0] : 2) Feeling down, depressed, or hopeless: Not at all (0) [PHQ-2 Negative - No further assessment needed] : PHQ-2 Negative - No further assessment needed [[id number]] : 0 [HIV test declined] : HIV test declined [Hepatitis C test declined] : Hepatitis C test declined

## 2022-08-02 NOTE — REVIEW OF SYSTEMS
[Fever] : no fever [Chills] : no chills [Fatigue] : fatigue [Hot Flashes] : no hot flashes [Night Sweats] : no night sweats [Recent Change In Weight] : ~T no recent weight change [Discharge] : no discharge [Pain] : no pain [Redness] : no redness [Dryness] : no dryness  [Vision Problems] : no vision problems [Itching] : no itching [Earache] : no earache [Hearing Loss] : no hearing loss [Nosebleed] : no nosebleeds [Hoarseness] : no hoarseness [Nasal Discharge] : no nasal discharge [Sore Throat] : no sore throat [Postnasal Drip] : no postnasal drip [Chest Pain] : no chest pain [Palpitations] : no palpitations [Leg Claudication] : no leg claudication [Lower Ext Edema] : lower extremity edema [Orthopnea] : no orthopnea [Shortness Of Breath] : no shortness of breath [Wheezing] : no wheezing [Cough] : no cough [Dyspnea on Exertion] : dyspnea on exertion [Abdominal Pain] : no abdominal pain [Nausea] : no nausea [Constipation] : no constipation [Diarrhea] : diarrhea [Vomiting] : no vomiting [Heartburn] : no heartburn [Melena] : no melena [Dysuria] : no dysuria [Incontinence] : no incontinence [Nocturia] : nocturia [Poor Libido] : libido not poor [Hematuria] : no hematuria [Frequency] : frequency [Vaginal Discharge] : no vaginal discharge [Dysmenorrhea] : no dysmenorrhea [Joint Pain] : no joint pain [Joint Stiffness] : joint stiffness [Joint Swelling] : no joint swelling [Muscle Weakness] : no muscle weakness [Muscle Pain] : no muscle pain [Back Pain] : back pain [Itching] : no itching [Mole Changes] : no mole changes [Nail Changes] : no nail changes [Hair Changes] : no hair changes [Skin Rash] : no skin rash [Headache] : no headache [Dizziness] : no dizziness [Fainting] : no fainting [Confusion] : no confusion [Memory Loss] : no memory loss [Unsteady Walking] : no ataxia [Suicidal] : not suicidal [Insomnia] : no insomnia [Anxiety] : anxiety [Depression] : no depression [Easy Bleeding] : no easy bleeding [Easy Bruising] : no easy bruising [Swollen Glands] : no swollen glands [Negative] : Heme/Lymph [FreeTextEntry5] : trace [FreeTextEntry9] : severe 9/10

## 2022-08-02 NOTE — HISTORY OF PRESENT ILLNESS
[Post-hospitalization from ___ Hospital] : Post-hospitalization from [unfilled] Hospital [Admitted on: ___] : The patient was admitted on [unfilled] [Discharged on ___] : discharged on [unfilled] [FreeTextEntry2] : 88-year-old woman comes to the office for transitional care after recent combined admission to [facility] with transfer to [facility] patient initially presented to [facility] with fluid overload and heart failure complaining of shortness of breath diuresis was performed patient had a nuclear stress test which was abnormal she was transferred for cardiac catheterization she remains at [facility] from July 11 through July 13 cardiac cath showed nonocclusive coronary artery disease medical management was entertained since discharge she denies shortness of breath her legs have been minimally swollen review of systems is significant for fatigue urinary frequency and nocturia tendency to have severe lower back pain and anxiety review of systems is otherwise noncontributory except for mild shortness of breath on exertion

## 2022-08-16 RX ORDER — FUROSEMIDE 40 MG/1
40 TABLET ORAL DAILY
Qty: 90 | Refills: 2 | Status: ACTIVE | COMMUNITY
Start: 2022-07-14 | End: 1900-01-01

## 2022-08-16 RX ORDER — ASPIRIN ENTERIC COATED TABLETS 81 MG 81 MG/1
81 TABLET, DELAYED RELEASE ORAL DAILY
Qty: 90 | Refills: 3 | Status: ACTIVE | COMMUNITY
Start: 2022-07-14 | End: 1900-01-01

## 2022-08-16 RX ORDER — METOPROLOL SUCCINATE 25 MG/1
25 TABLET, EXTENDED RELEASE ORAL
Qty: 1 | Refills: 2 | Status: ACTIVE | COMMUNITY
Start: 2022-07-14 | End: 1900-01-01

## 2023-01-06 ENCOUNTER — RX RENEWAL (OUTPATIENT)
Age: 88
End: 2023-01-06

## 2023-01-17 ENCOUNTER — APPOINTMENT (OUTPATIENT)
Dept: INTERNAL MEDICINE | Facility: CLINIC | Age: 88
End: 2023-01-17
Payer: MEDICARE

## 2023-01-17 VITALS
HEART RATE: 87 BPM | WEIGHT: 145 LBS | SYSTOLIC BLOOD PRESSURE: 116 MMHG | OXYGEN SATURATION: 96 % | HEIGHT: 64 IN | RESPIRATION RATE: 17 BRPM | TEMPERATURE: 97.5 F | DIASTOLIC BLOOD PRESSURE: 62 MMHG | BODY MASS INDEX: 24.75 KG/M2

## 2023-01-17 DIAGNOSIS — I25.10 ATHEROSCLEROTIC HEART DISEASE OF NATIVE CORONARY ARTERY W/OUT ANGINA PECTORIS: ICD-10-CM

## 2023-01-17 DIAGNOSIS — I50.9 HEART FAILURE, UNSPECIFIED: ICD-10-CM

## 2023-01-17 DIAGNOSIS — R53.83 OTHER FATIGUE: ICD-10-CM

## 2023-01-17 DIAGNOSIS — I10 ESSENTIAL (PRIMARY) HYPERTENSION: ICD-10-CM

## 2023-01-17 DIAGNOSIS — Z74.09 OTHER REDUCED MOBILITY: ICD-10-CM

## 2023-01-17 DIAGNOSIS — N39.0 URINARY TRACT INFECTION, SITE NOT SPECIFIED: ICD-10-CM

## 2023-01-17 DIAGNOSIS — Z78.9 OTHER REDUCED MOBILITY: ICD-10-CM

## 2023-01-17 DIAGNOSIS — M54.50 LOW BACK PAIN, UNSPECIFIED: ICD-10-CM

## 2023-01-17 PROCEDURE — 99215 OFFICE O/P EST HI 40 MIN: CPT

## 2023-01-17 RX ORDER — LOSARTAN POTASSIUM 50 MG/1
50 TABLET, FILM COATED ORAL DAILY
Qty: 30 | Refills: 5 | Status: DISCONTINUED | COMMUNITY
Start: 2022-07-14 | End: 2023-01-17

## 2023-01-18 ENCOUNTER — LABORATORY RESULT (OUTPATIENT)
Age: 88
End: 2023-01-18

## 2023-02-10 ENCOUNTER — INPATIENT (INPATIENT)
Facility: HOSPITAL | Age: 88
LOS: 5 days | DRG: 435 | End: 2023-02-16
Attending: HOSPITALIST | Admitting: HOSPITALIST
Payer: MEDICARE

## 2023-02-10 VITALS
HEIGHT: 63 IN | OXYGEN SATURATION: 96 % | RESPIRATION RATE: 17 BRPM | SYSTOLIC BLOOD PRESSURE: 119 MMHG | DIASTOLIC BLOOD PRESSURE: 72 MMHG | HEART RATE: 96 BPM | WEIGHT: 149.91 LBS | TEMPERATURE: 97 F

## 2023-02-10 DIAGNOSIS — E89.0 POSTPROCEDURAL HYPOTHYROIDISM: Chronic | ICD-10-CM

## 2023-02-10 DIAGNOSIS — R53.1 WEAKNESS: ICD-10-CM

## 2023-02-10 LAB
ALBUMIN SERPL ELPH-MCNC: 2 G/DL — LOW (ref 3.3–5)
ALP SERPL-CCNC: 132 U/L — HIGH (ref 40–120)
ALT FLD-CCNC: 18 U/L — SIGNIFICANT CHANGE UP (ref 10–45)
ANION GAP SERPL CALC-SCNC: 12 MMOL/L — SIGNIFICANT CHANGE UP (ref 5–17)
APPEARANCE UR: CLEAR — SIGNIFICANT CHANGE UP
AST SERPL-CCNC: 64 U/L — HIGH (ref 10–40)
BACTERIA # UR AUTO: ABNORMAL /HPF
BASOPHILS # BLD AUTO: 0.02 K/UL — SIGNIFICANT CHANGE UP (ref 0–0.2)
BASOPHILS NFR BLD AUTO: 0.2 % — SIGNIFICANT CHANGE UP (ref 0–2)
BILIRUB SERPL-MCNC: 1.5 MG/DL — HIGH (ref 0.2–1.2)
BILIRUB UR-MCNC: NEGATIVE — SIGNIFICANT CHANGE UP
BUN SERPL-MCNC: 32 MG/DL — HIGH (ref 7–23)
CALCIUM SERPL-MCNC: 8.2 MG/DL — LOW (ref 8.4–10.5)
CHLORIDE SERPL-SCNC: 99 MMOL/L — SIGNIFICANT CHANGE UP (ref 96–108)
CO2 SERPL-SCNC: 27 MMOL/L — SIGNIFICANT CHANGE UP (ref 22–31)
COLOR SPEC: YELLOW — SIGNIFICANT CHANGE UP
CREAT SERPL-MCNC: 1.66 MG/DL — HIGH (ref 0.5–1.3)
DIFF PNL FLD: ABNORMAL
EGFR: 29 ML/MIN/1.73M2 — LOW
EOSINOPHIL # BLD AUTO: 0 K/UL — SIGNIFICANT CHANGE UP (ref 0–0.5)
EOSINOPHIL NFR BLD AUTO: 0 % — SIGNIFICANT CHANGE UP (ref 0–6)
EPI CELLS # UR: SIGNIFICANT CHANGE UP
GLUCOSE SERPL-MCNC: 169 MG/DL — HIGH (ref 70–99)
GLUCOSE UR QL: NEGATIVE — SIGNIFICANT CHANGE UP
HCT VFR BLD CALC: 40.2 % — SIGNIFICANT CHANGE UP (ref 34.5–45)
HGB BLD-MCNC: 13.2 G/DL — SIGNIFICANT CHANGE UP (ref 11.5–15.5)
IMM GRANULOCYTES NFR BLD AUTO: 0.8 % — SIGNIFICANT CHANGE UP (ref 0–0.9)
KETONES UR-MCNC: NEGATIVE — SIGNIFICANT CHANGE UP
LEUKOCYTE ESTERASE UR-ACNC: ABNORMAL
LYMPHOCYTES # BLD AUTO: 0.39 K/UL — LOW (ref 1–3.3)
LYMPHOCYTES # BLD AUTO: 3 % — LOW (ref 13–44)
MCHC RBC-ENTMCNC: 29.3 PG — SIGNIFICANT CHANGE UP (ref 27–34)
MCHC RBC-ENTMCNC: 32.8 GM/DL — SIGNIFICANT CHANGE UP (ref 32–36)
MCV RBC AUTO: 89.1 FL — SIGNIFICANT CHANGE UP (ref 80–100)
MONOCYTES # BLD AUTO: 1.04 K/UL — HIGH (ref 0–0.9)
MONOCYTES NFR BLD AUTO: 8.1 % — SIGNIFICANT CHANGE UP (ref 2–14)
NEUTROPHILS # BLD AUTO: 11.26 K/UL — HIGH (ref 1.8–7.4)
NEUTROPHILS NFR BLD AUTO: 87.9 % — HIGH (ref 43–77)
NITRITE UR-MCNC: NEGATIVE — SIGNIFICANT CHANGE UP
NRBC # BLD: 0 /100 WBCS — SIGNIFICANT CHANGE UP (ref 0–0)
PH UR: 6 — SIGNIFICANT CHANGE UP (ref 5–8)
PLATELET # BLD AUTO: 220 K/UL — SIGNIFICANT CHANGE UP (ref 150–400)
POTASSIUM SERPL-MCNC: 5 MMOL/L — SIGNIFICANT CHANGE UP (ref 3.5–5.3)
POTASSIUM SERPL-SCNC: 5 MMOL/L — SIGNIFICANT CHANGE UP (ref 3.5–5.3)
PROT SERPL-MCNC: 6.7 G/DL — SIGNIFICANT CHANGE UP (ref 6–8.3)
PROT UR-MCNC: 30 MG/DL
RAPID RVP RESULT: SIGNIFICANT CHANGE UP
RBC # BLD: 4.51 M/UL — SIGNIFICANT CHANGE UP (ref 3.8–5.2)
RBC # FLD: 13.5 % — SIGNIFICANT CHANGE UP (ref 10.3–14.5)
RBC CASTS # UR COMP ASSIST: ABNORMAL /HPF (ref 0–4)
SARS-COV-2 RNA SPEC QL NAA+PROBE: SIGNIFICANT CHANGE UP
SODIUM SERPL-SCNC: 138 MMOL/L — SIGNIFICANT CHANGE UP (ref 135–145)
SP GR SPEC: 1.01 — SIGNIFICANT CHANGE UP (ref 1.01–1.02)
URATE CRY FLD QL MICRO: ABNORMAL
UROBILINOGEN FLD QL: 1
WBC # BLD: 12.81 K/UL — HIGH (ref 3.8–10.5)
WBC # FLD AUTO: 12.81 K/UL — HIGH (ref 3.8–10.5)
WBC UR QL: NEGATIVE /HPF — SIGNIFICANT CHANGE UP (ref 0–5)

## 2023-02-10 PROCEDURE — 99223 1ST HOSP IP/OBS HIGH 75: CPT

## 2023-02-10 PROCEDURE — 99285 EMERGENCY DEPT VISIT HI MDM: CPT

## 2023-02-10 PROCEDURE — 71045 X-RAY EXAM CHEST 1 VIEW: CPT | Mod: 26

## 2023-02-10 PROCEDURE — 93010 ELECTROCARDIOGRAM REPORT: CPT

## 2023-02-10 RX ORDER — METOPROLOL TARTRATE 50 MG
25 TABLET ORAL DAILY
Refills: 0 | Status: DISCONTINUED | OUTPATIENT
Start: 2023-02-10 | End: 2023-02-16

## 2023-02-10 RX ORDER — ATORVASTATIN CALCIUM 80 MG/1
80 TABLET, FILM COATED ORAL AT BEDTIME
Refills: 0 | Status: DISCONTINUED | OUTPATIENT
Start: 2023-02-10 | End: 2023-02-15

## 2023-02-10 RX ORDER — SODIUM CHLORIDE 9 MG/ML
500 INJECTION INTRAMUSCULAR; INTRAVENOUS; SUBCUTANEOUS ONCE
Refills: 0 | Status: COMPLETED | OUTPATIENT
Start: 2023-02-10 | End: 2023-02-10

## 2023-02-10 RX ORDER — SODIUM CHLORIDE 9 MG/ML
1000 INJECTION INTRAMUSCULAR; INTRAVENOUS; SUBCUTANEOUS
Refills: 0 | Status: DISCONTINUED | OUTPATIENT
Start: 2023-02-10 | End: 2023-02-13

## 2023-02-10 RX ORDER — INFLUENZA VIRUS VACCINE 15; 15; 15; 15 UG/.5ML; UG/.5ML; UG/.5ML; UG/.5ML
0.7 SUSPENSION INTRAMUSCULAR ONCE
Refills: 0 | Status: DISCONTINUED | OUTPATIENT
Start: 2023-02-10 | End: 2023-02-16

## 2023-02-10 RX ORDER — HEPARIN SODIUM 5000 [USP'U]/ML
5000 INJECTION INTRAVENOUS; SUBCUTANEOUS EVERY 8 HOURS
Refills: 0 | Status: DISCONTINUED | OUTPATIENT
Start: 2023-02-10 | End: 2023-02-15

## 2023-02-10 RX ORDER — ASPIRIN/CALCIUM CARB/MAGNESIUM 324 MG
81 TABLET ORAL DAILY
Refills: 0 | Status: DISCONTINUED | OUTPATIENT
Start: 2023-02-10 | End: 2023-02-16

## 2023-02-10 RX ORDER — ONDANSETRON 8 MG/1
4 TABLET, FILM COATED ORAL EVERY 8 HOURS
Refills: 0 | Status: DISCONTINUED | OUTPATIENT
Start: 2023-02-10 | End: 2023-02-16

## 2023-02-10 RX ORDER — LANOLIN ALCOHOL/MO/W.PET/CERES
3 CREAM (GRAM) TOPICAL AT BEDTIME
Refills: 0 | Status: DISCONTINUED | OUTPATIENT
Start: 2023-02-10 | End: 2023-02-16

## 2023-02-10 RX ORDER — ACETAMINOPHEN 500 MG
650 TABLET ORAL EVERY 6 HOURS
Refills: 0 | Status: DISCONTINUED | OUTPATIENT
Start: 2023-02-10 | End: 2023-02-16

## 2023-02-10 RX ADMIN — HEPARIN SODIUM 5000 UNIT(S): 5000 INJECTION INTRAVENOUS; SUBCUTANEOUS at 21:40

## 2023-02-10 RX ADMIN — SODIUM CHLORIDE 500 MILLILITER(S): 9 INJECTION INTRAMUSCULAR; INTRAVENOUS; SUBCUTANEOUS at 14:51

## 2023-02-10 RX ADMIN — ATORVASTATIN CALCIUM 80 MILLIGRAM(S): 80 TABLET, FILM COATED ORAL at 21:40

## 2023-02-10 RX ADMIN — SODIUM CHLORIDE 1000 MILLILITER(S): 9 INJECTION INTRAMUSCULAR; INTRAVENOUS; SUBCUTANEOUS at 13:00

## 2023-02-10 NOTE — H&P ADULT - NSHPLABSRESULTS_GEN_ALL_CORE
EKG: NSR 90s, non specific T wave abnormality which has also been present on prior EKG, poor r wave progression - reviewed by me personally    .                         13.2   12.81 )-----------( 220      ( 10 Feb 2023 12:40 )             40.2       02-10    138  |  99  |  32  ----------------------------<  169  5.0   |  27  |  1.66    Ca    8.2      10 Feb 2023 12:40    TPro  6.7  /  Alb  2.0  /  TBili  1.5  /  DBili  x   /  AST  64  /  ALT  18  /  AlkPhos  132  02-10      Urinalysis Basic - ( 10 Feb 2023 15:15 )    Color: Yellow / Appearance: Clear / S.015 / pH: x  Gluc: x / Ketone: Negative  / Bili: Negative / Urobili: 1   Blood: x / Protein: 30 mg/dL / Nitrite: Negative   Leuk Esterase: Trace / RBC: x / WBC x   Sq Epi: x / Non Sq Epi: x / Bacteria: x    < from: Xray Chest 1 View- PORTABLE-Urgent (02.10.23 @ 12:34) >    No acute pulmonary disease.

## 2023-02-10 NOTE — H&P ADULT - ASSESSMENT
88F with HTN, mild dementia, HLD, comes to hospital for weakness, found to be clinically dehydrated, noted to have ANGELIA    #Weakness in the setting of dehydration  #ANGELIA, pre-renal  -Hold ACEi and diuretic  -c/w gentle IVF overnight  -PT eval    #Essential HTN  -c/w Toprol  -hold ACEi as above    #Leukocytosis  -no signs/sympt  -likely hemoconcentrated   -repeat CBC in AM  -monitor off antibiotics     #DVT ppx: HSQ

## 2023-02-10 NOTE — PATIENT PROFILE ADULT - FALL HARM RISK - HARM RISK INTERVENTIONS

## 2023-02-10 NOTE — H&P ADULT - NSHPPHYSICALEXAM_GEN_ALL_CORE
Vital Signs Last 24 Hrs  T(F): 97.6 (10 Feb 2023 12:59), Max: 97.6 (10 Feb 2023 12:59)  HR: 96 (10 Feb 2023 11:35) (96 - 96)  BP: 119/72 (10 Feb 2023 11:35) (119/72 - 119/72)  RR: 17 (10 Feb 2023 11:35) (17 - 17)  SpO2: 96% (10 Feb 2023 11:35) (96% - 96%)    PHYSICAL EXAM:  GENERAL: NAD  HEAD:  Atraumatic, Normocephalic  EYES: EOMI, conjunctiva and sclera clear  ENMT: No gross hearing impairment, dry mucous membranes, Good dentition, no thrush  NECK: Supple, No JVD  CHEST/LUNG: Clear to auscultation bilaterally, good air entry, non-labored breathing  HEART: RRR; S1/S2  ABDOMEN: Soft, Nontender, Nondistended; Bowel sounds present  VASCULAR: Normal pulses, Normal capillary refill  EXTREMITIES: No calf tenderness, No cyanosis, No pitting edema  LYMPH: Normal; No lymphadenopathy noted  SKIN: Warm, Intact, No rashes noted; +tenting  PSYCH: Normal mood, Normal affect  NERVOUS SYSTEM:  A/O x 2-3, Good concentration; CN 2-12 intact, No focal deficits

## 2023-02-10 NOTE — H&P ADULT - HISTORY OF PRESENT ILLNESS
88F with HTN, mild dementia, HLD, comes to hospital for weakness. She has "not been herself" the last few days - went to PMD office today - noted to be "listless" - and was sent to ED. Patient was given IVF in the ED. Has much improved since. Per patient, her only complaint is a "cough" which has been going on for some time. No chest pain. No fevers. No sick contacts. No burning with urination. No N/V/D. Took patient off O2 - satting 100% currently on RA. Patient unreliable historian due to mild dementia.

## 2023-02-10 NOTE — ED PROVIDER NOTE - PHYSICAL EXAMINATION
General:     NAD, well-nourished, well-appearing  Eyes: PERRL, white sclera  Head:     NC/AT, EOMI  Pharynx: pharynx wnl, oral mucosa moist  Neck:     trachea midline  Lungs:     CTA b/l, Cough is triggered with each deep inspiration.  No wheeze audible.  No respiratory distress.  Oxygen saturation 91% on room air  CVS:     RRR  Abd:     +BS, s/nt/nd  Ext:   no deformities , No lower extremity edema  Skin: no rash  Neuro: AAOx3, no sensory/motor deficits

## 2023-02-10 NOTE — ED PROVIDER NOTE - CARE PLAN
1 Principal Discharge DX:	Generalized weakness  Secondary Diagnosis:	URI (upper respiratory infection)   Principal Discharge DX:	Generalized weakness  Secondary Diagnosis:	URI (upper respiratory infection)  Secondary Diagnosis:	ANGELIA (acute kidney injury)

## 2023-02-10 NOTE — ED ADULT TRIAGE NOTE - NS ED TRIAGE AVPU SCALE
89
Alert-The patient is alert, awake and responds to voice. The patient is oriented to time, place, and person. The triage nurse is able to obtain subjective information.

## 2023-02-10 NOTE — ED PROVIDER NOTE - CLINICAL SUMMARY MEDICAL DECISION MAKING FREE TEXT BOX
88-year-old female presents to the ED for weakness.  Patient with history of hypertension CHF dementia hyperlipidemia and according to family there aide noted that she has been having increasing cough over the past week.  But was most notable and concerning to them was that she has been exhibiting increasing weakness to the point where she requires additional assistance.  No fevers or chills reported.  No nausea or vomiting.  No recent falls.  They had a urology appointment today and while there they decided to go see their primary care doctor who sent him straight to the emergency department.  Cough is dry.  PCP Dr. Deal.  Exam as stated. O2 given. Improved to 99% with 3L via NC. Plan for xr and labs. Consider admission. Will reassess. 88-year-old female presents to the ED for weakness.  Patient with history of hypertension CHF dementia hyperlipidemia and according to family there aide noted that she has been having increasing cough over the past week.  But was most notable and concerning to them was that she has been exhibiting increasing weakness to the point where she requires additional assistance.  No fevers or chills reported.  No nausea or vomiting.  No recent falls.  They had a urology appointment today and while there they decided to go see their primary care doctor who sent him straight to the emergency department.  Cough is dry.  PCP Dr. Deal.  Exam as stated. O2 sat 93%. Suppl. O2 given. Improved to 99% with 3L via NC. Plan for xr and labs. Consider admission. Will reassess.    D/W Family. ANGELIA noted. Pending RVP. Plan for admission. Pt at increased risk for falling at home.

## 2023-02-10 NOTE — ED PROVIDER NOTE - UNABLE TO OBTAIN
Dementia history obtained from son and daughter in law. History obtained from Son and Daughter in Law.

## 2023-02-10 NOTE — ED PROVIDER NOTE - OBJECTIVE STATEMENT
88-year-old female presents to the ED for weakness.  Patient with history of hypertension CHF dementia hyperlipidemia and according to family there aide noted that she has been having increasing cough over the past week.  But was most notable and concerning to them was that she has been exhibiting increasing weakness to the point where she requires additional assistance.  No fevers or chills reported.  No nausea or vomiting.  No recent falls.  They had a urology appointment today and while there they decided to go see their primary care doctor who sent him straight to the emergency department.  Cough is dry.  PCP Dr. Deal

## 2023-02-11 PROBLEM — N39.0 UTI (URINARY TRACT INFECTION): Status: ACTIVE | Noted: 2022-04-18

## 2023-02-11 PROBLEM — Z74.09 IMPAIRED MOBILITY AND ADLS: Status: ACTIVE | Noted: 2023-02-03

## 2023-02-11 PROBLEM — I50.9 CONGESTIVE HEART FAILURE: Status: ACTIVE | Noted: 2022-08-02

## 2023-02-11 PROBLEM — M54.50 LOWER BACK PAIN: Status: ACTIVE | Noted: 2017-06-27

## 2023-02-11 PROBLEM — I50.9 HEART FAILURE: Status: ACTIVE | Noted: 2022-07-14

## 2023-02-11 LAB
ALBUMIN SERPL ELPH-MCNC: 2 G/DL — LOW (ref 3.3–5)
ALP SERPL-CCNC: 132 U/L — HIGH (ref 40–120)
ALT FLD-CCNC: 14 U/L — SIGNIFICANT CHANGE UP (ref 10–45)
ANION GAP SERPL CALC-SCNC: 15 MMOL/L — SIGNIFICANT CHANGE UP (ref 5–17)
AST SERPL-CCNC: 33 U/L — SIGNIFICANT CHANGE UP (ref 10–40)
BILIRUB DIRECT SERPL-MCNC: 0.4 MG/DL — HIGH (ref 0–0.3)
BILIRUB INDIRECT FLD-MCNC: 0.6 MG/DL — SIGNIFICANT CHANGE UP (ref 0.2–1)
BILIRUB SERPL-MCNC: 1 MG/DL — SIGNIFICANT CHANGE UP (ref 0.2–1.2)
BUN SERPL-MCNC: 39 MG/DL — HIGH (ref 7–23)
CALCIUM SERPL-MCNC: 8.2 MG/DL — LOW (ref 8.4–10.5)
CHLORIDE SERPL-SCNC: 100 MMOL/L — SIGNIFICANT CHANGE UP (ref 96–108)
CO2 SERPL-SCNC: 23 MMOL/L — SIGNIFICANT CHANGE UP (ref 22–31)
CREAT SERPL-MCNC: 1.48 MG/DL — HIGH (ref 0.5–1.3)
EGFR: 34 ML/MIN/1.73M2 — LOW
GLUCOSE SERPL-MCNC: 112 MG/DL — HIGH (ref 70–99)
HCT VFR BLD CALC: 43.3 % — SIGNIFICANT CHANGE UP (ref 34.5–45)
HGB BLD-MCNC: 13.9 G/DL — SIGNIFICANT CHANGE UP (ref 11.5–15.5)
MAGNESIUM SERPL-MCNC: 1.9 MG/DL — SIGNIFICANT CHANGE UP (ref 1.6–2.6)
MCHC RBC-ENTMCNC: 28.9 PG — SIGNIFICANT CHANGE UP (ref 27–34)
MCHC RBC-ENTMCNC: 32.1 GM/DL — SIGNIFICANT CHANGE UP (ref 32–36)
MCV RBC AUTO: 90 FL — SIGNIFICANT CHANGE UP (ref 80–100)
NRBC # BLD: 0 /100 WBCS — SIGNIFICANT CHANGE UP (ref 0–0)
PHOSPHATE SERPL-MCNC: 3.9 MG/DL — SIGNIFICANT CHANGE UP (ref 2.5–4.5)
PLATELET # BLD AUTO: 196 K/UL — SIGNIFICANT CHANGE UP (ref 150–400)
POTASSIUM SERPL-MCNC: 3 MMOL/L — LOW (ref 3.5–5.3)
POTASSIUM SERPL-SCNC: 3 MMOL/L — LOW (ref 3.5–5.3)
PROT SERPL-MCNC: 6.2 G/DL — SIGNIFICANT CHANGE UP (ref 6–8.3)
RBC # BLD: 4.81 M/UL — SIGNIFICANT CHANGE UP (ref 3.8–5.2)
RBC # FLD: 13.7 % — SIGNIFICANT CHANGE UP (ref 10.3–14.5)
SODIUM SERPL-SCNC: 138 MMOL/L — SIGNIFICANT CHANGE UP (ref 135–145)
WBC # BLD: 12.01 K/UL — HIGH (ref 3.8–10.5)
WBC # FLD AUTO: 12.01 K/UL — HIGH (ref 3.8–10.5)

## 2023-02-11 PROCEDURE — 99233 SBSQ HOSP IP/OBS HIGH 50: CPT

## 2023-02-11 RX ORDER — POTASSIUM CHLORIDE 20 MEQ
40 PACKET (EA) ORAL ONCE
Refills: 0 | Status: COMPLETED | OUTPATIENT
Start: 2023-02-11 | End: 2023-02-11

## 2023-02-11 RX ADMIN — Medication 650 MILLIGRAM(S): at 13:29

## 2023-02-11 RX ADMIN — HEPARIN SODIUM 5000 UNIT(S): 5000 INJECTION INTRAVENOUS; SUBCUTANEOUS at 21:58

## 2023-02-11 RX ADMIN — Medication 650 MILLIGRAM(S): at 14:00

## 2023-02-11 RX ADMIN — Medication 81 MILLIGRAM(S): at 11:41

## 2023-02-11 RX ADMIN — Medication 25 MILLIGRAM(S): at 05:27

## 2023-02-11 RX ADMIN — Medication 40 MILLIEQUIVALENT(S): at 15:24

## 2023-02-11 RX ADMIN — HEPARIN SODIUM 5000 UNIT(S): 5000 INJECTION INTRAVENOUS; SUBCUTANEOUS at 15:24

## 2023-02-11 RX ADMIN — ATORVASTATIN CALCIUM 80 MILLIGRAM(S): 80 TABLET, FILM COATED ORAL at 21:58

## 2023-02-11 RX ADMIN — SODIUM CHLORIDE 70 MILLILITER(S): 9 INJECTION INTRAMUSCULAR; INTRAVENOUS; SUBCUTANEOUS at 05:25

## 2023-02-11 RX ADMIN — HEPARIN SODIUM 5000 UNIT(S): 5000 INJECTION INTRAVENOUS; SUBCUTANEOUS at 05:25

## 2023-02-11 NOTE — DIETITIAN INITIAL EVALUATION ADULT - PERTINENT LABORATORY DATA
02-11    138  |  100  |  39<H>  ----------------------------<  112<H>  3.0<L>   |  23  |  1.48<H>    Ca    8.2<L>      11 Feb 2023 06:35  Phos  3.9     02-11  Mg     1.9     02-11    TPro  6.2  /  Alb  2.0<L>  /  TBili  1.0  /  DBili  0.4<H>  /  AST  33  /  ALT  14  /  AlkPhos  132<H>  02-11  A1C with Estimated Average Glucose Result: 6.1 % (07-12-22 @ 07:23)

## 2023-02-11 NOTE — DIETITIAN INITIAL EVALUATION ADULT - PERTINENT MEDS FT
MEDICATIONS  (STANDING):  aspirin enteric coated 81 milliGRAM(s) Oral daily  atorvastatin 80 milliGRAM(s) Oral at bedtime  heparin   Injectable 5000 Unit(s) SubCutaneous every 8 hours  influenza  Vaccine (HIGH DOSE) 0.7 milliLiter(s) IntraMuscular once  metoprolol succinate ER 25 milliGRAM(s) Oral daily  potassium chloride    Tablet ER 40 milliEquivalent(s) Oral once  sodium chloride 0.9%. 1000 milliLiter(s) (70 mL/Hr) IV Continuous <Continuous>    MEDICATIONS  (PRN):  acetaminophen     Tablet .. 650 milliGRAM(s) Oral every 6 hours PRN Temp greater or equal to 38C (100.4F), Mild Pain (1 - 3)  aluminum hydroxide/magnesium hydroxide/simethicone Suspension 30 milliLiter(s) Oral every 4 hours PRN Dyspepsia  melatonin 3 milliGRAM(s) Oral at bedtime PRN Insomnia  ondansetron Injectable 4 milliGRAM(s) IV Push every 8 hours PRN Nausea and/or Vomiting

## 2023-02-11 NOTE — REVIEW OF SYSTEMS
[Fever] : no fever [Chills] : no chills [Fatigue] : fatigue [Hot Flashes] : no hot flashes [Night Sweats] : no night sweats [Recent Change In Weight] : ~T no recent weight change [Discharge] : no discharge [Pain] : no pain [Redness] : no redness [Dryness] : no dryness  [Vision Problems] : no vision problems [Itching] : no itching [Earache] : no earache [Hearing Loss] : no hearing loss [Nosebleed] : no nosebleeds [Hoarseness] : no hoarseness [Nasal Discharge] : no nasal discharge [Sore Throat] : no sore throat [Postnasal Drip] : no postnasal drip [Chest Pain] : no chest pain [Palpitations] : no palpitations [Leg Claudication] : no leg claudication [Lower Ext Edema] : lower extremity edema [Orthopnea] : no orthopnea [Shortness Of Breath] : no shortness of breath [Wheezing] : no wheezing [Cough] : no cough [Dyspnea on Exertion] : dyspnea on exertion [Abdominal Pain] : no abdominal pain [Nausea] : no nausea [Constipation] : no constipation [Diarrhea] : diarrhea [Vomiting] : no vomiting [Heartburn] : no heartburn [Melena] : no melena [Dysuria] : no dysuria [Incontinence] : no incontinence [Nocturia] : nocturia [Poor Libido] : libido not poor [Hematuria] : no hematuria [Frequency] : frequency [Vaginal Discharge] : no vaginal discharge [Dysmenorrhea] : no dysmenorrhea [Joint Pain] : no joint pain [Joint Stiffness] : joint stiffness [Joint Swelling] : no joint swelling [Muscle Weakness] : no muscle weakness [Muscle Pain] : no muscle pain [Back Pain] : back pain [Mole Changes] : no mole changes [Nail Changes] : no nail changes [Hair Changes] : no hair changes [Skin Rash] : no skin rash [Headache] : no headache [Dizziness] : no dizziness [Fainting] : no fainting [Confusion] : no confusion [Memory Loss] : no memory loss [Unsteady Walking] : no ataxia [Suicidal] : not suicidal [Insomnia] : no insomnia [Anxiety] : anxiety [Depression] : no depression [Easy Bleeding] : no easy bleeding [Easy Bruising] : no easy bruising [Swollen Glands] : no swollen glands [Negative] : Heme/Lymph [FreeTextEntry5] : trace [FreeTextEntry6] : mild [FreeTextEntry9] : severe 9/10

## 2023-02-11 NOTE — PHYSICAL THERAPY INITIAL EVALUATION ADULT - PERTINENT HX OF CURRENT PROBLEM, REHAB EVAL
Pt is an 88 year old female sent in by PMD after noted to be "listless." Pt also with c/o cough. Pt was given IVF in ED. Found to be clinically dehydrated, noted to have ANGELIA. PMH significant for HTN, mild dementia, HLD.

## 2023-02-11 NOTE — PHYSICAL THERAPY INITIAL EVALUATION ADULT - PATIENT PROFILE REVIEW, REHAB EVAL
PT evaluate and treat orders received: Ambulate with assistance. Consult with RN Betty SCHULER, pt may participate in PT evaluation./yes

## 2023-02-11 NOTE — PHYSICAL EXAM
[No Acute Distress] : no acute distress [Well Nourished] : well nourished [Well Developed] : well developed [Well-Appearing] : well-appearing [Normal Sclera/Conjunctiva] : normal sclera/conjunctiva [PERRL] : pupils equal round and reactive to light [EOMI] : extraocular movements intact [Normal Outer Ear/Nose] : the outer ears and nose were normal in appearance [Normal Oropharynx] : the oropharynx was normal [Normal TMs] : both tympanic membranes were normal [Normal Nasal Mucosa] : the nasal mucosa was normal [No JVD] : no jugular venous distention [No Lymphadenopathy] : no lymphadenopathy [Supple] : supple [Thyroid Normal, No Nodules] : the thyroid was normal and there were no nodules present [No Respiratory Distress] : no respiratory distress  [No Accessory Muscle Use] : no accessory muscle use [Clear to Auscultation] : lungs were clear to auscultation bilaterally [Normal Rate] : normal rate  [Regular Rhythm] : with a regular rhythm [Normal S1, S2] : normal S1 and S2 [No Murmur] : no murmur heard [No Carotid Bruits] : no carotid bruits [No Abdominal Bruit] : a ~M bruit was not heard ~T in the abdomen [No Varicosities] : no varicosities [Pedal Pulses Present] : the pedal pulses are present [No Edema] : there was no peripheral edema [No Palpable Aorta] : no palpable aorta [No Extremity Clubbing/Cyanosis] : no extremity clubbing/cyanosis [Declined Breast Exam] : declined breast exam  [Soft] : abdomen soft [Non Tender] : non-tender [Non-distended] : non-distended [No Masses] : no abdominal mass palpated [No HSM] : no HSM [Normal Bowel Sounds] : normal bowel sounds [No Hernias] : no hernias [Declined Rectal Exam] : declined rectal exam [No CVA Tenderness] : no CVA  tenderness [No Spinal Tenderness] : no spinal tenderness [Kyphosis] : no kyphosis [Scoliosis] : no scoliosis [No Joint Swelling] : no joint swelling [Grossly Normal Strength/Tone] : grossly normal strength/tone [No Rash] : no rash [No Skin Lesions] : no skin lesions [Acne] : no acne [Coordination Grossly Intact] : coordination grossly intact [No Focal Deficits] : no focal deficits [Normal Gait] : normal gait [Deep Tendon Reflexes (DTR)] : deep tendon reflexes were 2+ and symmetric [Speech Grossly Normal] : speech grossly normal [Memory Grossly Normal] : memory grossly normal [Normal Affect] : the affect was normal [Alert and Oriented x3] : oriented to person, place, and time [Normal Mood] : the mood was normal [Normal Insight/Judgement] : insight and judgment were intact

## 2023-02-11 NOTE — DIETITIAN INITIAL EVALUATION ADULT - WEIGHT (LBS)
Parent noted dermoid cyst located on left side of forehead is larger than usual. Noted about 1700 today. No trauma noted, eating and drinking per usual.   
150

## 2023-02-11 NOTE — PHYSICAL THERAPY INITIAL EVALUATION ADULT - PREDICTED DURATION OF THERAPY (DAYS/WKS), PT EVAL
[facility]  Daily Note   Name:  Peng Meneses  Medical Record Number: [id number]   Note Date: 2021                                              Date/Time:  2021 10:20:00   DOL: 4  Pos-Mens Age:  30wk 0d  Birth Gest: 29wk 3d   2021  Birth Weight:  1338 (gms)  Daily Physical Exam   Today's Weight: 1174 (gms)  Chg 24 hrs: -2  Chg 7 days:  --   Temperature Heart Rate Resp Rate BP - Sys BP - Kathleen BP - Mean O2 Sats   37.1 151 49-64 58 38 43 99  Intensive cardiac and respiratory monitoring, continuous and/or frequent vital sign monitoring.   Bed Type:  Incubator   General:  Content female in NAD   Head/Neck:  Normocephalic.  Anterior fontanelle soft and flat.  Suture lines overriding.  Palate intact. bCPAP in  place.   Chest:  Chest symmetrical. Clear breath sounds bilaterally with fair air exchange. Mild SC retractions. No  flaring or grunting.  Clavicles intact.   Heart:  Regular rate and rhythm; no murmur heard; brachial  and  femoral pulses 2-3+ and equal bilaterally; CFT  2-3 seconds.   Abdomen:  Abdomen soft and flat with diminished bowel sounds.  No masses or organomegaly palpated.    Umbilicus C/D/I iwth no erythema   Genitalia:  Normal  external genitalia.    Anus patent.  No sacral dimple.   Extremities  Symmetrical movements; no hip dislocations detected; no abnormalities noted.   Neurologic:  Responsive with exam.  Muscle tone appropriate for gestation.  Physiologic reflexes intact.  Spine  straight without midline lesion noted.   Skin:  Skin smooth, pink, warm, and intact. Some bruising to extremities. No rashes, birthmarks, or lesions  noted. PICC site C/D/I with no erythema or edema.  Medications   Active Start Date Start Time Stop Date Dur(d) Comment   Caffeine Citrate 2021 5  Respiratory Support   Respiratory Support Start Date Stop Date Dur(d)                                       Comment   Nasal CPAP 2021 5  Settings for Nasal  CPAP  FiO2 CPAP  0.21 5   Procedures   Start Date Stop Date Dur(d)Clinician Comment   Peripherally Inserted Central 2021 4 RN 26 g Argon PICC inserted  Catheter into L cephalic vein.   Labs   Chem1 Time Na K Cl CO2 BUN Cr Glu BS Glu Ca   2021 05:35 134 4.7 106 15 35 0.74 89 9.9   Liver Function Time T Bili D Bili Blood Type Flip AST ALT GGT LDH NH3 Lactate   2021 05:35 3.3 0.3 21 6     Chem2 Time iCa Osm Phos Mg TG Alk Phos T Prot Alb Pre Alb   2021 05:35 241 4.9 3.5  Cultures  Active   Type Date Results Organism   Blood 2021 Pending  Intake/Output   Weight Used for calculations:1338 grams  Actual Intake   Fluid Type Mark/oz Dex % Prot g/kg Prot g/100mL Amount Comment  TPN 10 136  SMOFlipids 13  Breast Milk-Kobe 20 24  Planned Intake Prot Prot feeds/  Fluid Type Mark/oz Dex % g/kg g/100mL Amt mL/feed day mL/hr mL/kg/day Comment    TPN 10 146 6.08 109.12  Breast Milk-Kobe 20 24 3 8 17.94 2  Output   Urine Amount:63 mL 2.0 mL/kg/hr Calculation:24 hrs  Total Output:   63 mL 2.0 mL/kg/hr 47.1 mL/kg/day Calculation:24 hrs  Stools: 0  Nutritional Support   Diagnosis Start Date End Date  Nutritional Support 2021   History   NPO on admission. Initial glucose 133. vTPN started at 80 ml/kg/d. TPN given 1/10-present. IL 1/11-present. Trophic  feeds started 1/10.     Assessment   Gained 16g, voiding. No stool since birth. Tolerating trophic feeds. Na 134 K 4.7 Bicarb 15 Glucose 89.    Plan   Trophic feeds of 3ml Q 3 huors = 18 ml/kg/day.   TF =140 ml/kg/d.   TPN/SMOF.  chem panel in am   Metabolic acidosis - increasing acetate in TPN continue to monitor.   Na 134, Increase sodium in TPN   Central Vascular Access   Diagnosis Start Date End Date  Central Vascular Access 2021   History   PICC placed 1/11 for IV nutrition. Tip on 1/12 at T7   Plan   Continue PICC line for IV nutrition, monitor tip placement (next film due 1/19).  Jaundice of Prematurity   Diagnosis Start Date End Date  Jaundice  of Prematurity 2021   History   Mild arm bruising. MBT O+. Infant type O. Phototherapy given 1/11-1/14. Peak  Bilirubin level  5.2 on 1/12. Most recent  level was 3.3/0.3 on 1/14.    Plan   Discontinue Phototherapy and repeat level in am  Respiratory Distress Syndrome   Diagnosis Start Date End Date  Respiratory Distress Syndrome 2021   History   One dose of BMTZ just prior to delivery. Admitted on bCPAP5, FiO2 in high 30s. CXR c/w RDS. Given Curosurf and  extubated to bCPAP5, able to wean to 23%.   Plan   Continue bCPAP5. Monitor work of breathing and FiO2.   Apnea of Prematurity   Diagnosis Start Date End Date  Apnea of Prematurity 2021   History   Loaded with caffeine on admission. Last apnea on 1/10   Assessment   No spells   Plan   Continue caffeine and monitor for episodes.    Infectious Screen <=28D   Diagnosis Start Date End Date  Infectious Screen <=28D 2021   History   GBS negative. PTL with PROM. Mom with 3 day h/o diarrhea PTD. Blood culture sent and started A/G. CBC remarkable  for mild neutropenia, no left shift - c/w maternal hypertension.   Plan    Completed A/G for 36h   Culture remains no growth.   At risk for Intraventricular Hemorrhage   Diagnosis Start Date End Date  At risk for Intraventricular Hemorrhage 2021  Neuroimaging   Date Type Grade-L Grade-R   2021 Cranial Ultrasound No Bleed No Bleed  2021 Cranial Ultrasound   History   29w3d   Plan   Repeat HUS on 1/18  Prematurity 2600-8686 gm   Diagnosis Start Date End Date  Prematurity 5039-9609 gm 2021   History   29w3d.  Placenta pathology: Dichorionic, Diamiotic twin placenta 441g. Twin A and B placenta's with 3 vessel cord, placental  parenchyma with increase cellularity, synctial knows with inter/intra villous fibrin deposition.    Plan   Provide developementally appropriate care.  Twin Gestation   Diagnosis Start Date End Date  Twin Gestation 2021   History   di-di. concordant.  ARIA.   Plan   Developemental cares and screening per EGA guidelines  Parental Support   Diagnosis Start Date End Date  Parental Support 2021   History   Parents . First babies. Father is pharmacist. Live in [facility]. Father updated by Dr Richmond and consents signed.     Admit Conference scheduled for 1/16. Parents updated at bedside by Dr. Vyas    Plan   continue to support.  At risk for Retinopathy of Prematurity   Diagnosis Start Date End Date  At risk for Retinopathy of Prematurity 2021   Plan   ROP screening per AAP guidelines.  Respiratory Syncytial Virus - at risk for   Diagnosis Start Date End Date  Respiratory Syncytial Virus - at risk for 2021   History   29w3d   Plan   Qualifies for synagis.  Health Maintenance   Maternal Labs  RPR/Serology: Non-Reactive  HIV: Negative  Rubella: Immune  GBS:  Negative  HBsAg:  Negative  ___________________________________________  Alondra Vyas MD   Until discharge

## 2023-02-11 NOTE — ASSESSMENT
[FreeTextEntry1] : Physical exam shows a well-developed elderly woman in no acute distress blood pressure 116/62 height 5 feet 4 inches weight 145 pounds BMI 24.89 temperature 97.5 °F heart rate of 87 respiratory rate of 17 oxygen saturation on room air is 96% HEENT was unremarkable chest was clear cardiovascular exam was regular with no extra heart sounds or murmurs abdomen was soft extremities showed trace lower extremity edema neurologic exam was nonfocal patient because of her fatigue and immobility was recommended to see a neurologist several names were given note patient had a cardiac catheterization after his stress test in July 2022 results of which showed nonocclusive coronary artery disease patient defers on colonoscopies does see her ophthalmologist on a regular basis her blood pressure is adequately controlled at the present visit her leg edema and heart failure has been minimal on her present diuretic she was given a slip for complete blood test including CBC full chemistries lipid profile thyroid profile urinalysis CRP vitamins D3 and B12 hemoglobin A1c

## 2023-02-11 NOTE — DIETITIAN INITIAL EVALUATION ADULT - ORAL INTAKE PTA/DIET HISTORY
As per pt's son at the bed side, pt had PTA a regular diet w/o restrictions, eats 3 meals a day. Pt does not take supplements or vitamins at home.

## 2023-02-11 NOTE — HISTORY OF PRESENT ILLNESS
[FreeTextEntry1] : 88-year-old woman comes to the office for follow-up to review her medications and discuss her overall health recent issues with fatigue and impaired mobility [de-identified] : Comes to the office for follow-up with a history of congestive heart failure nonocclusive coronary artery disease chronic fatigue impaired mobility and ADLs hypertension and occasional lower back pain review of systems is significant for fatigue lower extremity edema mild dyspnea on exertion urinary frequency and nocturia moderate to severe back pain and anxiety remaining review of systems is noncontributory

## 2023-02-11 NOTE — DIETITIAN INITIAL EVALUATION ADULT - ADD RECOMMEND
Encourage pt to complete her meals and supplements.   Encourage fluids intake.   Honor pt's food preferences as feasible with prescribed diet.   Obtain and record PO intake and weights daily

## 2023-02-11 NOTE — HEALTH RISK ASSESSMENT
[Yes] : Yes [No falls in past year] : Patient reported no falls in the past year [0] : 2) Feeling down, depressed, or hopeless: Not at all (0) [PHQ-2 Negative - No further assessment needed] : PHQ-2 Negative - No further assessment needed [de-identified] : Occasionally [TUW3Iohof] : 0 [Never] : Never

## 2023-02-11 NOTE — PHYSICAL THERAPY INITIAL EVALUATION ADULT - GENERAL OBSERVATIONS, REHAB EVAL
Pt received semisupine in bed, +primafit, +heplock, in NAD. Pt agreeable to participate in PT evaluation. Pt left semisupine in bed, all lines intact and RN aware.

## 2023-02-11 NOTE — DIETITIAN INITIAL EVALUATION ADULT - NSFNSPHYEXAMSKINFT_GEN_A_CORE
Pressure Injury 1: Right:,buttocks, Stage I  Pressure Injury 2: Left:, Stage I  Pressure Injury 3: none, none  Pressure Injury 4: none, none  Pressure Injury 5: none, none  Pressure Injury 6: none, none  Pressure Injury 7: none, none  Pressure Injury 8: none, none  Pressure Injury 9: none, none  Pressure Injury 10: none, none  Pressure Injury 11: none, none, Pressure Injury 1: Right:,buttocks, Stage I  Pressure Injury 2: Left:, Stage I  Pressure Injury 3: none, none  Pressure Injury 4: none, none  Pressure Injury 5: none, none  Pressure Injury 6: none, none  Pressure Injury 7: none, none  Pressure Injury 8: none, none  Pressure Injury 9: none, none  Pressure Injury 10: none, none  Pressure Injury 11: none, none

## 2023-02-11 NOTE — DIETITIAN INITIAL EVALUATION ADULT - OTHER INFO
H&P: 87 y/o F with HTN, mild dementia, HLD, comes to hospital for weakness, found to be clinically dehydrated, noted to have ANGELIA.     H&P: 89 y/o F with HTN, mild dementia, HLD, comes to hospital for weakness, found to be clinically dehydrated, noted to have ANGELIA.    Pt reports good appetite. PO intake ~75% of the meals. Pt feeds self, reports no chewing/swallowing difficulties. NKFA. Denies N/V/C/D. Last BM: 2/11. UBW: 150 Lbs, current weight 149.9 Lbs. No edema. Skin w/ PI on patt buttock stage 1. Explained son and pt benefits of Ensure High Protein to prevent skin breakdown. Pt was agreeable to try it. Food preferences updated. Menu provided and ordering procedures reviewed.

## 2023-02-11 NOTE — PROGRESS NOTE ADULT - SUBJECTIVE AND OBJECTIVE BOX
Patient is a 88y old  Female who presents with a chief complaint of Weakness (10 Feb 2023 16:19)    No events overnight   Reports feeling improved  Denies chest pain, SOB, abd pain  Patient seen and examined at bedside.    ALLERGIES:  No Known Allergies    MEDICATIONS  (STANDING):  aspirin enteric coated 81 milliGRAM(s) Oral daily  atorvastatin 80 milliGRAM(s) Oral at bedtime  heparin   Injectable 5000 Unit(s) SubCutaneous every 8 hours  influenza  Vaccine (HIGH DOSE) 0.7 milliLiter(s) IntraMuscular once  metoprolol succinate ER 25 milliGRAM(s) Oral daily  potassium chloride    Tablet ER 40 milliEquivalent(s) Oral once  sodium chloride 0.9%. 1000 milliLiter(s) (70 mL/Hr) IV Continuous <Continuous>    MEDICATIONS  (PRN):  acetaminophen     Tablet .. 650 milliGRAM(s) Oral every 6 hours PRN Temp greater or equal to 38C (100.4F), Mild Pain (1 - 3)  aluminum hydroxide/magnesium hydroxide/simethicone Suspension 30 milliLiter(s) Oral every 4 hours PRN Dyspepsia  melatonin 3 milliGRAM(s) Oral at bedtime PRN Insomnia  ondansetron Injectable 4 milliGRAM(s) IV Push every 8 hours PRN Nausea and/or Vomiting    Vital Signs Last 24 Hrs  T(F): 97.6 (2023 05:12), Max: 98.6 (10 Feb 2023 17:13)  HR: 84 (2023 05:12) (71 - 96)  BP: 125/84 (2023 05:12) (104/70 - 125/84)  RR: 20 (2023 05:12) (17 - 22)  SpO2: 96% (2023 05:12) (95% - 98%)  I&O's Summary    BMI (kg/m2): 26.6 (02-10-23 @ 11:35)  PHYSICAL EXAM:  General: NAD, A/O x 3  ENT: MMM, no scleral icterus  Neck: Supple, No JVD, no thyroidomegaly  Lungs: Clear to auscultation bilaterally, no wheezes, no rales, no rhonchi, good inspiratory effort  Cardio: RRR, S1/S2, No murmurs  Abdomen: Soft, Nontender, Nondistended; Bowel sounds present  Extremities: No calf tenderness, No pitting edema, no skin changes    LABS:                        13.9   12.01 )-----------( 196      ( 2023 06:35 )             43.3       02-11    138  |  100  |  39  ----------------------------<  112  3.0   |  23  |  1.48    Ca    8.2      2023 06:35  Phos  3.9     02-11  Mg     1.9     -11    TPro  6.2  /  Alb  2.0  /  TBili  1.0  /  DBili  0.4  /  AST  33  /  ALT  14  /  AlkPhos  132  02-11     Urinalysis Basic - ( 10 Feb 2023 15:15 )    Color: Yellow / Appearance: Clear / S.015 / pH: x  Gluc: x / Ketone: Negative  / Bili: Negative / Urobili: 1   Blood: x / Protein: 30 mg/dL / Nitrite: Negative   Leuk Esterase: Trace / RBC: 11-25 /HPF / WBC Negative /HPF   Sq Epi: x / Non Sq Epi: Neg.-Few / Bacteria: Few /HPF    COVID-19 PCR: NotDetec (22 @ 16:25)

## 2023-02-11 NOTE — PHYSICAL THERAPY INITIAL EVALUATION ADULT - ADDITIONAL COMMENTS
Pt is a somewhat reliable historian, please refer to care coordination assessment when available. Pt reports living on the first floor of a two-family home. Pt reports living alone, but son lives next door. Prior to admission, pt ambulated independently.

## 2023-02-12 ENCOUNTER — TRANSCRIPTION ENCOUNTER (OUTPATIENT)
Age: 88
End: 2023-02-12

## 2023-02-12 LAB
ANION GAP SERPL CALC-SCNC: 12 MMOL/L — SIGNIFICANT CHANGE UP (ref 5–17)
ANION GAP SERPL CALC-SCNC: 12 MMOL/L — SIGNIFICANT CHANGE UP (ref 5–17)
BUN SERPL-MCNC: 38 MG/DL — HIGH (ref 7–23)
BUN SERPL-MCNC: 39 MG/DL — HIGH (ref 7–23)
CALCIUM SERPL-MCNC: 8.3 MG/DL — LOW (ref 8.4–10.5)
CALCIUM SERPL-MCNC: 8.4 MG/DL — SIGNIFICANT CHANGE UP (ref 8.4–10.5)
CHLORIDE SERPL-SCNC: 102 MMOL/L — SIGNIFICANT CHANGE UP (ref 96–108)
CHLORIDE SERPL-SCNC: 103 MMOL/L — SIGNIFICANT CHANGE UP (ref 96–108)
CO2 SERPL-SCNC: 25 MMOL/L — SIGNIFICANT CHANGE UP (ref 22–31)
CO2 SERPL-SCNC: 26 MMOL/L — SIGNIFICANT CHANGE UP (ref 22–31)
CREAT SERPL-MCNC: 1.44 MG/DL — HIGH (ref 0.5–1.3)
CREAT SERPL-MCNC: 1.57 MG/DL — HIGH (ref 0.5–1.3)
CULTURE RESULTS: SIGNIFICANT CHANGE UP
EGFR: 32 ML/MIN/1.73M2 — LOW
EGFR: 35 ML/MIN/1.73M2 — LOW
GLUCOSE SERPL-MCNC: 127 MG/DL — HIGH (ref 70–99)
GLUCOSE SERPL-MCNC: 174 MG/DL — HIGH (ref 70–99)
HCT VFR BLD CALC: 40.4 % — SIGNIFICANT CHANGE UP (ref 34.5–45)
HCT VFR BLD CALC: 41.6 % — SIGNIFICANT CHANGE UP (ref 34.5–45)
HGB BLD-MCNC: 13.3 G/DL — SIGNIFICANT CHANGE UP (ref 11.5–15.5)
HGB BLD-MCNC: 13.8 G/DL — SIGNIFICANT CHANGE UP (ref 11.5–15.5)
LACTATE SERPL-SCNC: 1.9 MMOL/L — SIGNIFICANT CHANGE UP (ref 0.7–2)
LACTATE SERPL-SCNC: 3.6 MMOL/L — HIGH (ref 0.7–2)
MCHC RBC-ENTMCNC: 29.2 PG — SIGNIFICANT CHANGE UP (ref 27–34)
MCHC RBC-ENTMCNC: 29.3 PG — SIGNIFICANT CHANGE UP (ref 27–34)
MCHC RBC-ENTMCNC: 32.9 GM/DL — SIGNIFICANT CHANGE UP (ref 32–36)
MCHC RBC-ENTMCNC: 33.2 GM/DL — SIGNIFICANT CHANGE UP (ref 32–36)
MCV RBC AUTO: 87.9 FL — SIGNIFICANT CHANGE UP (ref 80–100)
MCV RBC AUTO: 89 FL — SIGNIFICANT CHANGE UP (ref 80–100)
NRBC # BLD: 0 /100 WBCS — SIGNIFICANT CHANGE UP (ref 0–0)
NRBC # BLD: 0 /100 WBCS — SIGNIFICANT CHANGE UP (ref 0–0)
PLATELET # BLD AUTO: 269 K/UL — SIGNIFICANT CHANGE UP (ref 150–400)
PLATELET # BLD AUTO: 308 K/UL — SIGNIFICANT CHANGE UP (ref 150–400)
POTASSIUM SERPL-MCNC: 3.6 MMOL/L — SIGNIFICANT CHANGE UP (ref 3.5–5.3)
POTASSIUM SERPL-MCNC: 3.7 MMOL/L — SIGNIFICANT CHANGE UP (ref 3.5–5.3)
POTASSIUM SERPL-SCNC: 3.6 MMOL/L — SIGNIFICANT CHANGE UP (ref 3.5–5.3)
POTASSIUM SERPL-SCNC: 3.7 MMOL/L — SIGNIFICANT CHANGE UP (ref 3.5–5.3)
RBC # BLD: 4.54 M/UL — SIGNIFICANT CHANGE UP (ref 3.8–5.2)
RBC # BLD: 4.73 M/UL — SIGNIFICANT CHANGE UP (ref 3.8–5.2)
RBC # FLD: 13.8 % — SIGNIFICANT CHANGE UP (ref 10.3–14.5)
RBC # FLD: 13.8 % — SIGNIFICANT CHANGE UP (ref 10.3–14.5)
SODIUM SERPL-SCNC: 140 MMOL/L — SIGNIFICANT CHANGE UP (ref 135–145)
SODIUM SERPL-SCNC: 140 MMOL/L — SIGNIFICANT CHANGE UP (ref 135–145)
SPECIMEN SOURCE: SIGNIFICANT CHANGE UP
WBC # BLD: 15.42 K/UL — HIGH (ref 3.8–10.5)
WBC # BLD: 16.26 K/UL — HIGH (ref 3.8–10.5)
WBC # FLD AUTO: 15.42 K/UL — HIGH (ref 3.8–10.5)
WBC # FLD AUTO: 16.26 K/UL — HIGH (ref 3.8–10.5)

## 2023-02-12 PROCEDURE — 74176 CT ABD & PELVIS W/O CONTRAST: CPT | Mod: 26

## 2023-02-12 PROCEDURE — 71250 CT THORAX DX C-: CPT | Mod: 26

## 2023-02-12 PROCEDURE — 99233 SBSQ HOSP IP/OBS HIGH 50: CPT

## 2023-02-12 RX ORDER — PIPERACILLIN AND TAZOBACTAM 4; .5 G/20ML; G/20ML
3.38 INJECTION, POWDER, LYOPHILIZED, FOR SOLUTION INTRAVENOUS ONCE
Refills: 0 | Status: COMPLETED | OUTPATIENT
Start: 2023-02-12 | End: 2023-02-12

## 2023-02-12 RX ORDER — IOHEXOL 300 MG/ML
30 INJECTION, SOLUTION INTRAVENOUS ONCE
Refills: 0 | Status: DISCONTINUED | OUTPATIENT
Start: 2023-02-12 | End: 2023-02-12

## 2023-02-12 RX ORDER — SODIUM CHLORIDE 9 MG/ML
1000 INJECTION INTRAMUSCULAR; INTRAVENOUS; SUBCUTANEOUS ONCE
Refills: 0 | Status: COMPLETED | OUTPATIENT
Start: 2023-02-12 | End: 2023-02-12

## 2023-02-12 RX ORDER — PIPERACILLIN AND TAZOBACTAM 4; .5 G/20ML; G/20ML
3.38 INJECTION, POWDER, LYOPHILIZED, FOR SOLUTION INTRAVENOUS ONCE
Refills: 0 | Status: COMPLETED | OUTPATIENT
Start: 2023-02-13 | End: 2023-02-13

## 2023-02-12 RX ORDER — DIATRIZOATE MEGLUMINE 180 MG/ML
30 INJECTION, SOLUTION INTRAVESICAL ONCE
Refills: 0 | Status: DISCONTINUED | OUTPATIENT
Start: 2023-02-12 | End: 2023-02-16

## 2023-02-12 RX ORDER — PIPERACILLIN AND TAZOBACTAM 4; .5 G/20ML; G/20ML
3.38 INJECTION, POWDER, LYOPHILIZED, FOR SOLUTION INTRAVENOUS EVERY 8 HOURS
Refills: 0 | Status: DISCONTINUED | OUTPATIENT
Start: 2023-02-13 | End: 2023-02-14

## 2023-02-12 RX ADMIN — Medication 25 MILLIGRAM(S): at 06:25

## 2023-02-12 RX ADMIN — PIPERACILLIN AND TAZOBACTAM 200 GRAM(S): 4; .5 INJECTION, POWDER, LYOPHILIZED, FOR SOLUTION INTRAVENOUS at 15:52

## 2023-02-12 RX ADMIN — HEPARIN SODIUM 5000 UNIT(S): 5000 INJECTION INTRAVENOUS; SUBCUTANEOUS at 21:34

## 2023-02-12 RX ADMIN — Medication 81 MILLIGRAM(S): at 12:51

## 2023-02-12 RX ADMIN — ATORVASTATIN CALCIUM 80 MILLIGRAM(S): 80 TABLET, FILM COATED ORAL at 21:34

## 2023-02-12 RX ADMIN — Medication 650 MILLIGRAM(S): at 23:24

## 2023-02-12 RX ADMIN — HEPARIN SODIUM 5000 UNIT(S): 5000 INJECTION INTRAVENOUS; SUBCUTANEOUS at 06:24

## 2023-02-12 RX ADMIN — SODIUM CHLORIDE 1000 MILLILITER(S): 9 INJECTION INTRAMUSCULAR; INTRAVENOUS; SUBCUTANEOUS at 15:35

## 2023-02-12 RX ADMIN — Medication 3 MILLIGRAM(S): at 23:16

## 2023-02-12 RX ADMIN — PIPERACILLIN AND TAZOBACTAM 25 GRAM(S): 4; .5 INJECTION, POWDER, LYOPHILIZED, FOR SOLUTION INTRAVENOUS at 17:44

## 2023-02-12 NOTE — PROGRESS NOTE ADULT - SUBJECTIVE AND OBJECTIVE BOX
Patient is a 88y old  Female who presents with a chief complaint of Weakness    Reports she wants to go home  Denies chest pain, SOB   Tolerating diet    Patient seen and examined at bedside.    ALLERGIES:  No Known Allergies    MEDICATIONS  (STANDING):  aspirin enteric coated 81 milliGRAM(s) Oral daily  atorvastatin 80 milliGRAM(s) Oral at bedtime  heparin   Injectable 5000 Unit(s) SubCutaneous every 8 hours  influenza  Vaccine (HIGH DOSE) 0.7 milliLiter(s) IntraMuscular once  metoprolol succinate ER 25 milliGRAM(s) Oral daily  sodium chloride 0.9%. 1000 milliLiter(s) (70 mL/Hr) IV Continuous <Continuous>    MEDICATIONS  (PRN):  acetaminophen     Tablet .. 650 milliGRAM(s) Oral every 6 hours PRN Temp greater or equal to 38C (100.4F), Mild Pain (1 - 3)  aluminum hydroxide/magnesium hydroxide/simethicone Suspension 30 milliLiter(s) Oral every 4 hours PRN Dyspepsia  melatonin 3 milliGRAM(s) Oral at bedtime PRN Insomnia  ondansetron Injectable 4 milliGRAM(s) IV Push every 8 hours PRN Nausea and/or Vomiting    Vital Signs Last 24 Hrs  T(F): 97.9 (2023 05:03), Max: 97.9 (2023 05:03)  HR: 65 (2023 05:03) (65 - 70)  BP: 118/71 (2023 05:03) (118/71 - 135/84)  RR: 16 (2023 05:03) (16 - 18)  SpO2: 95% (2023 05:03) (95% - 97%)  I&O's Summary    2023 07:01  -  2023 07:00  --------------------------------------------------------  IN: 0 mL / OUT: 100 mL / NET: -100 mL      BMI (kg/m2): 26.6 (23 @ 10:20)  PHYSICAL EXAM:  General: NAD, A/O x 2, speaking in full sentences  ENT: MMM, no scleral icterus  Neck: Supple, No JVD, no thyroidomegaly  Lungs: Clear to auscultation bilaterally, no wheezes, no rales, no rhonchi, good inspiratory effort  Cardio: RRR, S1/S2, No murmurs  Abdomen: Soft, Nontender, Nondistended; Bowel sounds present  Extremities: No calf tenderness, No pitting edema, no skin changes    LABS:                        13.3   15.42 )-----------( 269      ( 2023 06:50 )             40.4       02-12    140  |  103  |  39  ----------------------------<  127  3.6   |  25  |  1.57    Ca    8.3      2023 06:50  Phos  3.9     -  Mg     1.9     -    TPro  6.2  /  Alb  2.0  /  TBili  1.0  /  DBili  0.4  /  AST  33  /  ALT  14  /  AlkPhos  132  02-11     Urinalysis Basic - ( 10 Feb 2023 15:15 )    Color: Yellow / Appearance: Clear / S.015 / pH: x  Gluc: x / Ketone: Negative  / Bili: Negative / Urobili: 1   Blood: x / Protein: 30 mg/dL / Nitrite: Negative   Leuk Esterase: Trace / RBC: 11-25 /HPF / WBC Negative /HPF   Sq Epi: x / Non Sq Epi: Neg.-Few / Bacteria: Few /HPF    Culture - Urine (collected 10 Feb 2023 15:15)  Source: Clean Catch Clean Catch (Midstream)  Preliminary Report (2023 10:03):    Culture in progress    COVID-19 PCR: NotDetec (22 @ 16:25)  COVID-19 PCR: NotDetec (22 @ 17:40)

## 2023-02-12 NOTE — DISCHARGE NOTE PROVIDER - NSDCMRMEDTOKEN_GEN_ALL_CORE_FT
aspirin 81 mg oral delayed release tablet: 1 tab(s) orally once a day  atorvastatin 80 mg oral tablet: 1 tab(s) orally once a day (at bedtime)  furosemide 40 mg oral tablet: 1 tab(s) orally once a day  losartan 100 mg oral tablet: 1 tab(s) orally once a day  metoprolol succinate 25 mg oral tablet, extended release: 1 tab(s) orally once a day

## 2023-02-12 NOTE — CHART NOTE - NSCHARTNOTEFT_GEN_A_CORE
Noted LA of 3.6  FUrther, patient c/o mid epigastric pain  WIll order NS bolus 1L now, STAT BCX x 2  Will start zosyn 3.375gm IV q 8hrs, first dose STAT  Will repeat LA until normalizes    Will check CT chest, CTAP with po contrast only    Spoke with lashon Oneill at bedside, updated to above, in agreement with care plan

## 2023-02-12 NOTE — DISCHARGE NOTE PROVIDER - NSDCFUSCHEDAPPT_GEN_ALL_CORE_FT
Isiah Hendrix  [facility]  NEUROLOGY [address]  Scheduled Appointment: 02/24/2023

## 2023-02-12 NOTE — DISCHARGE NOTE PROVIDER - NSDCCPCAREPLAN_GEN_ALL_CORE_FT
PRINCIPAL DISCHARGE DIAGNOSIS  Diagnosis: Generalized weakness  Assessment and Plan of Treatment: For hosoice INN

## 2023-02-12 NOTE — DISCHARGE NOTE PROVIDER - HOSPITAL COURSE
Hospital Course  HPI:  88F with HTN, mild dementia, HLD, comes to hospital for weakness. She has "not been herself" the last few days - went to PMD office today - noted to be "listless" - and was sent to ED. Patient was given IVF in the ED. Has much improved since. Per patient, her only complaint is a "cough" which has been going on for some time. No chest pain. No fevers. No sick contacts. No burning with urination. No N/V/D. Took patient off O2 - satting 100% currently on RA. Patient unreliable historian due to mild dementia.  (10 Feb 2023 16:19)      Patient admitted to medicine.  She was noted to have ANGELIA and given IVF  She was noted to have a leukocytosis, UCX are still pending  WBC cell count has worsened, although there has been no fever noted    PT evaluated patient and she did very well; their recommendation is home with home care    Patient to be discharged home with home care once creatinine normalizes and WBC count improves  MOLST was filled out with HCP son, patient is DNR/DNI

## 2023-02-13 DIAGNOSIS — C25.9 MALIGNANT NEOPLASM OF PANCREAS, UNSPECIFIED: ICD-10-CM

## 2023-02-13 LAB
ANION GAP SERPL CALC-SCNC: 15 MMOL/L — SIGNIFICANT CHANGE UP (ref 5–17)
BUN SERPL-MCNC: 39 MG/DL — HIGH (ref 7–23)
CALCIUM SERPL-MCNC: 8.2 MG/DL — LOW (ref 8.4–10.5)
CHLORIDE SERPL-SCNC: 102 MMOL/L — SIGNIFICANT CHANGE UP (ref 96–108)
CO2 SERPL-SCNC: 20 MMOL/L — LOW (ref 22–31)
CREAT SERPL-MCNC: 1.79 MG/DL — HIGH (ref 0.5–1.3)
EGFR: 27 ML/MIN/1.73M2 — LOW
GLUCOSE SERPL-MCNC: 159 MG/DL — HIGH (ref 70–99)
HCT VFR BLD CALC: 42.3 % — SIGNIFICANT CHANGE UP (ref 34.5–45)
HGB BLD-MCNC: 13.4 G/DL — SIGNIFICANT CHANGE UP (ref 11.5–15.5)
MAGNESIUM SERPL-MCNC: 2.2 MG/DL — SIGNIFICANT CHANGE UP (ref 1.6–2.6)
MCHC RBC-ENTMCNC: 28.9 PG — SIGNIFICANT CHANGE UP (ref 27–34)
MCHC RBC-ENTMCNC: 31.7 GM/DL — LOW (ref 32–36)
MCV RBC AUTO: 91.4 FL — SIGNIFICANT CHANGE UP (ref 80–100)
NRBC # BLD: 0 /100 WBCS — SIGNIFICANT CHANGE UP (ref 0–0)
PLATELET # BLD AUTO: 239 K/UL — SIGNIFICANT CHANGE UP (ref 150–400)
POTASSIUM SERPL-MCNC: 3.8 MMOL/L — SIGNIFICANT CHANGE UP (ref 3.5–5.3)
POTASSIUM SERPL-SCNC: 3.8 MMOL/L — SIGNIFICANT CHANGE UP (ref 3.5–5.3)
PROCALCITONIN SERPL-MCNC: 1.41 NG/ML — HIGH
RBC # BLD: 4.63 M/UL — SIGNIFICANT CHANGE UP (ref 3.8–5.2)
RBC # FLD: 14.3 % — SIGNIFICANT CHANGE UP (ref 10.3–14.5)
SODIUM SERPL-SCNC: 137 MMOL/L — SIGNIFICANT CHANGE UP (ref 135–145)
WBC # BLD: 15.16 K/UL — HIGH (ref 3.8–10.5)
WBC # FLD AUTO: 15.16 K/UL — HIGH (ref 3.8–10.5)

## 2023-02-13 PROCEDURE — 99223 1ST HOSP IP/OBS HIGH 75: CPT

## 2023-02-13 PROCEDURE — 99233 SBSQ HOSP IP/OBS HIGH 50: CPT

## 2023-02-13 RX ORDER — ACETAMINOPHEN 500 MG
650 TABLET ORAL ONCE
Refills: 0 | Status: COMPLETED | OUTPATIENT
Start: 2023-02-13 | End: 2023-02-13

## 2023-02-13 RX ORDER — MORPHINE SULFATE 50 MG/1
2 CAPSULE, EXTENDED RELEASE ORAL EVERY 4 HOURS
Refills: 0 | Status: DISCONTINUED | OUTPATIENT
Start: 2023-02-13 | End: 2023-02-16

## 2023-02-13 RX ORDER — MORPHINE SULFATE 50 MG/1
2 CAPSULE, EXTENDED RELEASE ORAL ONCE
Refills: 0 | Status: DISCONTINUED | OUTPATIENT
Start: 2023-02-13 | End: 2023-02-13

## 2023-02-13 RX ADMIN — MORPHINE SULFATE 2 MILLIGRAM(S): 50 CAPSULE, EXTENDED RELEASE ORAL at 22:27

## 2023-02-13 RX ADMIN — Medication 650 MILLIGRAM(S): at 11:35

## 2023-02-13 RX ADMIN — Medication 650 MILLIGRAM(S): at 10:35

## 2023-02-13 RX ADMIN — Medication 81 MILLIGRAM(S): at 11:22

## 2023-02-13 RX ADMIN — MORPHINE SULFATE 2 MILLIGRAM(S): 50 CAPSULE, EXTENDED RELEASE ORAL at 23:00

## 2023-02-13 RX ADMIN — PIPERACILLIN AND TAZOBACTAM 25 GRAM(S): 4; .5 INJECTION, POWDER, LYOPHILIZED, FOR SOLUTION INTRAVENOUS at 06:01

## 2023-02-13 RX ADMIN — PIPERACILLIN AND TAZOBACTAM 25 GRAM(S): 4; .5 INJECTION, POWDER, LYOPHILIZED, FOR SOLUTION INTRAVENOUS at 22:11

## 2023-02-13 RX ADMIN — HEPARIN SODIUM 5000 UNIT(S): 5000 INJECTION INTRAVENOUS; SUBCUTANEOUS at 05:08

## 2023-02-13 RX ADMIN — ATORVASTATIN CALCIUM 80 MILLIGRAM(S): 80 TABLET, FILM COATED ORAL at 22:10

## 2023-02-13 RX ADMIN — PIPERACILLIN AND TAZOBACTAM 25 GRAM(S): 4; .5 INJECTION, POWDER, LYOPHILIZED, FOR SOLUTION INTRAVENOUS at 00:19

## 2023-02-13 RX ADMIN — HEPARIN SODIUM 5000 UNIT(S): 5000 INJECTION INTRAVENOUS; SUBCUTANEOUS at 13:32

## 2023-02-13 RX ADMIN — Medication 650 MILLIGRAM(S): at 14:44

## 2023-02-13 RX ADMIN — Medication 25 MILLIGRAM(S): at 05:08

## 2023-02-13 RX ADMIN — PIPERACILLIN AND TAZOBACTAM 25 GRAM(S): 4; .5 INJECTION, POWDER, LYOPHILIZED, FOR SOLUTION INTRAVENOUS at 13:32

## 2023-02-13 RX ADMIN — ONDANSETRON 4 MILLIGRAM(S): 8 TABLET, FILM COATED ORAL at 22:27

## 2023-02-13 RX ADMIN — Medication 650 MILLIGRAM(S): at 15:44

## 2023-02-13 RX ADMIN — HEPARIN SODIUM 5000 UNIT(S): 5000 INJECTION INTRAVENOUS; SUBCUTANEOUS at 22:10

## 2023-02-13 NOTE — CONSULT NOTE ADULT - PROBLEM SELECTOR RECOMMENDATION 9
Head of the pancreas mass with innumerable low-attenuation lesions on CT A/P consistent with pancreatic neoplasm already metastasized to the liver.  No tissue diagnosis available.  Will check CA 19–9 and monitor LFTs.  Given the patient's advanced age and comorbidities would favor comfort care.  Case will be discussed with patient's family.  Overall prognosis guarded.

## 2023-02-13 NOTE — CONSULT NOTE ADULT - SUBJECTIVE AND OBJECTIVE BOX
NEIL MOORE,  88y Female  MRN: [id number]  ATTENDING: Dr. Miguel Angel Cummings      HPI:  88F with HTN, mild dementia, HLD, comes to hospital for weakness. She has "not been herself" the last few days - went to PMD office today - noted to be "listless" - and was sent to ED. Patient was given IVF in the ED. Has much improved since. Per patient, her only complaint is a "cough" which has been going on for some time. No chest pain. No fevers. No sick contacts. No burning with urination. No N/V/D. Took patient off O2 - satting 100% currently on RA. Patient unreliable historian due to mild dementia.  (10 Feb 2023 16:19)    PAST MEDICAL & SURGICAL HISTORY:  HTN (hypertension)  S/P partial thyroidectomy    MEDICATION:  aspirin enteric coated 81 milliGRAM(s) Oral daily  atorvastatin 80 milliGRAM(s) Oral at bedtime  diatrizoate meglumine/diatrizoate sodium. 30 milliLiter(s) Oral once  heparin   Injectable 5000 Unit(s) SubCutaneous every 8 hours  influenza  Vaccine (HIGH DOSE) 0.7 milliLiter(s) IntraMuscular once  metoprolol succinate ER 25 milliGRAM(s) Oral daily  piperacillin/tazobactam IVPB.. 3.375 Gram(s) IV Intermittent every 8 hours  sodium chloride 0.9%. 1000 milliLiter(s) IV Continuous <Continuous>    ALLERGIES:  No Known Allergies    FAMILY HISTORY:  Reviewed, non-contributory: [ ]     SOCIAL HISTORY:  Tobacco: YES [ ]  ; NO [ ]; Former smoker [ x]  Alcohol:   YES [ ]  ; NO [ x]; Social alcohol user [ ]    REVIEW SYSTEMS:  Constitutional: no fever, chills, night sweats, no weight loss  HEENT: denies visual changes; no oral ulcers, dysphagia, no epistaxis;   Respiratory: no dyspnea , wheezing, cough, hemoptysis  Cardiovascular: denies acute chest pain, palpitations  GI: no loss of appetite, dark stools, or abdominal tenderness / pain; no change in bowel habits.  Musculoskeletal: no new back pain, bone/ joint pain ,no extremity swelling  Integumentary: denies pruritus; no skin rash, bruises, no  suspicious skin lesions  Neurologic: denies peripheral numbness, no dizziness, no gait problems.  Heme: no reported easy bruisability; no lymph node enlargement    VITALS:  T(C): 36.3, Max: 36.9 (02-12-23 @ 19:27)  T(F): 97.3, Max: 98.4 (02-12-23 @ 19:27)  HR: 78 (65 - 78)  BP: 95/55 (95/55 - 113/76)  SpO2: 100% (93% - 100%)    PHYSICAL EXAM:  Constitutional: alert, well developed  HEENT: normocephalic, anicteric sclerae, no mucositis or thrush  Respiratory: bilateral clear to auscultation anteriorly  Cardiovascular : S1, S2 regular, rhythmic, no murmurs, gallops or rubs  Abdomen: soft, distended, + normoactive BS, no palpable HS- megaly  Extremities: no tenderness;  -c/c/e, pulses equal bilaterally  Integumentary: no rashes, scars, or lesions suggestive of malignancy  Neurologic: no gross focal deficits    LABS:  (02-13) WBC: 15.16 K/uL,Hemoglobin: 13.4 g/dL, Hematocrit: 42.3 %,  Platelet: 239 K/uL  (02-13) Na: 137 mmol/L ; K: 3.8 mmol/L ; BUN: 39 mg/dL ; Cr: 1.79 mg/dL.    RADIOLOGY:  ACC: [id number] EXAM:  CT ABDOMEN AND PELVIS OC   ORDERED BY: LUKASZ GOSS     ACC: [id number] EXAM:  CT CHEST   ORDERED BY: LUKASZ GOSS   PROCEDURE DATE:  02/12/2023    INTERPRETATION:  CLINICAL INFORMATION: Sepsis. Abdominal pain.  COMPARISON: 4/30/2008    CONTRAST/COMPLICATIONS:  IV Contrast: NONE  Oral Contrast: Gastroview  Complications: None reported at time of study completion    PROCEDURE:  CT of the Chest, Abdomen and Pelvis was performed.  Sagittal and coronal reformatswere performed.    FINDINGS:  CHEST:  LUNGS AND LARGE AIRWAYS: Patent central airways. Basilar airspace   opacity, left greater than right consistent with atelectasis versus   pneumonia.  PLEURA: No pleural effusion.  VESSELS: Within normal limits.  HEART: Cardiomegaly. No pericardial effusion.  MEDIASTINUM AND GEORGIANA: Moderate hiatal hernia. Mediastinal adenopathy. For   reference:  *  Subcarinal lymph node measures 1.5 x 1.2 cm (2:31)  *  Pretracheal lymph node measures 1.4 x 1.2 cm (2:20)  CHESTWALL AND LOWER NECK: Within normal limits.    ABDOMEN AND PELVIS:  LIVER: Innumerable low-attenuation lesions in the liver largest measuring   approximately 3 cm, concerning for metastatic disease.  BILE DUCTS: Normal caliber.  GALLBLADDER: Layering biliary sludge  SPLEEN: Within normal limits.  PANCREAS: Mass in the head of the pancreas measuring 5.2 x 3.6 cm with   distal pancreatic ductal dilatation and atrophy, concerning for   pancreatic adenocarcinoma. Running of the peripancreatic fat.   Superimposed acute pancreatitis is a possibility.  ADRENALS: Low-attenuation thickening of the left adrenal gland likely on   the basis of underlying adrenal adenoma.  KIDNEYS/URETERS: Nonobstructing lower pole renal stone measuring 10 mm.   Punctate nonobstructing left renal stones. Left upper pole renal lesion   measures 1.8 cm, indeterminate.    BLADDER: Within normal limits.  REPRODUCTIVE ORGANS: Uterus and bilateral adnexa within normal limits.    BOWEL: No bowel obstruction. Appendix is normal.  PERITONEUM: Fluid collection along the greater curvature of the stomach   extending along the omentum measuring 6.1 x 5.0 x 8.1 cm. Nodularity   along the omentum and in the left upper abdomen. Peritoneal metastasis is   a possibility. Small amount of free fluid in the pelvis.  VESSELS: Within normal limits.  RETROPERITONEUM/LYMPH NODES: Periportal adenopathy.  ABDOMINAL WALL: Subcutaneous edema.  BONES: Osteopenia. Compression deformity of the L1 vertebral body.   Degenerative change.    IMPRESSION:  *  Findings concerning for pancreatic adenocarcinoma with metastases to   the liver.  *  Peripancreatic fat stranding and fluid collection along the greater   curvature of the stomach extending into the omentum, raising a question   of superimposed acute pancreatitis.  *  Basilar consolidation consistent with atelectasis/pneumonia.  *  Mediastinal adenopathy.                 NEIL MOORE,  88y Female  MRN: [id number]  ATTENDING: Dr. Miguel Angel Cummings    HPI:  88F, PMHx HTN, mild dementia, HLD, admitted to [facility] with failure to thrive.  Patient responded to IV fluids and was treated for cough.  She was overall stable clinically.  A CT A/P showed findings concerning for pancreatic adenocarcinoma with metastasis to the liver, peripancreatic fat stranding and fluid collection along the greater curvature of the stomach extending into the omentum, raising questions for acute pancreatitis, mediastinal adenopathy and basilar consolidation consistent with atelectasis and pneumonia.  No tissue diagnosis available for review.  Oncology consulted in light of above.    PAST MEDICAL & SURGICAL HISTORY:  HTN (hypertension)  S/P partial thyroidectomy    MEDICATION:  aspirin enteric coated 81 milliGRAM(s) Oral daily  atorvastatin 80 milliGRAM(s) Oral at bedtime  diatrizoate meglumine/diatrizoate sodium. 30 milliLiter(s) Oral once  heparin   Injectable 5000 Unit(s) SubCutaneous every 8 hours  influenza  Vaccine (HIGH DOSE) 0.7 milliLiter(s) IntraMuscular once  metoprolol succinate ER 25 milliGRAM(s) Oral daily  piperacillin/tazobactam IVPB.. 3.375 Gram(s) IV Intermittent every 8 hours  sodium chloride 0.9%. 1000 milliLiter(s) IV Continuous <Continuous>    ALLERGIES:  No Known Allergies    FAMILY HISTORY:  Reviewed, non-contributory: [ ]     SOCIAL HISTORY:  Tobacco: YES [ ]  ; NO [ ]; Former smoker [ x]  Alcohol:   YES [ ]  ; NO [ x]; Social alcohol user [ ]  ; has children    REVIEW SYSTEMS:  Difficult to obtain due to patient's dementia    VITALS:  T(C): 36.3, Max: 36.9 (02-12-23 @ 19:27)  T(F): 97.3, Max: 98.4 (02-12-23 @ 19:27)  HR: 78 (65 - 78)  BP: 95/55 (95/55 - 113/76)  SpO2: 100% (93% - 100%)    PHYSICAL EXAM:  Constitutional: alert, well developed  HEENT: normocephalic, anicteric sclerae, no mucositis or thrush  Respiratory: bilateral clear to auscultation anteriorly  Cardiovascular : S1, S2 regular, rhythmic, no murmurs, gallops or rubs  Abdomen: soft, distended, + normoactive BS, no palpable HS- megaly  Extremities: no tenderness;  -c/c/e, pulses equal bilaterally  Integumentary: no rashes, scars, or lesions suggestive of malignancy  Neurologic: no gross focal deficits    LABS:  (02-13) WBC: 15.16 K/uL,Hemoglobin: 13.4 g/dL, Hematocrit: 42.3 %,  Platelet: 239 K/uL  (02-13) Na: 137 mmol/L ; K: 3.8 mmol/L ; BUN: 39 mg/dL ; Cr: 1.79 mg/dL.    RADIOLOGY:  ACC: [id number] EXAM:  CT ABDOMEN AND PELVIS OC   ORDERED BY: LUKASZ GOSS     ACC: [id number] EXAM:  CT CHEST   ORDERED BY: LUKASZ GOSS   PROCEDURE DATE:  02/12/2023    INTERPRETATION:  CLINICAL INFORMATION: Sepsis. Abdominal pain.  COMPARISON: 4/30/2008    CONTRAST/COMPLICATIONS:  IV Contrast: NONE  Oral Contrast: Gastroview  Complications: None reported at time of study completion    PROCEDURE:  CT of the Chest, Abdomen and Pelvis was performed.  Sagittal and coronal reformatswere performed.    FINDINGS:  CHEST:  LUNGS AND LARGE AIRWAYS: Patent central airways. Basilar airspace   opacity, left greater than right consistent with atelectasis versus   pneumonia.  PLEURA: No pleural effusion.  VESSELS: Within normal limits.  HEART: Cardiomegaly. No pericardial effusion.  MEDIASTINUM AND GEORGIANA: Moderate hiatal hernia. Mediastinal adenopathy. For   reference:  *  Subcarinal lymph node measures 1.5 x 1.2 cm (2:31)  *  Pretracheal lymph node measures 1.4 x 1.2 cm (2:20)  CHESTWALL AND LOWER NECK: Within normal limits.    ABDOMEN AND PELVIS:  LIVER: Innumerable low-attenuation lesions in the liver largest measuring   approximately 3 cm, concerning for metastatic disease.  BILE DUCTS: Normal caliber.  GALLBLADDER: Layering biliary sludge  SPLEEN: Within normal limits.  PANCREAS: Mass in the head of the pancreas measuring 5.2 x 3.6 cm with   distal pancreatic ductal dilatation and atrophy, concerning for   pancreatic adenocarcinoma. Running of the peripancreatic fat.   Superimposed acute pancreatitis is a possibility.  ADRENALS: Low-attenuation thickening of the left adrenal gland likely on   the basis of underlying adrenal adenoma.  KIDNEYS/URETERS: Nonobstructing lower pole renal stone measuring 10 mm.   Punctate nonobstructing left renal stones. Left upper pole renal lesion   measures 1.8 cm, indeterminate.    BLADDER: Within normal limits.  REPRODUCTIVE ORGANS: Uterus and bilateral adnexa within normal limits.    BOWEL: No bowel obstruction. Appendix is normal.  PERITONEUM: Fluid collection along the greater curvature of the stomach   extending along the omentum measuring 6.1 x 5.0 x 8.1 cm. Nodularity   along the omentum and in the left upper abdomen. Peritoneal metastasis is   a possibility. Small amount of free fluid in the pelvis.  VESSELS: Within normal limits.  RETROPERITONEUM/LYMPH NODES: Periportal adenopathy.  ABDOMINAL WALL: Subcutaneous edema.  BONES: Osteopenia. Compression deformity of the L1 vertebral body.   Degenerative change.    IMPRESSION:  *  Findings concerning for pancreatic adenocarcinoma with metastases to   the liver.  *  Peripancreatic fat stranding and fluid collection along the greater   curvature of the stomach extending into the omentum, raising a question   of superimposed acute pancreatitis.  *  Basilar consolidation consistent with atelectasis/pneumonia.  *  Mediastinal adenopathy.

## 2023-02-13 NOTE — PROGRESS NOTE ADULT - SUBJECTIVE AND OBJECTIVE BOX
Patient is a 88y old  Female who presents with a chief complaint of Weakness (2023 14:21)      Patient seen and examined at bedside. c/o intermittent abdmoinal     ALLERGIES:  No Known Allergies    MEDICATIONS  (STANDING):  aspirin enteric coated 81 milliGRAM(s) Oral daily  atorvastatin 80 milliGRAM(s) Oral at bedtime  diatrizoate meglumine/diatrizoate sodium. 30 milliLiter(s) Oral once  heparin   Injectable 5000 Unit(s) SubCutaneous every 8 hours  influenza  Vaccine (HIGH DOSE) 0.7 milliLiter(s) IntraMuscular once  metoprolol succinate ER 25 milliGRAM(s) Oral daily  piperacillin/tazobactam IVPB.. 3.375 Gram(s) IV Intermittent every 8 hours  sodium chloride 0.9%. 1000 milliLiter(s) (70 mL/Hr) IV Continuous <Continuous>    MEDICATIONS  (PRN):  acetaminophen     Tablet .. 650 milliGRAM(s) Oral every 6 hours PRN Temp greater or equal to 38C (100.4F), Mild Pain (1 - 3)  aluminum hydroxide/magnesium hydroxide/simethicone Suspension 30 milliLiter(s) Oral every 4 hours PRN Dyspepsia  melatonin 3 milliGRAM(s) Oral at bedtime PRN Insomnia  morphine  - Injectable 2 milliGRAM(s) IV Push every 4 hours PRN Severe Pain (7 - 10)  ondansetron Injectable 4 milliGRAM(s) IV Push every 8 hours PRN Nausea and/or Vomiting    Vital Signs Last 24 Hrs  T(F): 97.3 (2023 12:00), Max: 98.4 (2023 19:27)  HR: 75 (2023 14:24) (65 - 78)  BP: 94/70 (2023 14:24) (94/70 - 113/76)  RR: 16 (2023 12:00) (16 - 16)  SpO2: 98% (2023 14:24) (93% - 100%)  I&O's Summary    2023 07:01  -  2023 07:00  --------------------------------------------------------  IN: 490 mL / OUT: 0 mL / NET: 490 mL    2023 07:01  -  2023 15:04  --------------------------------------------------------  IN: 100 mL / OUT: 0 mL / NET: 100 mL      PHYSICAL EXAM:  General: NAD, A/O x 3  ENT: MMM, no oral thrush   Neck: Supple, No JVD  Lungs: Clear to auscultation bilaterally, non labored breathing  Cardio: RRR, S1/S2, No murmurs  Abdomen: Soft, Nontender, Nondistended; Bowel sounds present  Extremities: No calf tenderness, No pitting edema    LABS:                        13.4   15.16 )-----------( 239      ( 2023 08:06 )             42.3     02-    137  |  102  |  39  ----------------------------<  159  3.8   |  20  |  1.79    Ca    8.2      2023 08:06  Phos  3.9     -11  Mg     2.2         TPro  6.2  /  Alb  2.0  /  TBili  1.0  /  DBili  0.4  /  AST  33  /  ALT  14  /  AlkPhos  132  02-11        Lactate, Blood: 1.9 mmol/L ( @ 18:15)  Lactate, Blood: 3.6 mmol/L ( @ 13:30)                          Urinalysis Basic - ( 10 Feb 2023 15:15 )    Color: Yellow / Appearance: Clear / S.015 / pH: x  Gluc: x / Ketone: Negative  / Bili: Negative / Urobili: 1   Blood: x / Protein: 30 mg/dL / Nitrite: Negative   Leuk Esterase: Trace / RBC: 11-25 /HPF / WBC Negative /HPF   Sq Epi: x / Non Sq Epi: Neg.-Few / Bacteria: Few /HPF        Culture - Urine (collected 10 Feb 2023 15:15)  Source: Clean Catch Clean Catch (Midstream)  Final Report (2023 13:28):    >=3 organisms. Probable collection contamination.          RADIOLOGY & ADDITIONAL TESTS:    Care Discussed with Consultants/Other Providers:    Patient is a 88y old  Female who presents with a chief complaint of Weakness (2023 14:21)      Patient seen and examined at bedside. c/o intermittent abdominal pain. Denies further complaints.     ALLERGIES:  No Known Allergies    MEDICATIONS  (STANDING):  aspirin enteric coated 81 milliGRAM(s) Oral daily  atorvastatin 80 milliGRAM(s) Oral at bedtime  diatrizoate meglumine/diatrizoate sodium. 30 milliLiter(s) Oral once  heparin   Injectable 5000 Unit(s) SubCutaneous every 8 hours  influenza  Vaccine (HIGH DOSE) 0.7 milliLiter(s) IntraMuscular once  metoprolol succinate ER 25 milliGRAM(s) Oral daily  piperacillin/tazobactam IVPB.. 3.375 Gram(s) IV Intermittent every 8 hours  sodium chloride 0.9%. 1000 milliLiter(s) (70 mL/Hr) IV Continuous <Continuous>    MEDICATIONS  (PRN):  acetaminophen     Tablet .. 650 milliGRAM(s) Oral every 6 hours PRN Temp greater or equal to 38C (100.4F), Mild Pain (1 - 3)  aluminum hydroxide/magnesium hydroxide/simethicone Suspension 30 milliLiter(s) Oral every 4 hours PRN Dyspepsia  melatonin 3 milliGRAM(s) Oral at bedtime PRN Insomnia  morphine  - Injectable 2 milliGRAM(s) IV Push every 4 hours PRN Severe Pain (7 - 10)  ondansetron Injectable 4 milliGRAM(s) IV Push every 8 hours PRN Nausea and/or Vomiting    Vital Signs Last 24 Hrs  T(F): 97.3 (2023 12:00), Max: 98.4 (2023 19:27)  HR: 75 (2023 14:24) (65 - 78)  BP: 94/70 (2023 14:24) (94/70 - 113/76)  RR: 16 (2023 12:00) (16 - 16)  SpO2: 98% (2023 14:24) (93% - 100%)  I&O's Summary    2023 07:01  -  2023 07:00  --------------------------------------------------------  IN: 490 mL / OUT: 0 mL / NET: 490 mL    2023 07:01  -  2023 15:04  --------------------------------------------------------  IN: 100 mL / OUT: 0 mL / NET: 100 mL      PHYSICAL EXAM:  General: NAD, weak, A/O x 2  ENT: MMM, no oral thrush   Neck: Supple, No JVD  Lungs: Clear to auscultation bilaterally, non labored breathing  Cardio: RRR, S1/S2, No murmurs  Abdomen: Soft, Mild epigastric tendernes, Nondistended; Bowel sounds present  Extremities: No calf tenderness, No pitting edema    LABS:                        13.4   15.16 )-----------( 239      ( 2023 08:06 )             42.3     02-13    137  |  102  |  39  ----------------------------<  159  3.8   |  20  |  1.79    Ca    8.2      2023 08:06  Phos  3.9     02-11  Mg     2.2     -    TPro  6.2  /  Alb  2.0  /  TBili  1.0  /  DBili  0.4  /  AST  33  /  ALT  14  /  AlkPhos  132  02-11        Lactate, Blood: 1.9 mmol/L ( @ 18:15)  Lactate, Blood: 3.6 mmol/L ( @ 13:30)                          Urinalysis Basic - ( 10 Feb 2023 15:15 )    Color: Yellow / Appearance: Clear / S.015 / pH: x  Gluc: x / Ketone: Negative  / Bili: Negative / Urobili: 1   Blood: x / Protein: 30 mg/dL / Nitrite: Negative   Leuk Esterase: Trace / RBC: 11-25 /HPF / WBC Negative /HPF   Sq Epi: x / Non Sq Epi: Neg.-Few / Bacteria: Few /HPF        Culture - Urine (collected 10 Feb 2023 15:15)  Source: Clean Catch Clean Catch (Midstream)  Final Report (2023 13:28):    >=3 organisms. Probable collection contamination.          RADIOLOGY & ADDITIONAL TESTS:  < from: CT Abdomen and Pelvis w/ Oral Cont (23 @ 18:47) >    IMPRESSION:  *  Findings concerning for pancreatic adenocarcinoma with metastases to   the liver.  *  Peripancreatic fat stranding and fluid collection along the greater   curvature of the stomach extending into the omentum, raising a question   of superimposed acute pancreatitis.  *  Basilar consolidation consistent with atelectasis/pneumonia.  *  Mediastinal adenopathy.    --- End of Report ---            CHRIS CHOW MD; Attending Radiologist  This document has been electronically signed. 2023 7:50PM    < end of copied text >    Care Discussed with Consultants/Other Providers:

## 2023-02-14 LAB
ALBUMIN SERPL ELPH-MCNC: 1.7 G/DL — LOW (ref 3.3–5)
ALP SERPL-CCNC: 154 U/L — HIGH (ref 40–120)
ALT FLD-CCNC: 30 U/L — SIGNIFICANT CHANGE UP (ref 10–45)
ANION GAP SERPL CALC-SCNC: 16 MMOL/L — SIGNIFICANT CHANGE UP (ref 5–17)
AST SERPL-CCNC: 122 U/L — HIGH (ref 10–40)
BASOPHILS # BLD AUTO: 0.03 K/UL — SIGNIFICANT CHANGE UP (ref 0–0.2)
BASOPHILS NFR BLD AUTO: 0.2 % — SIGNIFICANT CHANGE UP (ref 0–2)
BILIRUB SERPL-MCNC: 1.1 MG/DL — SIGNIFICANT CHANGE UP (ref 0.2–1.2)
BUN SERPL-MCNC: 50 MG/DL — HIGH (ref 7–23)
CALCIUM SERPL-MCNC: 8 MG/DL — LOW (ref 8.4–10.5)
CHLORIDE SERPL-SCNC: 102 MMOL/L — SIGNIFICANT CHANGE UP (ref 96–108)
CO2 SERPL-SCNC: 21 MMOL/L — LOW (ref 22–31)
CREAT SERPL-MCNC: 2.5 MG/DL — HIGH (ref 0.5–1.3)
EGFR: 18 ML/MIN/1.73M2 — LOW
EOSINOPHIL # BLD AUTO: 0 K/UL — SIGNIFICANT CHANGE UP (ref 0–0.5)
EOSINOPHIL NFR BLD AUTO: 0 % — SIGNIFICANT CHANGE UP (ref 0–6)
GLUCOSE SERPL-MCNC: 124 MG/DL — HIGH (ref 70–99)
HCT VFR BLD CALC: 38.9 % — SIGNIFICANT CHANGE UP (ref 34.5–45)
HGB BLD-MCNC: 12.4 G/DL — SIGNIFICANT CHANGE UP (ref 11.5–15.5)
IMM GRANULOCYTES NFR BLD AUTO: 0.6 % — SIGNIFICANT CHANGE UP (ref 0–0.9)
LYMPHOCYTES # BLD AUTO: 0.36 K/UL — LOW (ref 1–3.3)
LYMPHOCYTES # BLD AUTO: 2.3 % — LOW (ref 13–44)
MCHC RBC-ENTMCNC: 28.8 PG — SIGNIFICANT CHANGE UP (ref 27–34)
MCHC RBC-ENTMCNC: 31.9 GM/DL — LOW (ref 32–36)
MCV RBC AUTO: 90.5 FL — SIGNIFICANT CHANGE UP (ref 80–100)
MONOCYTES # BLD AUTO: 1.03 K/UL — HIGH (ref 0–0.9)
MONOCYTES NFR BLD AUTO: 6.7 % — SIGNIFICANT CHANGE UP (ref 2–14)
NEUTROPHILS # BLD AUTO: 13.9 K/UL — HIGH (ref 1.8–7.4)
NEUTROPHILS NFR BLD AUTO: 90.2 % — HIGH (ref 43–77)
NRBC # BLD: 0 /100 WBCS — SIGNIFICANT CHANGE UP (ref 0–0)
PLATELET # BLD AUTO: 280 K/UL — SIGNIFICANT CHANGE UP (ref 150–400)
POTASSIUM SERPL-MCNC: 3.7 MMOL/L — SIGNIFICANT CHANGE UP (ref 3.5–5.3)
POTASSIUM SERPL-SCNC: 3.7 MMOL/L — SIGNIFICANT CHANGE UP (ref 3.5–5.3)
PROT SERPL-MCNC: 6 G/DL — SIGNIFICANT CHANGE UP (ref 6–8.3)
RBC # BLD: 4.3 M/UL — SIGNIFICANT CHANGE UP (ref 3.8–5.2)
RBC # FLD: 14.6 % — HIGH (ref 10.3–14.5)
SODIUM SERPL-SCNC: 139 MMOL/L — SIGNIFICANT CHANGE UP (ref 135–145)
WBC # BLD: 15.41 K/UL — HIGH (ref 3.8–10.5)
WBC # FLD AUTO: 15.41 K/UL — HIGH (ref 3.8–10.5)

## 2023-02-14 PROCEDURE — 93970 EXTREMITY STUDY: CPT | Mod: 26

## 2023-02-14 PROCEDURE — 93010 ELECTROCARDIOGRAM REPORT: CPT

## 2023-02-14 PROCEDURE — 99232 SBSQ HOSP IP/OBS MODERATE 35: CPT

## 2023-02-14 PROCEDURE — 99233 SBSQ HOSP IP/OBS HIGH 50: CPT

## 2023-02-14 RX ORDER — HEPARIN SODIUM 5000 [USP'U]/ML
INJECTION INTRAVENOUS; SUBCUTANEOUS
Qty: 25000 | Refills: 0 | Status: DISCONTINUED | OUTPATIENT
Start: 2023-02-14 | End: 2023-02-14

## 2023-02-14 RX ORDER — SODIUM CHLORIDE 9 MG/ML
1000 INJECTION, SOLUTION INTRAVENOUS ONCE
Refills: 0 | Status: DISCONTINUED | OUTPATIENT
Start: 2023-02-14 | End: 2023-02-14

## 2023-02-14 RX ORDER — PIPERACILLIN AND TAZOBACTAM 4; .5 G/20ML; G/20ML
3.38 INJECTION, POWDER, LYOPHILIZED, FOR SOLUTION INTRAVENOUS EVERY 12 HOURS
Refills: 0 | Status: DISCONTINUED | OUTPATIENT
Start: 2023-02-15 | End: 2023-02-16

## 2023-02-14 RX ORDER — SODIUM CHLORIDE 9 MG/ML
1000 INJECTION INTRAMUSCULAR; INTRAVENOUS; SUBCUTANEOUS
Refills: 0 | Status: DISCONTINUED | OUTPATIENT
Start: 2023-02-14 | End: 2023-02-16

## 2023-02-14 RX ORDER — HEPARIN SODIUM 5000 [USP'U]/ML
5500 INJECTION INTRAVENOUS; SUBCUTANEOUS ONCE
Refills: 0 | Status: DISCONTINUED | OUTPATIENT
Start: 2023-02-14 | End: 2023-02-14

## 2023-02-14 RX ORDER — SODIUM CHLORIDE 9 MG/ML
500 INJECTION, SOLUTION INTRAVENOUS ONCE
Refills: 0 | Status: COMPLETED | OUTPATIENT
Start: 2023-02-14 | End: 2023-02-14

## 2023-02-14 RX ORDER — HEPARIN SODIUM 5000 [USP'U]/ML
5500 INJECTION INTRAVENOUS; SUBCUTANEOUS EVERY 6 HOURS
Refills: 0 | Status: DISCONTINUED | OUTPATIENT
Start: 2023-02-14 | End: 2023-02-14

## 2023-02-14 RX ORDER — HEPARIN SODIUM 5000 [USP'U]/ML
2500 INJECTION INTRAVENOUS; SUBCUTANEOUS EVERY 6 HOURS
Refills: 0 | Status: DISCONTINUED | OUTPATIENT
Start: 2023-02-14 | End: 2023-02-14

## 2023-02-14 RX ADMIN — SODIUM CHLORIDE 75 MILLILITER(S): 9 INJECTION INTRAMUSCULAR; INTRAVENOUS; SUBCUTANEOUS at 13:58

## 2023-02-14 RX ADMIN — HEPARIN SODIUM 5000 UNIT(S): 5000 INJECTION INTRAVENOUS; SUBCUTANEOUS at 21:18

## 2023-02-14 RX ADMIN — Medication 81 MILLIGRAM(S): at 11:22

## 2023-02-14 RX ADMIN — SODIUM CHLORIDE 75 MILLILITER(S): 9 INJECTION INTRAMUSCULAR; INTRAVENOUS; SUBCUTANEOUS at 09:09

## 2023-02-14 RX ADMIN — PIPERACILLIN AND TAZOBACTAM 25 GRAM(S): 4; .5 INJECTION, POWDER, LYOPHILIZED, FOR SOLUTION INTRAVENOUS at 05:40

## 2023-02-14 RX ADMIN — PIPERACILLIN AND TAZOBACTAM 25 GRAM(S): 4; .5 INJECTION, POWDER, LYOPHILIZED, FOR SOLUTION INTRAVENOUS at 13:58

## 2023-02-14 RX ADMIN — ATORVASTATIN CALCIUM 80 MILLIGRAM(S): 80 TABLET, FILM COATED ORAL at 21:18

## 2023-02-14 RX ADMIN — HEPARIN SODIUM 5000 UNIT(S): 5000 INJECTION INTRAVENOUS; SUBCUTANEOUS at 13:58

## 2023-02-14 RX ADMIN — SODIUM CHLORIDE 500 MILLILITER(S): 9 INJECTION, SOLUTION INTRAVENOUS at 05:52

## 2023-02-14 RX ADMIN — HEPARIN SODIUM 5000 UNIT(S): 5000 INJECTION INTRAVENOUS; SUBCUTANEOUS at 05:40

## 2023-02-14 RX ADMIN — ONDANSETRON 4 MILLIGRAM(S): 8 TABLET, FILM COATED ORAL at 23:01

## 2023-02-14 RX ADMIN — SODIUM CHLORIDE 250 MILLILITER(S): 9 INJECTION, SOLUTION INTRAVENOUS at 21:16

## 2023-02-14 NOTE — PROGRESS NOTE ADULT - PROBLEM SELECTOR PLAN 1
Pancreatic cancer with metastases to the liver.  Reviewed today's lower extremity DVT consistent with bilateral multiple venous thrombosis identified in both calf regions.  Despite the lack of histologic diagnosis, imaging results are highly suggestive of a pancreatic malignancy, which is a highly hypercoagulable condition, explaining multiple DVTs.  Anticoagulation should be indefinitely.  Patient is an unlikely candidate for systemic chemotherapy.  As discussed with patient's offsprings, patient is a candidate for comfort care/hospice referral.

## 2023-02-14 NOTE — CHART NOTE - NSCHARTNOTEFT_GEN_A_CORE
US LE significant for Multiple bilateral below the knee DVT's. Discussed treatment options with son Nino. He prefers no anticoagulation at the moment.

## 2023-02-14 NOTE — PROGRESS NOTE ADULT - SUBJECTIVE AND OBJECTIVE BOX
NEIL MOORE, 88y Female  MRN: [id number]  ATTENDING: Miguel Angel Cummings    HPI:  88F, PMHx HTN, mild dementia, HLD, admitted to [facility] with failure to thrive.  Patient responded to IV fluids and was treated for cough.  She was overall stable clinically.  A CT A/P showed findings concerning for pancreatic adenocarcinoma with metastasis to the liver, peripancreatic fat stranding and fluid collection along the greater curvature of the stomach extending into the omentum, raising questions for acute pancreatitis, mediastinal adenopathy and basilar consolidation consistent with atelectasis and pneumonia.  No tissue diagnosis available for review.  Oncology consulted in light of above.    MEDICATIONS:  acetaminophen     Tablet .. 650 milliGRAM(s) Oral every 6 hours PRN  aluminum hydroxide/magnesium hydroxide/simethicone Suspension 30 milliLiter(s) Oral every 4 hours PRN  aspirin enteric coated 81 milliGRAM(s) Oral daily  atorvastatin 80 milliGRAM(s) Oral at bedtime  diatrizoate meglumine/diatrizoate sodium. 30 milliLiter(s) Oral once  heparin   Injectable 5000 Unit(s) SubCutaneous every 8 hours  influenza  Vaccine (HIGH DOSE) 0.7 milliLiter(s) IntraMuscular once  melatonin 3 milliGRAM(s) Oral at bedtime PRN  metoprolol succinate ER 25 milliGRAM(s) Oral daily  morphine  - Injectable 2 milliGRAM(s) IV Push every 4 hours PRN  ondansetron Injectable 4 milliGRAM(s) IV Push every 8 hours PRN  sodium chloride 0.9%. 1000 milliLiter(s) IV Continuous <Continuous>    All other medications reviewed.    SUBJECTIVE:  Demented; appears stable, not in pain.   at bedside.    VITALS:  T(C): 36.4 (02-14-23 @ 08:47), Max: 36.9 (02-14-23 @ 05:28)  T(F): 97.6 (02-14-23 @ 08:47), Max: 98.5 (02-14-23 @ 05:28)  HR: 93 (02-14-23 @ 08:47) (46 - 93)  BP: 94/64 (02-14-23 @ 08:47) (78/59 - 107/63)      PHYSICAL EXAM:  Constitutional: alert, well developed  HEENT: normocephalic, anicteric sclerae, no mucositis or thrush  Respiratory: bilateral clear to auscultation anteriorly  Cardiovascular : S1, S2 regular, rhythmic, no murmurs, gallops or rubs  Abdomen: soft, distended, + normoactive BS, no palpable HS- megaly  Extremities: no tenderness;  -c/c/e, pulses equal bilaterally    LABS:  (02-14) WBC: 15.41 K/uL,Hemoglobin: 12.4 g/dL, Hematocrit: 38.9 %,  platelet: 280 K/uL  (02-14) Na: 139 mmol/L ; K: 3.7 mmol/L ; BUN: 50 mg/dL ; Cr: 2.50 mg/dL.    RADIOLOGY:  ACC: [id number] EXAM:  US DPLX LWR EXT VEINS COMPL BI   ORDERED BY: TALI SILVA     PROCEDURE DATE:  02/14/2023          INTERPRETATION:  CLINICAL INFORMATION: Cool/discolored extremities.    COMPARISON: None available.    TECHNIQUE: Duplex sonography of the BILATERAL LOWER extremity veins with   color and spectral Doppler, with and without compression.    FINDINGS:    RIGHT:  Normal compressibility of the RIGHT common femoral, femoral and popliteal   veins.  Thrombus is noted within the right posterior tibial vein, peroneal vein   as well as gastrocnemius vein, with noncompressibility evident consistent   with DVT.    LEFT:  Normal compressibility of the LEFT common femoral, femoral and popliteal   veins.  Patency of the left posterior tibial vein is identified.  Thrombus is noted within the left peroneal vein and left soleal vein,   with noncompressibility evident consistent with DVT.    IMPRESSION:  Bilateral lower extremity DVT identified within the bilateral calf   regions, as detailed above.      ACC: [id number] EXAM:  CT ABDOMEN AND PELVIS OC   ORDERED BY: LUKASZ GOSS     ACC: [id number] EXAM:  CT CHEST   ORDERED BY: LUKASZ GOSS   PROCEDURE DATE:  02/12/2023    INTERPRETATION:  CLINICAL INFORMATION: Sepsis. Abdominal pain.  COMPARISON: 4/30/2008    CONTRAST/COMPLICATIONS:  IV Contrast: NONE  Oral Contrast: Gastroview  Complications: None reported at time of study completion    PROCEDURE:  CT of the Chest, Abdomen and Pelvis was performed.  Sagittal and coronal reformatswere performed.    FINDINGS:  CHEST:  LUNGS AND LARGE AIRWAYS: Patent central airways. Basilar airspace   opacity, left greater than right consistent with atelectasis versus   pneumonia.  PLEURA: No pleural effusion.  VESSELS: Within normal limits.  HEART: Cardiomegaly. No pericardial effusion.  MEDIASTINUM AND GEOR[place]IANA: Moderate hiatal hernia. Mediastinal adenopathy. For   reference:  *  Subcarinal lymph node measures 1.5 x 1.2 cm (2:31)  *  Pretracheal lymph node measures 1.4 x 1.2 cm (2:20)  CHESTWALL AND LOWER NECK: Within normal limits.    ABDOMEN AND PELVIS:  LIVER: Innumerable low-attenuation lesions in the liver largest measuring   approximately 3 cm, concerning for metastatic disease.  BILE DUCTS: Normal caliber.  GALLBLADDER: Layering biliary sludge  SPLEEN: Within normal limits.  PANCREAS: Mass in the head of the pancreas measuring 5.2 x 3.6 cm with   distal pancreatic ductal dilatation and atrophy, concerning for   pancreatic adenocarcinoma. Running of the peripancreatic fat.   Superimposed acute pancreatitis is a possibility.  ADRENALS: Low-attenuation thickening of the left adrenal gland likely on   the basis of underlying adrenal adenoma.  KIDNEYS/URETERS: Nonobstructing lower pole renal stone measuring 10 mm.   Punctate nonobstructing left renal stones. Left upper pole renal lesion   measures 1.8 cm, indeterminate.    BLADDER: Within normal limits.  REPRODUCTIVE ORGANS: Uterus and bilateral adnexa within normal limits.    BOWEL: No bowel obstruction. Appendix is normal.  PERITONEUM: Fluid collection along the greater curvature of the stomach   extending along the omentum measuring 6.1 x 5.0 x 8.1 cm. Nodularity   along the omentum and in the left upper abdomen. Peritoneal metastasis is   a possibility. Small amount of free fluid in the pelvis.  VESSELS: Within normal limits.  RETROPERITONEUM/LYMPH NODES: Periportal adenopathy.  ABDOMINAL WALL: Subcutaneous edema.  BONES: Osteopenia. Compression deformity of the L1 vertebral body.   Degenerative change.    IMPRESSION:  *  Findings concerning for pancreatic adenocarcinoma with metastases to   the liver.  *  Peripancreatic fat stranding and fluid collection along the greater   curvature of the stomach extending into the omentum, raising a question   of superimposed acute pancreatitis.  *  Basilar consolidation consistent with atelectasis/pneumonia.  *  Mediastinal adenopathy.

## 2023-02-14 NOTE — PROGRESS NOTE ADULT - CONVERSATION DETAILS
Further discussed prognosis and hospice as an option. Family has decided to pursue a comfort style approach thus far and agrees to hospice consult with possible home hospice.

## 2023-02-14 NOTE — PROGRESS NOTE ADULT - SUBJECTIVE AND OBJECTIVE BOX
Patient is a 88y old  Female who presents with a chief complaint of Weakness       Patient seen and examined at bedside. Noted States she feels weaker today. Denying further complaints.     ALLERGIES:  No Known Allergies    MEDICATIONS  (STANDING):  aspirin enteric coated 81 milliGRAM(s) Oral daily  atorvastatin 80 milliGRAM(s) Oral at bedtime  diatrizoate meglumine/diatrizoate sodium. 30 milliLiter(s) Oral once  heparin   Injectable 5000 Unit(s) SubCutaneous every 8 hours  influenza  Vaccine (HIGH DOSE) 0.7 milliLiter(s) IntraMuscular once  metoprolol succinate ER 25 milliGRAM(s) Oral daily  piperacillin/tazobactam IVPB.. 3.375 Gram(s) IV Intermittent every 8 hours  sodium chloride 0.9%. 1000 milliLiter(s) (75 mL/Hr) IV Continuous <Continuous>    MEDICATIONS  (PRN):  acetaminophen     Tablet .. 650 milliGRAM(s) Oral every 6 hours PRN Temp greater or equal to 38C (100.4F), Mild Pain (1 - 3)  aluminum hydroxide/magnesium hydroxide/simethicone Suspension 30 milliLiter(s) Oral every 4 hours PRN Dyspepsia  melatonin 3 milliGRAM(s) Oral at bedtime PRN Insomnia  morphine  - Injectable 2 milliGRAM(s) IV Push every 4 hours PRN Severe Pain (7 - 10)  ondansetron Injectable 4 milliGRAM(s) IV Push every 8 hours PRN Nausea and/or Vomiting    Vital Signs Last 24 Hrs  T(F): 97.6 (14 Feb 2023 08:47), Max: 98.5 (14 Feb 2023 05:28)  HR: 93 (14 Feb 2023 08:47) (46 - 93)  BP: 94/64 (14 Feb 2023 08:47) (78/59 - 107/63)  RR: 16 (14 Feb 2023 08:47) (16 - 16)  SpO2: 96% (14 Feb 2023 08:47) (93% - 100%)  I&O's Summary    13 Feb 2023 07:01  -  14 Feb 2023 07:00  --------------------------------------------------------  IN: 600 mL / OUT: 0 mL / NET: 600 mL      PHYSICAL EXAM:  General: NAD, weak, A/O x 2  ENT: MMM, no oral thrush   Neck: Supple, No JVD  Lungs: Clear to auscultation bilaterally, non labored breathing  Cardio: RRR, S1/S2, No murmurs  Abdomen: Soft, tenderness on palpation, Nondistended; Bowel sounds present  Extremities: Discolored/cool distal extremities, No calf tenderness, No pitting edema    LABS:                        12.4   15.41 )-----------( 280      ( 14 Feb 2023 06:15 )             38.9     02-14    139  |  102  |  50  ----------------------------<  124  3.7   |  21  |  2.50    Ca    8.0      14 Feb 2023 06:15  Mg     2.2     02-13    TPro  6.0  /  Alb  1.7  /  TBili  1.1  /  DBili  x   /  AST  122  /  ALT  30  /  AlkPhos  154  02-14        Lactate, Blood: 1.9 mmol/L (02-12 @ 18:15)  Lactate, Blood: 3.6 mmol/L (02-12 @ 13:30)                Culture - Blood (collected 12 Feb 2023 18:20)  Source: .Blood Blood-Peripheral  Preliminary Report (14 Feb 2023 02:01):    No growth to date.    Culture - Blood (collected 12 Feb 2023 18:15)  Source: .Blood Blood-Peripheral  Preliminary Report (14 Feb 2023 02:01):    No growth to date.    Culture - Urine (collected 10 Feb 2023 15:15)  Source: Clean Catch Clean Catch (Midstream)  Final Report (12 Feb 2023 13:28):    >=3 organisms. Probable collection contamination.          RADIOLOGY & ADDITIONAL TESTS:    Care Discussed with Consultants/Other Providers:

## 2023-02-14 NOTE — PHARMACOTHERAPY INTERVENTION NOTE - COMMENTS
Recommended to adjust piperacillin/tazobactam dose from 3.375 g IV q8h to 3.375 g IV q12h since the eGFR is 18 mL/min/1.73 m2.    Angie Moore, PharmD, [facility]  Clinical Pharmacy Specialist, Infectious Diseases  Tele-Antimicrobial Stewardship Program (Tele-ASP)  Tele-ASP Phone: [phone]

## 2023-02-14 NOTE — PHARMACOTHERAPY INTERVENTION NOTE - COMMENTS
discussed current medications with patients family  they expressed understanding  all questions answered

## 2023-02-15 LAB
ALBUMIN SERPL ELPH-MCNC: 1.6 G/DL — LOW (ref 3.3–5)
ALP SERPL-CCNC: 163 U/L — HIGH (ref 40–120)
ALT FLD-CCNC: 47 U/L — HIGH (ref 10–45)
ANION GAP SERPL CALC-SCNC: 18 MMOL/L — HIGH (ref 5–17)
APTT BLD: 42.8 SEC — HIGH (ref 27.5–35.5)
AST SERPL-CCNC: 230 U/L — HIGH (ref 10–40)
BILIRUB SERPL-MCNC: 1.3 MG/DL — HIGH (ref 0.2–1.2)
BUN SERPL-MCNC: 61 MG/DL — HIGH (ref 7–23)
CALCIUM SERPL-MCNC: 7.5 MG/DL — LOW (ref 8.4–10.5)
CHLORIDE SERPL-SCNC: 101 MMOL/L — SIGNIFICANT CHANGE UP (ref 96–108)
CO2 SERPL-SCNC: 20 MMOL/L — LOW (ref 22–31)
CREAT SERPL-MCNC: 3.87 MG/DL — HIGH (ref 0.5–1.3)
EGFR: 11 ML/MIN/1.73M2 — LOW
GLUCOSE SERPL-MCNC: 119 MG/DL — HIGH (ref 70–99)
HCT VFR BLD CALC: 38.8 % — SIGNIFICANT CHANGE UP (ref 34.5–45)
HGB BLD-MCNC: 12.5 G/DL — SIGNIFICANT CHANGE UP (ref 11.5–15.5)
INR BLD: 1.43 RATIO — HIGH (ref 0.88–1.16)
MCHC RBC-ENTMCNC: 28.9 PG — SIGNIFICANT CHANGE UP (ref 27–34)
MCHC RBC-ENTMCNC: 32.2 GM/DL — SIGNIFICANT CHANGE UP (ref 32–36)
MCV RBC AUTO: 89.6 FL — SIGNIFICANT CHANGE UP (ref 80–100)
NRBC # BLD: 0 /100 WBCS — SIGNIFICANT CHANGE UP (ref 0–0)
PLATELET # BLD AUTO: 349 K/UL — SIGNIFICANT CHANGE UP (ref 150–400)
POTASSIUM SERPL-MCNC: 4.3 MMOL/L — SIGNIFICANT CHANGE UP (ref 3.5–5.3)
POTASSIUM SERPL-SCNC: 4.3 MMOL/L — SIGNIFICANT CHANGE UP (ref 3.5–5.3)
PROT SERPL-MCNC: 6.4 G/DL — SIGNIFICANT CHANGE UP (ref 6–8.3)
PROTHROM AB SERPL-ACNC: 16.7 SEC — HIGH (ref 10.5–13.4)
RBC # BLD: 4.33 M/UL — SIGNIFICANT CHANGE UP (ref 3.8–5.2)
RBC # FLD: 14.8 % — HIGH (ref 10.3–14.5)
SARS-COV-2 RNA SPEC QL NAA+PROBE: SIGNIFICANT CHANGE UP
SODIUM SERPL-SCNC: 139 MMOL/L — SIGNIFICANT CHANGE UP (ref 135–145)
WBC # BLD: 13.55 K/UL — HIGH (ref 3.8–10.5)
WBC # FLD AUTO: 13.55 K/UL — HIGH (ref 3.8–10.5)

## 2023-02-15 PROCEDURE — 99232 SBSQ HOSP IP/OBS MODERATE 35: CPT

## 2023-02-15 PROCEDURE — 99233 SBSQ HOSP IP/OBS HIGH 50: CPT

## 2023-02-15 RX ORDER — MORPHINE SULFATE 50 MG/1
1 CAPSULE, EXTENDED RELEASE ORAL EVERY 6 HOURS
Refills: 0 | Status: DISCONTINUED | OUTPATIENT
Start: 2023-02-15 | End: 2023-02-16

## 2023-02-15 RX ORDER — SENNA PLUS 8.6 MG/1
2 TABLET ORAL AT BEDTIME
Refills: 0 | Status: DISCONTINUED | OUTPATIENT
Start: 2023-02-15 | End: 2023-02-16

## 2023-02-15 RX ADMIN — MORPHINE SULFATE 1 MILLIGRAM(S): 50 CAPSULE, EXTENDED RELEASE ORAL at 11:44

## 2023-02-15 RX ADMIN — PIPERACILLIN AND TAZOBACTAM 25 GRAM(S): 4; .5 INJECTION, POWDER, LYOPHILIZED, FOR SOLUTION INTRAVENOUS at 13:57

## 2023-02-15 RX ADMIN — Medication 81 MILLIGRAM(S): at 11:45

## 2023-02-15 RX ADMIN — PIPERACILLIN AND TAZOBACTAM 25 GRAM(S): 4; .5 INJECTION, POWDER, LYOPHILIZED, FOR SOLUTION INTRAVENOUS at 02:42

## 2023-02-15 RX ADMIN — HEPARIN SODIUM 5000 UNIT(S): 5000 INJECTION INTRAVENOUS; SUBCUTANEOUS at 05:55

## 2023-02-15 RX ADMIN — MORPHINE SULFATE 1 MILLIGRAM(S): 50 CAPSULE, EXTENDED RELEASE ORAL at 12:00

## 2023-02-15 NOTE — PROGRESS NOTE ADULT - PROBLEM SELECTOR PLAN 1
Pancreatic cancer with metastases to the liver.  Reviewed today's lower extremity DVT consistent with bilateral multiple venous thrombosis identified in both calf regions.  Despite the lack of histologic diagnosis, imaging results are highly suggestive of a pancreatic malignancy, which is a highly hypercoagulable condition, explaining multiple DVTs.  Anticoagulation should be indefinitely.  Patient is an unlikely candidate for systemic chemotherapy.  As discussed with patient's offsprings, patient is a candidate for comfort care/hospice referral. Likely pancreatic neoplasm with liver metastasis and secondary hypercoagulable state with bilateral DVTs.  Patient apparently rapidly declining, and unlikely candidate for systemic chemotherapy.  [facility]; continue morphine as needed.  As discussed, hospice referral is indicated due to advanced, rapidly paced malignancy.  Family aware and in agreement with the plan.

## 2023-02-15 NOTE — PROGRESS NOTE ADULT - NS ATTEND AMEND GEN_ALL_CORE FT
88F with HTN, mild dementia, HLD, comes to hospital for weakness, found to be clinically dehydrated, noted to have ANGELIA, weakness and now findings of pancreatic cancer with  metastasis to liver plan: cont iv hydration and antibx, discuss current disease state with patient and fam, primary hcp is yossi, secodnary is bianca, saravananrec hemat/onco on recs for treatment if pt wants to pursue, pt could be approp for hospice if supportive care elected
88F with HTN, mild dementia, HLD, comes to hospital for weakness, found to be clinically dehydrated, admitted with findinngs of advanced pancreatic cancer with metastasis, pt met criteria for inpatient hospice and comfort measures, discussed with Dr BENJY Osorio for inpatient hospice who kindly accepted however no beds at this time, family agrees with plan, supportive care, pt is end of life with tachypneic breathing and pain cont iv morphine standing for end of life care
88F with HTN, mild dementia, HLD, comes to hospital for weakness, found to be clinically dehydrated, noted to have ANGELIA, weakness and now findings of pancreatic cancer with  metastasis to liver plan: cont iv hydration, multiple dvts, lovneox for now, supportive care, pt is appropiate for comfort measure only,  discuss current disease state with patient and fam, primary hcp is yossi, secodnary is bianca, apprec hemat/onco on recs for treatment if pt wants to pursue, pt could be approp for hospice if supportive care elected

## 2023-02-15 NOTE — PROGRESS NOTE ADULT - TIME BILLING
care coordination, plan of care discussed with patient face to face, tele IDR team
care coordination, plan of care discussed with patient face to face, tele IDR team. pt and family at bedside
care coordination, plan of care discussed with patient face to face, tele IDR team. pt and family at bedside

## 2023-02-15 NOTE — PROGRESS NOTE ADULT - ASSESSMENT
88F with HTN, mild dementia, HLD, comes to hospital for weakness, found to be clinically dehydrated, noted to have ANGELIA    #Concern for pancreatic malignancy with mets to liver  #Weakness in the setting of dehydration vs malignancy   #ANGELIA   #Elevated LFT's  -CT findings noted above. Discussed with patient and lashon Palacios at bedside.   -FU Heme/Onc recs   -ANGELIA worsening, Hold ACEi and diuretic, restart IVF  -encourage PO intake  -hospice consult   -monitor labs    #Pneumonia  #Leukocytosis  -CT findings noted above  -pro xu and wbc elevated  - Patient is afebrile, HD stable  -Continue Zosyn   -BC NGTD  -UC likely contaminated       #Hypokalemia  -resolved  -Will monitor    #Essential HTN  -c/w Toprol  -hold ACEi as above    #DVT ppx: HSQ    AM labs  DISP: Pending Hospice consult   DNR/DNI, MOLST previously filled out at bedside with HCP lashon Oneill    Updated lashon Palacios at bedside.       
88F with HTN, mild dementia, HLD, comes to hospital for weakness, found to be clinically dehydrated, noted to have ANGELIA    #Weakness in the setting of dehydration  #ANGELIA, pre-renal likely in nature  -Hold ACEi and diuretic  -c/w IVF, noted increase in creatinine today  -PT eval done; suggested home with home care    #Leukocytosis  - Patient is afebrile, HD stable  - UCX pending  - Will monitor F/WBC count    #Hypokalemia  -Will monitor    #Essential HTN  -c/w Toprol  -hold ACEi as above    #DVT ppx: HSQ    AM labs  DISP: Possible d/c in AM if creatinine improved, UCX results and leukocytosis improves  DNR/DNI, MOLST filled out at bedside with HCP lashon Nino    2/12: I called HCP lashon Oneill did not  phone [phone]  I called other son Juanjose [phone], left message  Will call both/either again      
88F with HTN, mild dementia, HLD, comes to hospital for weakness, found to be clinically dehydrated, noted to have ANGELIA    #Weakness in the setting of dehydration  #ANGELIA, pre-renal, improved  -Hold ACEi and diuretic  -c/w IVF  -PT eval    #Hypokalemia  -Will replace K, monitor    #Essential HTN  -c/w Toprol  -hold ACEi as above    #Leukocytosis, improved  -no signs/sympt  -repeat CBC in AM  -monitor off antibiotics     #DVT ppx: HSQ    DISP: Possible d/c in AM  
88F with HTN, mild dementia, HLD, comes to hospital for weakness, found to be clinically dehydrated, noted to have ANGELIA    #Concern for pancreatic malignancy with mets to liver  #Weakness in the setting of dehydration vs malignancy   #ANGELIA, pre-renal likely in nature  -CT findings noted above. Discussed with patient and lashon Oneill at bedside.   -FU Heme/Onc recs   -Hold ACEi and diuretic  -s/p IVF, encourage PO intake  -PT eval done; suggested home with home care    #Pneumonia  #Leukocytosis  -CT findings noted above  -pro xu and wbc elevated  - Patient is afebrile, HD stable  -Continue Zosyn   -FU BC  -UC likely contaminated       #Hypokalemia  -Will monitor    #Essential HTN  -c/w Toprol  -hold ACEi as above    #DVT ppx: HSQ    AM labs  DISP: Possible d/c in AM if creatinine improved and leukocytosis improves  DNR/DNI, MOLST previously filled out at bedside with HCP lashon Oneill    Updated lashon Oneill at beside.       
88F with HTN, mild dementia, HLD, comes to hospital for weakness, found to be clinically dehydrated, noted to have ANGELIA.    #Probable Pancreatic malignancy with mets to liver  #Transaminitis sec. to above  #Weakness in the setting of dehydration vs malignancy   #AGNELIA, worsening Creat 3.87 today  -CT findings noted   -Heme/Onc recs; pt not a candidate for chemorx  -Hold ACEi and diuretic  -pt not eating or drinking well, cont IVF  -Hospice referral in, [facility]-- pending Doc to Doc for acceptance  -cont Morphine prn  -cont IVF    #DVT's, b/l (sec to malignancy)  -Dopplers:  b/l lower ext DVT's  -pt does not want AC, want comfort care    #Hypotension  -cont IVF    #Pneumonia  #Leukocytosis  -CT findings noted above  -procalc and wbc elevated  -pt with no fevers  -Continue Zosyn     #Hypokalemia; resolved  -Will monitor    #Essential HTN  -c/w Toprol  -hold ACEi as above    #DVT ppx: stopped Hep    DNR/DNI, MOLST  in chart    Dispo:  made pt Comfort Care today per family request.  Updated son Alphonso and other brother at bedside.  They would prefer [facility].

## 2023-02-15 NOTE — PROGRESS NOTE ADULT - SUBJECTIVE AND OBJECTIVE BOX
Patient is a 88y old  Female who presents with a chief complaint of Weakness (14 Feb 2023 18:33)      Patient seen and examined at bedside. No overnight events reported.  Pt c/o abd pain.    ALLERGIES:  No Known Allergies    MEDICATIONS  (STANDING):  aspirin enteric coated 81 milliGRAM(s) Oral daily  diatrizoate meglumine/diatrizoate sodium. 30 milliLiter(s) Oral once  influenza  Vaccine (HIGH DOSE) 0.7 milliLiter(s) IntraMuscular once  metoprolol succinate ER 25 milliGRAM(s) Oral daily  piperacillin/tazobactam IVPB.. 3.375 Gram(s) IV Intermittent every 12 hours  senna 2 Tablet(s) Oral at bedtime  sodium chloride 0.9%. 1000 milliLiter(s) (75 mL/Hr) IV Continuous <Continuous>    MEDICATIONS  (PRN):  acetaminophen     Tablet .. 650 milliGRAM(s) Oral every 6 hours PRN Temp greater or equal to 38C (100.4F), Mild Pain (1 - 3)  aluminum hydroxide/magnesium hydroxide/simethicone Suspension 30 milliLiter(s) Oral every 4 hours PRN Dyspepsia  bisacodyl 5 milliGRAM(s) Oral every 12 hours PRN Constipation  melatonin 3 milliGRAM(s) Oral at bedtime PRN Insomnia  morphine  - Injectable 2 milliGRAM(s) IV Push every 4 hours PRN Severe Pain (7 - 10)  morphine  - Injectable 1 milliGRAM(s) IV Push every 6 hours PRN Moderate Pain (4 - 6)  ondansetron Injectable 4 milliGRAM(s) IV Push every 8 hours PRN Nausea and/or Vomiting    Vital Signs Last 24 Hrs  T(F): 97.4 (15 Feb 2023 05:44), Max: 98.3 (14 Feb 2023 20:54)  HR: 89 (15 Feb 2023 05:44) (89 - 121)  BP: 107/78 (15 Feb 2023 05:44) (88/63 - 107/78)  RR: 18 (15 Feb 2023 05:44) (16 - 18)  SpO2: 100% (15 Feb 2023 05:44) (91% - 100%)  I&O's Summary    14 Feb 2023 07:01  -  15 Feb 2023 07:00  --------------------------------------------------------  IN: 200 mL / OUT: 0 mL / NET: 200 mL      PHYSICAL EXAM:  General: NAD, A/O x 2, appears ill.  ENT: No gross hearing impairment, Moist mucous membranes, no thrush  Neck: Supple, No JVD  Lungs: non-labored breathing, poor inspiratory effort, otherwise CTA b/l  Cardio: RRR, S1/S2, No murmur  Abdomen: distended, tender to deep palpation, Bowel sounds present  Extremities: ++ cyanosis of hands and feet, skin cool to touch, No pitting edema  Psych: Appropriate mood and affect    LABS:                        12.5   13.55 )-----------( 349      ( 15 Feb 2023 07:00 )             38.8     02-15    139  |  101  |  61  ----------------------------<  119  4.3   |  20  |  3.87    Ca    7.5      15 Feb 2023 07:00  Mg     2.2     02-13    TPro  6.4  /  Alb  1.6  /  TBili  1.3  /  DBili  x   /  AST  230  /  ALT  47  /  AlkPhos  163  02-15          PT/INR - ( 15 Feb 2023 07:00 )   PT: 16.7 sec;   INR: 1.43 ratio         PTT - ( 15 Feb 2023 07:00 )  PTT:42.8 sec  Lactate, Blood: 1.9 mmol/L (02-12 @ 18:15)  Lactate, Blood: 3.6 mmol/L (02-12 @ 13:30)    Procalcitonin, Serum: 1.41 ng/mL (02-13-23 @ 08:06)                              Culture - Blood (collected 12 Feb 2023 18:20)  Source: .Blood Blood-Peripheral  Preliminary Report (14 Feb 2023 02:01):    No growth to date.    Culture - Blood (collected 12 Feb 2023 18:15)  Source: .Blood Blood-Peripheral  Preliminary Report (14 Feb 2023 02:01):    No growth to date.    Culture - Urine (collected 10 Feb 2023 15:15)  Source: Clean Catch Clean Catch (Midstream)  Final Report (12 Feb 2023 13:28):    >=3 organisms. Probable collection contamination.        RADIOLOGY & ADDITIONAL TESTS:  < from: US Duplex Venous Lower Ext Complete, Bilateral (02.14.23 @ 12:29) >    IMPRESSION:  Bilateral lower extremity DVT identified within the bilateral calf   regions, as detailed above.    < end of copied text >  < from: CT Abdomen and Pelvis w/ Oral Cont (02.12.23 @ 18:47) >    IMPRESSION:  *  Findings concerning for pancreatic adenocarcinoma with metastases to   the liver.  *  Peripancreatic fat stranding and fluid collection along the greater   curvature of the stomach extending into the omentum, raising a question   of superimposed acute pancreatitis.  *  Basilar consolidation consistent with atelectasis/pneumonia.  *  Mediastinal adenopathy.    < end of copied text >    Care Discussed with Consultants/Other Providers:

## 2023-02-15 NOTE — PROGRESS NOTE ADULT - SUBJECTIVE AND OBJECTIVE BOX
NEIL MOORE, 88y Female  MRN: [id number]  ATTENDING: Miguel Angel Cummings    HPI:  88F, PMHx HTN, mild dementia, HLD, admitted to [facility] with failure to thrive.  Patient responded to IV fluids and was treated for cough.  She was overall stable clinically.  A CT A/P showed findings concerning for pancreatic adenocarcinoma with metastasis to the liver, peripancreatic fat stranding and fluid collection along the greater curvature of the stomach extending into the omentum, raising questions for acute pancreatitis, mediastinal adenopathy and basilar consolidation consistent with atelectasis and pneumonia.  No tissue diagnosis available for review.  Oncology consulted in light of above.    MEDICATIONS:  acetaminophen     Tablet .. 650 milliGRAM(s) Oral every 6 hours PRN  aluminum hydroxide/magnesium hydroxide/simethicone Suspension 30 milliLiter(s) Oral every 4 hours PRN  aspirin enteric coated 81 milliGRAM(s) Oral daily  atorvastatin 80 milliGRAM(s) Oral at bedtime  diatrizoate meglumine/diatrizoate sodium. 30 milliLiter(s) Oral once  heparin   Injectable 5000 Unit(s) SubCutaneous every 8 hours  influenza  Vaccine (HIGH DOSE) 0.7 milliLiter(s) IntraMuscular once  melatonin 3 milliGRAM(s) Oral at bedtime PRN  metoprolol succinate ER 25 milliGRAM(s) Oral daily  morphine  - Injectable 2 milliGRAM(s) IV Push every 4 hours PRN  ondansetron Injectable 4 milliGRAM(s) IV Push every 8 hours PRN  sodium chloride 0.9%. 1000 milliLiter(s) IV Continuous <Continuous>    All other medications reviewed.    SUBJECTIVE:  Demented; appears stable, not in pain.   at bedside.    VITALS:  T(C): 36.4 (02-14-23 @ 08:47), Max: 36.9 (02-14-23 @ 05:28)  T(F): 97.6 (02-14-23 @ 08:47), Max: 98.5 (02-14-23 @ 05:28)  HR: 93 (02-14-23 @ 08:47) (46 - 93)  BP: 94/64 (02-14-23 @ 08:47) (78/59 - 107/63)      PHYSICAL EXAM:  Constitutional: alert, well developed  HEENT: normocephalic, anicteric sclerae, no mucositis or thrush  Respiratory: bilateral clear to auscultation anteriorly  Cardiovascular : S1, S2 regular, rhythmic, no murmurs, gallops or rubs  Abdomen: soft, distended, + normoactive BS, no palpable HS- megaly  Extremities: no tenderness;  -c/c/e, pulses equal bilaterally    LABS:  (02-14) WBC: 15.41 K/uL,Hemoglobin: 12.4 g/dL, Hematocrit: 38.9 %,  platelet: 280 K/uL  (02-14) Na: 139 mmol/L ; K: 3.7 mmol/L ; BUN: 50 mg/dL ; Cr: 2.50 mg/dL.    RADIOLOGY:  ACC: [id number] EXAM:  US DPLX LWR EXT VEINS COMPL BI   ORDERED BY: TALI SILVA     PROCEDURE DATE:  02/14/2023          INTERPRETATION:  CLINICAL INFORMATION: Cool/discolored extremities.    COMPARISON: None available.    TECHNIQUE: Duplex sonography of the BILATERAL LOWER extremity veins with   color and spectral Doppler, with and without compression.    FINDINGS:    RIGHT:  Normal compressibility of the RIGHT common femoral, femoral and popliteal   veins.  Thrombus is noted within the right posterior tibial vein, peroneal vein   as well as gastrocnemius vein, with noncompressibility evident consistent   with DVT.    LEFT:  Normal compressibility of the LEFT common femoral, femoral and popliteal   veins.  Patency of the left posterior tibial vein is identified.  Thrombus is noted within the left peroneal vein and left soleal vein,   with noncompressibility evident consistent with DVT.    IMPRESSION:  Bilateral lower extremity DVT identified within the bilateral calf   regions, as detailed above.      ACC: [id number] EXAM:  CT ABDOMEN AND PELVIS OC   ORDERED BY: LUKASZ GOSS     ACC: [id number] EXAM:  CT CHEST   ORDERED BY: LUKASZ GOSS   PROCEDURE DATE:  02/12/2023    INTERPRETATION:  CLINICAL INFORMATION: Sepsis. Abdominal pain.  COMPARISON: 4/30/2008    CONTRAST/COMPLICATIONS:  IV Contrast: NONE  Oral Contrast: Gastroview  Complications: None reported at time of study completion    PROCEDURE:  CT of the Chest, Abdomen and Pelvis was performed.  Sagittal and coronal reformatswere performed.    FINDINGS:  CHEST:  LUNGS AND LARGE AIRWAYS: Patent central airways. Basilar airspace   opacity, left greater than right consistent with atelectasis versus   pneumonia.  PLEURA: No pleural effusion.  VESSELS: Within normal limits.  HEART: Cardiomegaly. No pericardial effusion.  MEDIASTINUM AND GEORGIANA: Moderate hiatal hernia. Mediastinal adenopathy. For   reference:  *  Subcarinal lymph node measures 1.5 x 1.2 cm (2:31)  *  Pretracheal lymph node measures 1.4 x 1.2 cm (2:20)  CHESTWALL AND LOWER NECK: Within normal limits.    ABDOMEN AND PELVIS:  LIVER: Innumerable low-attenuation lesions in the liver largest measuring   approximately 3 cm, concerning for metastatic disease.  BILE DUCTS: Normal caliber.  GALLBLADDER: Layering biliary sludge  SPLEEN: Within normal limits.  PANCREAS: Mass in the head of the pancreas measuring 5.2 x 3.6 cm with   distal pancreatic ductal dilatation and atrophy, concerning for   pancreatic adenocarcinoma. Running of the peripancreatic fat.   Superimposed acute pancreatitis is a possibility.  ADRENALS: Low-attenuation thickening of the left adrenal gland likely on   the basis of underlying adrenal adenoma.  KIDNEYS/URETERS: Nonobstructing lower pole renal stone measuring 10 mm.   Punctate nonobstructing left renal stones. Left upper pole renal lesion   measures 1.8 cm, indeterminate.    BLADDER: Within normal limits.  REPRODUCTIVE ORGANS: Uterus and bilateral adnexa within normal limits.    BOWEL: No bowel obstruction. Appendix is normal.  PERITONEUM: Fluid collection along the greater curvature of the stomach   extending along the omentum measuring 6.1 x 5.0 x 8.1 cm. Nodularity   along the omentum and in the left upper abdomen. Peritoneal metastasis is   a possibility. Small amount of free fluid in the pelvis.  VESSELS: Within normal limits.  RETROPERITONEUM/LYMPH NODES: Periportal adenopathy.  ABDOMINAL WALL: Subcutaneous edema.  BONES: Osteopenia. Compression deformity of the L1 vertebral body.   Degenerative change.    IMPRESSION:  *  Findings concerning for pancreatic adenocarcinoma with metastases to   the liver.  *  Peripancreatic fat stranding and fluid collection along the greater   curvature of the stomach extending into the omentum, raising a question   of superimposed acute pancreatitis.  *  Basilar consolidation consistent with atelectasis/pneumonia.  *  Mediastinal adenopathy.       NEIL MOORE, 88y Female  MRN: [id number]  ATTENDING: Miguel Angel Cummings    HPI:  88F, PMHx HTN, mild dementia, HLD, admitted to [facility] with failure to thrive.  Patient responded to IV fluids and was treated for cough.  She was overall stable clinically.  A CT A/P showed findings concerning for pancreatic adenocarcinoma with metastasis to the liver, peripancreatic fat stranding and fluid collection along the greater curvature of the stomach extending into the omentum, raising questions for acute pancreatitis, mediastinal adenopathy and basilar consolidation consistent with atelectasis and pneumonia.  No tissue diagnosis available for review.  Oncology consulted in light of above.    MEDICATIONS:  acetaminophen     Tablet .. 650 milliGRAM(s) Oral every 6 hours PRN  aluminum hydroxide/magnesium hydroxide/simethicone Suspension 30 milliLiter(s) Oral every 4 hours PRN  aspirin enteric coated 81 milliGRAM(s) Oral daily  atorvastatin 80 milliGRAM(s) Oral at bedtime  diatrizoate meglumine/diatrizoate sodium. 30 milliLiter(s) Oral once  heparin   Injectable 5000 Unit(s) SubCutaneous every 8 hours  influenza  Vaccine (HIGH DOSE) 0.7 milliLiter(s) IntraMuscular once  melatonin 3 milliGRAM(s) Oral at bedtime PRN  metoprolol succinate ER 25 milliGRAM(s) Oral daily  morphine  - Injectable 2 milliGRAM(s) IV Push every 4 hours PRN  ondansetron Injectable 4 milliGRAM(s) IV Push every 8 hours PRN  sodium chloride 0.9%. 1000 milliLiter(s) IV Continuous <Continuous>    All other medications reviewed.    SUBJECTIVE:  no changes in clinical status in last 24 hrs.    VITALS:  T(C): 36.4 (02-14-23 @ 08:47), Max: 36.9 (02-14-23 @ 05:28)  T(F): 97.6 (02-14-23 @ 08:47), Max: 98.5 (02-14-23 @ 05:28)  HR: 93 (02-14-23 @ 08:47) (46 - 93)  BP: 94/64 (02-14-23 @ 08:47) (78/59 - 107/63)      PHYSICAL EXAM:  Constitutional: alert, well developed  HEENT: normocephalic, anicteric sclerae, no mucositis or thrush  Respiratory: bilateral clear to auscultation anteriorly  Cardiovascular : S1, S2 regular, rhythmic, no murmurs, gallops or rubs  Abdomen: soft, distended, + normoactive BS, no palpable HS- megaly  Extremities: no tenderness;  -c/c/e, pulses equal bilaterally    LABS:  (02-14) WBC: 15.41 K/uL,Hemoglobin: 12.4 g/dL, Hematocrit: 38.9 %,  platelet: 280 K/uL  (02-14) Na: 139 mmol/L ; K: 3.7 mmol/L ; BUN: 50 mg/dL ; Cr: 2.50 mg/dL.    RADIOLOGY:  ACC: [id number] EXAM:  US DPLX LWR EXT VEINS COMPL BI   ORDERED BY: TALI SILVA     PROCEDURE DATE:  02/14/2023          INTERPRETATION:  CLINICAL INFORMATION: Cool/discolored extremities.    COMPARISON: None available.    TECHNIQUE: Duplex sonography of the BILATERAL LOWER extremity veins with   color and spectral Doppler, with and without compression.    FINDINGS:    RIGHT:  Normal compressibility of the RIGHT common femoral, femoral and popliteal   veins.  Thrombus is noted within the right posterior tibial vein, peroneal vein   as well as gastrocnemius vein, with noncompressibility evident consistent   with DVT.    LEFT:  Normal compressibility of the LEFT common femoral, femoral and popliteal   veins.  Patency of the left posterior tibial vein is identified.  Thrombus is noted within the left peroneal vein and left soleal vein,   with noncompressibility evident consistent with DVT.    IMPRESSION:  Bilateral lower extremity DVT identified within the bilateral calf   regions, as detailed above.      ACC: [id number] EXAM:  CT ABDOMEN AND PELVIS OC   ORDERED BY: LUKASZ GOSS     ACC: [id number] EXAM:  CT CHEST   ORDERED BY: LUKASZ GOSS   PROCEDURE DATE:  02/12/2023    INTERPRETATION:  CLINICAL INFORMATION: Sepsis. Abdominal pain.  COMPARISON: 4/30/2008    CONTRAST/COMPLICATIONS:  IV Contrast: NONE  Oral Contrast: Gastroview  Complications: None reported at time of study completion    PROCEDURE:  CT of the Chest, Abdomen and Pelvis was performed.  Sagittal and coronal reformatswere performed.    FINDINGS:  CHEST:  LUNGS AND LARGE AIRWAYS: Patent central airways. Basilar airspace   opacity, left greater than right consistent with atelectasis versus   pneumonia.  PLEURA: No pleural effusion.  VESSELS: Within normal limits.  HEART: Cardiomegaly. No pericardial effusion.  MEDIASTINUM AND GEORGIANA: Moderate hiatal hernia. Mediastinal adenopathy. For   reference:  *  Subcarinal lymph node measures 1.5 x 1.2 cm (2:31)  *  Pretracheal lymph node measures 1.4 x 1.2 cm (2:20)  CHESTWALL AND LOWER NECK: Within normal limits.    ABDOMEN AND PELVIS:  LIVER: Innumerable low-attenuation lesions in the liver largest measuring   approximately 3 cm, concerning for metastatic disease.  BILE DUCTS: Normal caliber.  GALLBLADDER: Layering biliary sludge  SPLEEN: Within normal limits.  PANCREAS: Mass in the head of the pancreas measuring 5.2 x 3.6 cm with   distal pancreatic ductal dilatation and atrophy, concerning for   pancreatic adenocarcinoma. Running of the peripancreatic fat.   Superimposed acute pancreatitis is a possibility.  ADRENALS: Low-attenuation thickening of the left adrenal gland likely on   the basis of underlying adrenal adenoma.  KIDNEYS/URETERS: Nonobstructing lower pole renal stone measuring 10 mm.   Punctate nonobstructing left renal stones. Left upper pole renal lesion   measures 1.8 cm, indeterminate.    BLADDER: Within normal limits.  REPRODUCTIVE ORGANS: Uterus and bilateral adnexa within normal limits.    BOWEL: No bowel obstruction. Appendix is normal.  PERITONEUM: Fluid collection along the greater curvature of the stomach   extending along the omentum measuring 6.1 x 5.0 x 8.1 cm. Nodularity   along the omentum and in the left upper abdomen. Peritoneal metastasis is   a possibility. Small amount of free fluid in the pelvis.  VESSELS: Within normal limits.  RETROPERITONEUM/LYMPH NODES: Periportal adenopathy.  ABDOMINAL WALL: Subcutaneous edema.  BONES: Osteopenia. Compression deformity of the L1 vertebral body.   Degenerative change.    IMPRESSION:  *  Findings concerning for pancreatic adenocarcinoma with metastases to   the liver.  *  Peripancreatic fat stranding and fluid collection along the greater   curvature of the stomach extending into the omentum, raising a question   of superimposed acute pancreatitis.  *  Basilar consolidation consistent with atelectasis/pneumonia.  *  Mediastinal adenopathy.

## 2023-02-16 VITALS
SYSTOLIC BLOOD PRESSURE: 143 MMHG | DIASTOLIC BLOOD PRESSURE: 96 MMHG | RESPIRATION RATE: 25 BRPM | HEIGHT: 63 IN | WEIGHT: 149.91 LBS | OXYGEN SATURATION: 98 %

## 2023-02-16 PROCEDURE — 80053 COMPREHEN METABOLIC PANEL: CPT

## 2023-02-16 PROCEDURE — 87635 SARS-COV-2 COVID-19 AMP PRB: CPT

## 2023-02-16 PROCEDURE — 97161 PT EVAL LOW COMPLEX 20 MIN: CPT

## 2023-02-16 PROCEDURE — 87040 BLOOD CULTURE FOR BACTERIA: CPT

## 2023-02-16 PROCEDURE — 80076 HEPATIC FUNCTION PANEL: CPT

## 2023-02-16 PROCEDURE — 85025 COMPLETE CBC W/AUTO DIFF WBC: CPT

## 2023-02-16 PROCEDURE — 85610 PROTHROMBIN TIME: CPT

## 2023-02-16 PROCEDURE — 96360 HYDRATION IV INFUSION INIT: CPT

## 2023-02-16 PROCEDURE — 84145 PROCALCITONIN (PCT): CPT

## 2023-02-16 PROCEDURE — 36415 COLL VENOUS BLD VENIPUNCTURE: CPT

## 2023-02-16 PROCEDURE — 0225U NFCT DS DNA&RNA 21 SARSCOV2: CPT

## 2023-02-16 PROCEDURE — 74176 CT ABD & PELVIS W/O CONTRAST: CPT

## 2023-02-16 PROCEDURE — 93005 ELECTROCARDIOGRAM TRACING: CPT

## 2023-02-16 PROCEDURE — 93970 EXTREMITY STUDY: CPT

## 2023-02-16 PROCEDURE — 85027 COMPLETE CBC AUTOMATED: CPT

## 2023-02-16 PROCEDURE — 99232 SBSQ HOSP IP/OBS MODERATE 35: CPT

## 2023-02-16 PROCEDURE — 81001 URINALYSIS AUTO W/SCOPE: CPT

## 2023-02-16 PROCEDURE — 87086 URINE CULTURE/COLONY COUNT: CPT

## 2023-02-16 PROCEDURE — 71045 X-RAY EXAM CHEST 1 VIEW: CPT

## 2023-02-16 PROCEDURE — 85730 THROMBOPLASTIN TIME PARTIAL: CPT

## 2023-02-16 PROCEDURE — 84100 ASSAY OF PHOSPHORUS: CPT

## 2023-02-16 PROCEDURE — 83735 ASSAY OF MAGNESIUM: CPT

## 2023-02-16 PROCEDURE — 80048 BASIC METABOLIC PNL TOTAL CA: CPT

## 2023-02-16 PROCEDURE — 99285 EMERGENCY DEPT VISIT HI MDM: CPT

## 2023-02-16 PROCEDURE — 99239 HOSP IP/OBS DSCHRG MGMT >30: CPT

## 2023-02-16 PROCEDURE — 71250 CT THORAX DX C-: CPT

## 2023-02-16 PROCEDURE — 83605 ASSAY OF LACTIC ACID: CPT

## 2023-02-16 RX ORDER — MORPHINE SULFATE 50 MG/1
2 CAPSULE, EXTENDED RELEASE ORAL
Qty: 0 | Refills: 0 | DISCHARGE
Start: 2023-02-16

## 2023-02-16 RX ORDER — SENNA PLUS 8.6 MG/1
2 TABLET ORAL
Qty: 0 | Refills: 0 | DISCHARGE
Start: 2023-02-16

## 2023-02-16 RX ORDER — IPRATROPIUM/ALBUTEROL SULFATE 18-103MCG
3 AEROSOL WITH ADAPTER (GRAM) INHALATION ONCE
Refills: 0 | Status: DISCONTINUED | OUTPATIENT
Start: 2023-02-16 | End: 2023-02-16

## 2023-02-16 RX ORDER — ALBUTEROL 90 UG/1
2 AEROSOL, METERED ORAL EVERY 6 HOURS
Refills: 0 | Status: DISCONTINUED | OUTPATIENT
Start: 2023-02-16 | End: 2023-02-16

## 2023-02-16 RX ORDER — LOSARTAN POTASSIUM 100 MG/1
1 TABLET, FILM COATED ORAL
Qty: 0 | Refills: 0 | DISCHARGE

## 2023-02-16 RX ORDER — IPRATROPIUM/ALBUTEROL SULFATE 18-103MCG
3 AEROSOL WITH ADAPTER (GRAM) INHALATION
Qty: 0 | Refills: 0 | DISCHARGE
Start: 2023-02-16

## 2023-02-16 RX ADMIN — SODIUM CHLORIDE 75 MILLILITER(S): 9 INJECTION INTRAMUSCULAR; INTRAVENOUS; SUBCUTANEOUS at 05:59

## 2023-02-16 RX ADMIN — MORPHINE SULFATE 1 MILLIGRAM(S): 50 CAPSULE, EXTENDED RELEASE ORAL at 10:47

## 2023-02-16 RX ADMIN — PIPERACILLIN AND TAZOBACTAM 25 GRAM(S): 4; .5 INJECTION, POWDER, LYOPHILIZED, FOR SOLUTION INTRAVENOUS at 03:03

## 2023-02-16 NOTE — DISCHARGE NOTE NURSING/CASE MANAGEMENT/SOCIAL WORK - PATIENT PORTAL LINK FT
You can access the FollowMyHealth Patient Portal offered by [facility] by registering at the following website: [email or web address]. By joining [facility]’s FollowMyHealth portal, you will also be able to view your health information using other applications (apps) compatible with our system.

## 2023-02-16 NOTE — PROGRESS NOTE ADULT - SUBJECTIVE AND OBJECTIVE BOX
NEIL MOORE, 88y Female  MRN: [id number]  ATTENDING: Miguel Angel Cummings    HPI:  88F, PMHx HTN, mild dementia, HLD, admitted to [facility] with failure to thrive.  Patient responded to IV fluids and was treated for cough.  She was overall stable clinically.  A CT A/P showed findings concerning for pancreatic adenocarcinoma with metastasis to the liver, peripancreatic fat stranding and fluid collection along the greater curvature of the stomach extending into the omentum, raising questions for acute pancreatitis, mediastinal adenopathy and basilar consolidation consistent with atelectasis and pneumonia.  No tissue diagnosis available for review.  Oncology consulted in light of above.    MEDICATIONS:  acetaminophen     Tablet .. 650 milliGRAM(s) Oral every 6 hours PRN  aluminum hydroxide/magnesium hydroxide/simethicone Suspension 30 milliLiter(s) Oral every 4 hours PRN  aspirin enteric coated 81 milliGRAM(s) Oral daily  atorvastatin 80 milliGRAM(s) Oral at bedtime  diatrizoate meglumine/diatrizoate sodium. 30 milliLiter(s) Oral once  heparin   Injectable 5000 Unit(s) SubCutaneous every 8 hours  influenza  Vaccine (HIGH DOSE) 0.7 milliLiter(s) IntraMuscular once  melatonin 3 milliGRAM(s) Oral at bedtime PRN  metoprolol succinate ER 25 milliGRAM(s) Oral daily  morphine  - Injectable 2 milliGRAM(s) IV Push every 4 hours PRN  ondansetron Injectable 4 milliGRAM(s) IV Push every 8 hours PRN  sodium chloride 0.9%. 1000 milliLiter(s) IV Continuous <Continuous>    All other medications reviewed.    SUBJECTIVE:  appears non-toxic, NAD. Dementia    VITALS:  T(C): 36.4 (02-14-23 @ 08:47), Max: 36.9 (02-14-23 @ 05:28)  T(F): 97.6 (02-14-23 @ 08:47), Max: 98.5 (02-14-23 @ 05:28)  HR: 93 (02-14-23 @ 08:47) (46 - 93)  BP: 94/64 (02-14-23 @ 08:47) (78/59 - 107/63)      PHYSICAL EXAM:  Constitutional: alert, well developed  HEENT: normocephalic, anicteric sclerae, no mucositis or thrush  Respiratory: bilateral clear to auscultation anteriorly  Cardiovascular : S1, S2 regular, rhythmic, no murmurs, gallops or rubs  Abdomen: soft, distended, + normoactive BS, no palpable HS- megaly  Extremities: no tenderness;  -c/c/e, pulses equal bilaterally    LABS:  (02-14) WBC: 15.41 K/uL,Hemoglobin: 12.4 g/dL, Hematocrit: 38.9 %,  platelet: 280 K/uL  (02-14) Na: 139 mmol/L ; K: 3.7 mmol/L ; BUN: 50 mg/dL ; Cr: 2.50 mg/dL.    RADIOLOGY:  ACC: [id number] EXAM:  US DPLX LWR EXT VEINS COMPL BI   ORDERED BY: TALI SILVA     PROCEDURE DATE:  02/14/2023          INTERPRETATION:  CLINICAL INFORMATION: Cool/discolored extremities.    COMPARISON: None available.    TECHNIQUE: Duplex sonography of the BILATERAL LOWER extremity veins with   color and spectral Doppler, with and without compression.    FINDINGS:    RIGHT:  Normal compressibility of the RIGHT common femoral, femoral and popliteal   veins.  Thrombus is noted within the right posterior tibial vein, peroneal vein   as well as gastrocnemius vein, with noncompressibility evident consistent   with DVT.    LEFT:  Normal compressibility of the LEFT common femoral, femoral and popliteal   veins.  Patency of the left posterior tibial vein is identified.  Thrombus is noted within the left peroneal vein and left soleal vein,   with noncompressibility evident consistent with DVT.    IMPRESSION:  Bilateral lower extremity DVT identified within the bilateral calf   regions, as detailed above.      ACC: [id number] EXAM:  CT ABDOMEN AND PELVIS OC   ORDERED BY: LUKASZ GOSS     ACC: [id number] EXAM:  CT CHEST   ORDERED BY: LUKASZ GOSS   PROCEDURE DATE:  02/12/2023    INTERPRETATION:  CLINICAL INFORMATION: Sepsis. Abdominal pain.  COMPARISON: 4/30/2008    CONTRAST/COMPLICATIONS:  IV Contrast: NONE  Oral Contrast: Gastroview  Complications: None reported at time of study completion    PROCEDURE:  CT of the Chest, Abdomen and Pelvis was performed.  Sagittal and coronal reformatswere performed.    FINDINGS:  CHEST:  LUNGS AND LARGE AIRWAYS: Patent central airways. Basilar airspace   opacity, left greater than right consistent with atelectasis versus   pneumonia.  PLEURA: No pleural effusion.  VESSELS: Within normal limits.  HEART: Cardiomegaly. No pericardial effusion.  MEDIASTINUM AND GEORGIANA: Moderate hiatal hernia. Mediastinal adenopathy. For   reference:  *  Subcarinal lymph node measures 1.5 x 1.2 cm (2:31)  *  Pretracheal lymph node measures 1.4 x 1.2 cm (2:20)  CHESTWALL AND LOWER NECK: Within normal limits.    ABDOMEN AND PELVIS:  LIVER: Innumerable low-attenuation lesions in the liver largest measuring   approximately 3 cm, concerning for metastatic disease.  BILE DUCTS: Normal caliber.  GALLBLADDER: Layering biliary sludge  SPLEEN: Within normal limits.  PANCREAS: Mass in the head of the pancreas measuring 5.2 x 3.6 cm with   distal pancreatic ductal dilatation and atrophy, concerning for   pancreatic adenocarcinoma. Running of the peripancreatic fat.   Superimposed acute pancreatitis is a possibility.  ADRENALS: Low-attenuation thickening of the left adrenal gland likely on   the basis of underlying adrenal adenoma.  KIDNEYS/URETERS: Nonobstructing lower pole renal stone measuring 10 mm.   Punctate nonobstructing left renal stones. Left upper pole renal lesion   measures 1.8 cm, indeterminate.    BLADDER: Within normal limits.  REPRODUCTIVE ORGANS: Uterus and bilateral adnexa within normal limits.    BOWEL: No bowel obstruction. Appendix is normal.  PERITONEUM: Fluid collection along the greater curvature of the stomach   extending along the omentum measuring 6.1 x 5.0 x 8.1 cm. Nodularity   along the omentum and in the left upper abdomen. Peritoneal metastasis is   a possibility. Small amount of free fluid in the pelvis.  VESSELS: Within normal limits.  RETROPERITONEUM/LYMPH NODES: Periportal adenopathy.  ABDOMINAL WALL: Subcutaneous edema.  BONES: Osteopenia. Compression deformity of the L1 vertebral body.   Degenerative change.    IMPRESSION:  *  Findings concerning for pancreatic adenocarcinoma with metastases to   the liver.  *  Peripancreatic fat stranding and fluid collection along the greater   curvature of the stomach extending into the omentum, raising a question   of superimposed acute pancreatitis.  *  Basilar consolidation consistent with atelectasis/pneumonia.  *  Mediastinal adenopathy.

## 2023-02-16 NOTE — PROGRESS NOTE ADULT - PROBLEM SELECTOR PLAN 1
Advanced pancreatic malignancy with apparent hepatic metastases.  Malignancy induced hypercoagulability, clinically evident with bilateral LE DVTs.  Will require indefinite anticoagulation.  Not a candidate for systemic chemotherapy.  Hospice referral made for hospice inn, per family's wishes.  Continue comfort care.

## 2023-02-16 NOTE — DISCHARGE NOTE FOR THE EXPIRED PATIENT - HOSPITAL COURSE
Hospital Course  HPI:  88F with HTN, mild dementia, HLD, comes to hospital for weakness. She has "not been herself" the last few days - went to PMD office today - noted to be "listless" - and was sent to ED. Patient was given IVF in the ED. Has much improved since. Per patient, her only complaint is a "cough" which has been going on for some time. No chest pain. No fevers. No sick contacts. No burning with urination. No N/V/D. Took patient off O2 - satting 100% currently on RA. Patient unreliable historian due to mild dementia.  (10 Feb 2023 16:19)    Patient admitted to medicine.  She was noted to have ANGELIA and given IVF  She was noted to have a leukocytosis, UCX are still pending  WBC cell count has worsened, although there has been no fever noted. CT Imaging shows findings concerning for pancreatic adenocarcinoma with metastases to the liver. Peripancreatic fat stranding and fluid collection along the greater curvature of the stomach extending into the omentum, raising a question   of superimposed acute pancreatitis. Basilar consolidation consistent with atelectasis/pneumonia. Mediastinal adenopathy.  MOLST was filled out with HCP son, patient is DNR/DNI. Family opted for Hospice inn.  Nurse informs the teams that the patient has no pulse and she is not able to record a Blood Pressure.  Patient was seen and examined at bedside. Family is at the bedside  There is no visible respiratory movements, no palpable pulse, no spontaneous movements.  Time of death 1pm  Informed the family at bedside, no autopsy requested  Admitting informed.  Final cause of death: B/L DVT and metastatic pancreatic adenocarcinoma with reid

## 2023-02-16 NOTE — DISCHARGE NOTE NURSING/CASE MANAGEMENT/SOCIAL WORK - NSDCPEFALRISK_GEN_ALL_CORE
For information on Fall & Injury Prevention, visit: [email or web address] OR  [email or web address] OR  https://www.cdc.gov/steadi/patient.html

## 2023-02-16 NOTE — CHART NOTE - NSCHARTNOTEFT_GEN_A_CORE
Called to bedside by nurse. Patient found no longer breathing, no chest rise and with no pulse. Suspected PE. Pronounced  at 1300. Family at bedside including HCP Nino notified and updated.

## 2023-02-18 LAB
CULTURE RESULTS: SIGNIFICANT CHANGE UP
CULTURE RESULTS: SIGNIFICANT CHANGE UP
SPECIMEN SOURCE: SIGNIFICANT CHANGE UP
SPECIMEN SOURCE: SIGNIFICANT CHANGE UP

## 2023-02-24 ENCOUNTER — APPOINTMENT (OUTPATIENT)
Dept: NEUROLOGY | Facility: CLINIC | Age: 88
End: 2023-02-24

## 2023-03-09 NOTE — CONSULT NOTE ADULT - CONSULT REQUESTED DATE/TIME
20-Apr-2022 11:00 Island Pedicle Flap With Canthal Suspension Text: The defect edges were debeveled with a #15 scalpel blade.  Given the location of the defect, shape of the defect and the proximity to free margins an island pedicle advancement flap was deemed most appropriate.  Using a sterile surgical marker, an appropriate advancement flap was drawn incorporating the defect, outlining the appropriate donor tissue and placing the expected incisions within the relaxed skin tension lines where possible. The area thus outlined was incised deep to adipose tissue with a #15 scalpel blade.  The skin margins were undermined to an appropriate distance in all directions around the primary defect and laterally outward around the island pedicle utilizing iris scissors.  There was minimal undermining beneath the pedicle flap. A suspension suture was placed in the canthal tendon to prevent tension and prevent ectropion.

## 2023-06-07 NOTE — CURRENT MEDS
[Takes medication as prescribed] : takes [None] : Patient does not have any barriers to medication adherence Blu Villalta

## 2023-06-26 NOTE — DISCHARGE NOTE PROVIDER - CARE PROVIDERS DIRECT ADDRESSES
Advised patient of below via e-advice.    UA previously placed, pending results/provider review. Will update patient once PCP advises/reviews.    [email or web address]

## 2024-05-01 NOTE — ED ADULT NURSE NOTE - NS PRO PASSIVE SMOKE EXP
Nurses Note -- 4 Eyes      4/30/2024   9:47 PM      Skin assessed during: Q Shift Change      [x] No Altered Skin Integrity Present    []Prevention Measures Documented      [] Yes- Altered Skin Integrity Present or Discovered   [] LDA Added if Not in Epic (Describe Wound)   [] New Altered Skin Integrity was Present on Admit and Documented in LDA   [] Wound Image Taken    Wound Care Consulted? No    Attending Nurse:  Jovany Michaels RN/Staff Member:  Jud            No

## 2024-09-24 NOTE — ED ADULT NURSE NOTE - SUICIDE SCREENING QUESTION 3
Spoke with patient and advised that writer would send link from [employer] site via [employer] on how to pair and unpair devices.  Patient verbalized understanding.   No

## 2024-12-18 NOTE — H&P ADULT - NSICDXFAMILYHX_GEN_ALL_CORE_FT
AMY SEVILLA [facility]  [address] [place] [address] - Ph: [phone]   Fx: [phone]  PHYSICAL THERAPY PROGRESS NOTE  [x] Progress Note  [] Discharge Summary    Patient Name: Amy Bernardo : 1940   Treatment/Medical Diagnosis: Other low back pain [M54.59]   Referral Source: Bhavik Crain MD     Date of Initial Visit: 24 Attended Visits: 29 Missed Visits: 0       Prior Level of Function: Independent Community Ambulator. Lives Alone. Does Gardening Activities and likes to Cook.    Comorbidities:  Other:   Osteoporosis, Cancer, Right Sh Arthroplasty       SUMMARY OF TREATMENT  Pt. Is progressing with physical therapy but continues to be limited by her back pain. Oswestry score did improve to 14%. Hip flexion MMT also improved to 4-5 bilaterally. Left hip abduction continues to be limited at 4-/5. She also has decreased balance with eyes closed at 4 seconds. She has decreased ankle strategy during balance activities and delayed step response. She also continues to report difficulty with floor to stand transfers. Skilled PT is medically necessary in order to improve back pain and balance for increased safety at home.     CURRENT STATUS/Progress towards Goals:  Goal: Patient will improve The Revised Oswestry Disability Index for Low Back Pain/Dysfunction by 13% in order to demonstrate a significant improvement in pain for increased ease of daily activities.    Status at evaluation/last progress note: 20%  Not met    2.   Goal: Patient will perform Romberg EC balance on compliant surface without LOB in order to demonstrate improving balance for improve safety at home.   Status at evaluation/last progress note: 12 seconds  Not met    3.   Goal: Patient will improve B hip flexion MMT to 4/5 in order to increase ease of curb negotiation.   Status at evaluation/last progress note: B: 3+/5  Not met    4.   Goal: Patient will  FAMILY HISTORY:  Father  Still living? No  FH: stroke, Age at diagnosis: Age Unknown

## 2025-02-26 NOTE — ED ADULT NURSE NOTE - NSFALLRSKHRMRISKTYPE_ED_ALL_ED
Well Adult Note  Name: Mylene Persaud Today’s Date: 2025   MRN: [id number] Sex: Female   Age: 50 y.o. Ethnicity: Non- / Non    : 1974 Race: White (non-)      Mylene Persaud is here for a well adult exam.       Assessment & Plan   Well adult exam  -     POCT Urinalysis no Micro  -     CBC with Auto Differential; Future  -     Comprehensive Metabolic Panel; Future  -     Lipid Panel; Future  -     TSH; Future  Other fatigue  -     CBC with Auto Differential; Future  -     TSH; Future  Menopause she will speak to her gynecologist about switching her possibly to Prempro other options would be Effexor or Pristiq there is also a newer medicine called Veozah        Return in about 1 year (around 2026).       Subjective   History:  50-year-old comes in for her annual adult wellness exam currently on birth control for hot flashes and vasomotor symptoms.  She has seen her gynecologist I think today.  Mammogram last year was normal she will be getting another 1 this year.  Colonoscopy in  was normal should be due again in .  Other than the vasomotor symptoms she has no complaints.  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  Otherwise she has no complaints    Review of Systems   All other systems reviewed and are negative.      No Known Allergies  Prior to Visit Medications    Medication Sig Taking? Authorizing Provider   Levonorgest-Eth Estrad 91-Day 0.1-0.02 & 0.01 MG TABS Take 1 tablet by mouth daily Yes Provider, MD Heriberto     No past medical history on file.  No past surgical history on file.  No family history on file.  Social History     Tobacco Use    Smoking status: Never     Passive exposure: Never    Smokeless tobacco: Never   Vaping Use    Vaping status: Never Used   Substance Use Topics    Alcohol use: Never    Drug use: Never           Objective   age(85 years old or older)

## 2025-04-07 NOTE — DISCHARGE NOTE NURSING/CASE MANAGEMENT/SOCIAL WORK - NSFLUVACAGEDISCH_IMM_ALL_CORE
Detail Level: Detailed
Add 61275 Cpt? (Important Note: In 2017 The Use Of 91478 Is Being Tracked By Cms To Determine Future Global Period Reimbursement For Global Periods): yes
Adult

## 2025-04-23 NOTE — PATIENT PROFILE ADULT - LEGAL HELP
"        VS: BP 91/61 (BP Location: Left arm)   Pulse 90   Temp 97.9  F (36.6  C) (Oral)   Resp 16   Ht 1.676 m (5' 6\")   Wt 51.4 kg (113 lb 4.8 oz)   SpO2 96%   BMI 18.29 kg/m       O2: 98% on R/A   Output: Hay catheter patent, incontinent of bowel   Last BM: 4/26/23   Activity: Liko lift   Up for meals? bedside   Skin: Surgical staples   Pain: denies   CMS: Alert and oriented   Dressing: abd and tape   Diet: Regular whole thin   LDA: SANDEEP drain, Hay catheter, PIV   Equipment: Liko lift   Plan: Cont. Poc    Additional Info: Patient remains alert and oriented, call light appropriate, Denies chest pain, SOB, N/V. Surgical  dressing intact, done by surgeon, staples intact. SANDEEP drain patent, output   10cc. VSS             " no